# Patient Record
Sex: MALE | Race: WHITE | NOT HISPANIC OR LATINO | Employment: OTHER | URBAN - METROPOLITAN AREA
[De-identification: names, ages, dates, MRNs, and addresses within clinical notes are randomized per-mention and may not be internally consistent; named-entity substitution may affect disease eponyms.]

---

## 2023-05-22 PROBLEM — K27.9 PUD (PEPTIC ULCER DISEASE): Status: ACTIVE | Noted: 2023-05-22

## 2023-06-05 PROBLEM — I25.10 CORONARY ARTERY ARTERIOSCLEROSIS: Status: ACTIVE | Noted: 2023-06-05

## 2023-08-10 DIAGNOSIS — E11.9 TYPE 2 DIABETES MELLITUS WITHOUT COMPLICATION, UNSPECIFIED WHETHER LONG TERM INSULIN USE (MULTI): ICD-10-CM

## 2023-08-10 DIAGNOSIS — I25.10 CORONARY ARTERY ARTERIOSCLEROSIS: ICD-10-CM

## 2023-08-10 RX ORDER — LANCETS 33 GAUGE
EACH MISCELLANEOUS
Qty: 200 EACH | Refills: 3 | Status: SHIPPED | OUTPATIENT
Start: 2023-08-10 | End: 2023-08-11 | Stop reason: SDUPTHER

## 2023-08-11 RX ORDER — BLOOD-GLUCOSE CONTROL, NORMAL
EACH MISCELLANEOUS
Qty: 200 EACH | Refills: 3 | Status: SHIPPED | OUTPATIENT
Start: 2023-08-11

## 2023-08-21 DIAGNOSIS — K27.9 PUD (PEPTIC ULCER DISEASE): ICD-10-CM

## 2023-08-21 RX ORDER — PANTOPRAZOLE SODIUM 40 MG/1
40 TABLET, DELAYED RELEASE ORAL DAILY
Qty: 90 TABLET | Refills: 1 | Status: SHIPPED | OUTPATIENT
Start: 2023-08-21 | End: 2024-02-13

## 2023-08-25 ENCOUNTER — HOSPITAL ENCOUNTER (OUTPATIENT)
Dept: DATA CONVERSION | Facility: HOSPITAL | Age: 88
End: 2023-08-25
Attending: OPHTHALMOLOGY | Admitting: OPHTHALMOLOGY
Payer: MEDICARE

## 2023-08-25 DIAGNOSIS — H02.105 UNSPECIFIED ECTROPION OF LEFT LOWER EYELID: ICD-10-CM

## 2023-08-25 DIAGNOSIS — H02.102 UNSPECIFIED ECTROPION OF RIGHT LOWER EYELID: ICD-10-CM

## 2023-08-25 LAB — POCT GLUCOSE: 91 MG/DL (ref 74–99)

## 2023-09-05 PROBLEM — N40.0 BPH (BENIGN PROSTATIC HYPERPLASIA): Status: ACTIVE | Noted: 2023-09-05

## 2023-09-05 PROBLEM — E03.9 HYPOTHYROID: Status: ACTIVE | Noted: 2023-09-05

## 2023-09-05 PROBLEM — H52.4 BILATERAL PRESBYOPIA: Status: ACTIVE | Noted: 2023-09-05

## 2023-09-05 PROBLEM — I50.9 CONGESTIVE HEART FAILURE (MULTI): Status: ACTIVE | Noted: 2023-09-05

## 2023-09-05 PROBLEM — K92.2 GASTROINTESTINAL BLEED: Status: ACTIVE | Noted: 2023-09-05

## 2023-09-05 PROBLEM — E78.5 DYSLIPIDEMIA: Status: ACTIVE | Noted: 2023-09-05

## 2023-09-05 PROBLEM — E11.9 DIABETES MELLITUS (MULTI): Status: ACTIVE | Noted: 2023-09-05

## 2023-09-05 PROBLEM — H02.132 SENILE ECTROPION OF BOTH LOWER EYELIDS: Status: ACTIVE | Noted: 2023-09-05

## 2023-09-05 PROBLEM — I95.9 HYPOTENSION: Status: ACTIVE | Noted: 2023-09-05

## 2023-09-05 PROBLEM — I10 BENIGN ESSENTIAL HYPERTENSION: Status: ACTIVE | Noted: 2023-09-05

## 2023-09-05 PROBLEM — H02.135 SENILE ECTROPION OF BOTH LOWER EYELIDS: Status: ACTIVE | Noted: 2023-09-05

## 2023-09-05 PROBLEM — E11.40 DIABETIC NEUROPATHY (MULTI): Status: ACTIVE | Noted: 2023-09-05

## 2023-09-05 PROBLEM — M54.50 LOWER BACK PAIN: Status: ACTIVE | Noted: 2023-09-05

## 2023-09-05 PROBLEM — C83.50: Status: ACTIVE | Noted: 2023-09-05

## 2023-09-05 PROBLEM — E55.9 VITAMIN D DEFICIENCY: Status: ACTIVE | Noted: 2023-09-05

## 2023-09-05 PROBLEM — I10 HYPERTENSION: Status: ACTIVE | Noted: 2023-09-05

## 2023-09-05 PROBLEM — E11.9 DIABETES MELLITUS TYPE 2 WITHOUT RETINOPATHY (MULTI): Status: ACTIVE | Noted: 2023-09-05

## 2023-09-05 PROBLEM — N18.31 STAGE 3A CHRONIC KIDNEY DISEASE (MULTI): Status: ACTIVE | Noted: 2023-09-05

## 2023-09-05 PROBLEM — H04.203 BILATERAL EPIPHORA: Status: ACTIVE | Noted: 2023-09-05

## 2023-09-05 PROBLEM — H52.03 HYPEROPIA OF BOTH EYES: Status: ACTIVE | Noted: 2023-09-05

## 2023-09-05 PROBLEM — H02.109 PUNCTAL ECTROPION: Status: ACTIVE | Noted: 2023-09-05

## 2023-09-30 NOTE — H&P
History of Present Illness:   History Present Illness:  Reason for surgery: bilateral lower lid ectropion,  bilateral punctal ectropion   HPI:    Shady Becerril is an 88 y/o M with a history of bilateral lower lid ectropion, bilateral punctal ectropion, who is here for bilateral lower lid ectropion  repair and bilateral lower lid conjunctivoplasty.    Allergies:        Allergies:  ·  No Known Allergies :     Home Medication Review:   Home Medications Reviewed: yes     Impression/Procedure:   ·  Impression and Planned Procedure: bilateral lower lid ectropion, bilateral punctal ectropion; here for bilateral lower lid ectropion repair and bilateral lower lid conjunctivoplasty       ERAS (Enhanced Recovery After Surgery):  ·  ERAS Patient: no       Physical Exam by System:    Constitutional: Well developed, awake/alert/oriented  x3, no distress, alert and cooperative   Eyes: bilateral lower lid ectropion, bilateral punctal  ectropion   Respiratory/Thorax: Non labored respirations   Cardiovascular: Warm, well perfused   Psychological: Appropriate mood and behavior     Consent:   COVID-19 Consent:  ·  COVID-19 Risk Consent Surgeon has reviewed key risks related to the risk of marcin COVID-19 and if they contract COVID-19 what the risks are.     Attestation:   Note Completion:  I am a:  Resident/Fellow   Attending Attestation I saw and evaluated the patient.  I personally obtained the key and critical portions of the history and physical exam or was physically present for key and  critical portions performed by the resident/fellow. I reviewed the resident/fellow?s documentation and discussed the patient with the resident/fellow.  I agree with the resident/fellow?s medical decision making as documented in the note.     I personally evaluated the patient on 25-Aug-2023         Electronic Signatures:  Marcleo Cedillo (Resident))  (Signed 25-Aug-2023 09:40)   Authored: History of Present Illness,  Allergies, Home  Medication Review, Impression/Procedure, ERAS, Physical Exam, Consent, Note Completion  Isaiah Grayson)  (Signed 25-Aug-2023 15:40)   Authored: Note Completion   Co-Signer: History of Present Illness, Allergies, Home Medication Review, Impression/Procedure, ERAS, Physical Exam, Consent, Note Completion      Last Updated: 25-Aug-2023 15:40 by Isaiah Grayson)

## 2023-10-01 NOTE — OP NOTE
Post Operative Note:     PreOp Diagnosis: bilateral lower eyelid ectropion,  bilateral lower eyelid punctal ectropion   Post-Procedure Diagnosis: bilateral lower eyelid  ectropion, bilateral lower eyelid punctal ectropion   Procedure: 1. bilateral lower eyelid ectropion repair  with full thickness excision and repair  2. bilateral lower lid conjunctivoplasty   Surgeon: Isaiah Grayson   Resident/Fellow/Other Assistant: Marcelo Darden   Anesthesia: MAC   Estimated Blood Loss (mL): <2 cc   Specimen: no   Complications: None   Findings: bilateral lower lid ectropion, bilateral  lower lid punctal ectropion     Operative Report Dictated:  Dictation: not applicable - note contains Operative  Report   Operative Report:    The patient was taken to the operating room and placed on the table in the supine position, where anesthesia was induced. 2% lidocaine  with epinephrine and 0.5% marcaine in a 1:1 fashion was injected over the surgical site, and the patient was prepped and draped in the usual manner for orbitofacial surgery.     Corneal protectors were placed in both eyes.     A 15 Bard-Bro blade incision was made at the right  lateral canthus. Sharp dissection was carried down to the lateral orbital rim periosteum. The inferior ramus of the lateral canthal tendon was identified and severed with sharp dissection. A full-thickness en bloc excision of the lateral aspect of the  tarsal plate was carried out with sharp dissection, and bleeding was controlled with electrocauterization. The exact same procedure was performed on the contralateral left  lower lid.     Our attention was then drawn to the medial conjunctivoplasty procedures. A 10mm  long by 4mm ellipse was incised on the right palpebral conjunctiva medially, and Jay scissors were used to excise the conjunctiva  and retractor layer. The lower lid retractors and conjunctiva were then advanced onto the inferior border of tarsus with 5-0  chromic gut suture and the suture was then spiraled inferiorly through  the fornix and exited on the skin near the nasojugal fold. The exact same procedure was performed on the contralateral left  lower lid.     We then returned to the full thickness excision and repair. The cut edge of the right  tarsal plate was advanced to the lateral orbital rim periosteum, where it was sutured with 5-0 vicryl suture to the internal aspect of the lateral orbital rim periosteum. The skin   was closed with 5-0 fast absorbing suture. The exact same procedure was performed on the contralateral  left lower lid.     The corneal protectors were removed and antibiotic ophthalmic ointment was placed over the surgical site.      The patient was then awakened and taken from the operating room in good condition, having tolerated the procedure well. There were no complications,  and the estimated blood loss was less than 2 cc.      Attestation:   Note Completion:  I am a: Resident/Fellow   Attending Attestation I was present for the entire procedure          Electronic Signatures:  Marcelo Cedillo (Resident))  (Signed 25-Aug-2023 15:28)   Authored: Post Operative Note, Note Completion  Isaiah Grayson)  (Signed 25-Aug-2023 15:48)   Authored: Post Operative Note, Note Completion   Co-Signer: Post Operative Note, Note Completion      Last Updated: 25-Aug-2023 15:48 by Isaiah Grayson)

## 2023-10-31 ENCOUNTER — HOSPITAL ENCOUNTER (OUTPATIENT)
Dept: RADIOLOGY | Facility: HOSPITAL | Age: 88
Discharge: HOME | End: 2023-10-31
Payer: MEDICARE

## 2023-10-31 DIAGNOSIS — I27.29 OTHER SECONDARY PULMONARY HYPERTENSION (MULTI): ICD-10-CM

## 2023-10-31 PROCEDURE — 71046 X-RAY EXAM CHEST 2 VIEWS: CPT | Mod: FY

## 2023-10-31 PROCEDURE — 71046 X-RAY EXAM CHEST 2 VIEWS: CPT | Performed by: RADIOLOGY

## 2023-10-31 PROCEDURE — A9540 TC99M MAA: HCPCS

## 2023-10-31 PROCEDURE — 3430000001 HC RX 343 DIAGNOSTIC RADIOPHARMACEUTICALS

## 2023-10-31 PROCEDURE — 78803 RP LOCLZJ TUM SPECT 1 AREA: CPT | Performed by: STUDENT IN AN ORGANIZED HEALTH CARE EDUCATION/TRAINING PROGRAM

## 2023-10-31 PROCEDURE — 78580 LUNG PERFUSION IMAGING: CPT

## 2023-11-15 PROBLEM — Z95.828 PORT-A-CATH IN PLACE: Status: ACTIVE | Noted: 2021-10-27

## 2023-11-15 PROBLEM — C90.00 MULTIPLE MYELOMA (MULTI): Status: ACTIVE | Noted: 2020-04-24

## 2023-11-15 PROBLEM — I48.92 ATRIAL FLUTTER (MULTI): Status: ACTIVE | Noted: 2022-10-17

## 2023-11-15 PROBLEM — D47.2: Status: ACTIVE | Noted: 2020-03-03

## 2023-11-15 PROBLEM — D69.6 PLATELETS DECREASED (CMS-HCC): Status: ACTIVE | Noted: 2023-05-18

## 2023-11-15 PROBLEM — C83.00 MALIGNANT LYMPHOPLASMACYTIC LYMPHOMA (MULTI): Status: ACTIVE | Noted: 2023-11-15

## 2023-11-15 PROBLEM — R01.1 HEART MURMUR: Status: ACTIVE | Noted: 2020-03-23

## 2023-11-15 PROBLEM — E11.9 DIABETES MELLITUS TYPE 2, CONTROLLED, WITHOUT COMPLICATIONS (MULTI): Chronic | Status: ACTIVE | Noted: 2020-03-23

## 2023-11-15 PROBLEM — Z98.890 HISTORY OF CARDIAC CATH: Status: ACTIVE | Noted: 2020-03-23

## 2023-11-15 PROBLEM — C85.99: Status: ACTIVE | Noted: 2021-08-05

## 2023-11-15 PROBLEM — N18.30 CKD (CHRONIC KIDNEY DISEASE) STAGE 3, GFR 30-59 ML/MIN (MULTI): Status: ACTIVE | Noted: 2020-04-24

## 2023-11-15 PROBLEM — I50.33 ACUTE ON CHRONIC DIASTOLIC CONGESTIVE HEART FAILURE (MULTI): Status: ACTIVE | Noted: 2020-03-23

## 2023-11-15 PROBLEM — Z95.1 HX OF CABG: Status: ACTIVE | Noted: 2020-03-23

## 2023-11-15 RX ORDER — FAMOTIDINE 20 MG/1
1 TABLET, FILM COATED ORAL 2 TIMES DAILY
COMMUNITY
Start: 2020-06-22

## 2023-11-15 RX ORDER — LENALIDOMIDE 2.5 MG/1
CAPSULE ORAL
COMMUNITY
Start: 2021-02-03

## 2023-11-15 RX ORDER — ATORVASTATIN CALCIUM 20 MG/1
20 TABLET, FILM COATED ORAL NIGHTLY
COMMUNITY

## 2023-11-15 RX ORDER — ASPIRIN 81 MG/1
1 TABLET ORAL DAILY
COMMUNITY
Start: 2011-02-15

## 2023-11-15 RX ORDER — INSULIN ASPART 100 [IU]/ML
INJECTION, SUSPENSION SUBCUTANEOUS
Status: ON HOLD | COMMUNITY
Start: 2015-01-08 | End: 2024-05-07 | Stop reason: WASHOUT

## 2023-11-15 RX ORDER — FERROUS SULFATE 325(65) MG
325 TABLET ORAL
COMMUNITY
Start: 2020-06-22

## 2023-11-15 RX ORDER — POTASSIUM CHLORIDE 750 MG/1
1 TABLET, FILM COATED, EXTENDED RELEASE ORAL 2 TIMES DAILY
COMMUNITY
Start: 2020-09-18

## 2023-11-15 RX ORDER — NITROGLYCERIN 0.4 MG/1
0.4 TABLET SUBLINGUAL
COMMUNITY
Start: 2020-03-03

## 2023-11-15 RX ORDER — BLOOD-GLUCOSE METER
EACH MISCELLANEOUS
COMMUNITY
Start: 2020-08-31

## 2023-11-15 RX ORDER — ACYCLOVIR 400 MG/1
400 TABLET ORAL DAILY
COMMUNITY

## 2023-11-21 ENCOUNTER — LAB (OUTPATIENT)
Dept: LAB | Facility: LAB | Age: 88
End: 2023-11-21
Payer: MEDICARE

## 2023-11-21 ENCOUNTER — OFFICE VISIT (OUTPATIENT)
Dept: PRIMARY CARE | Facility: CLINIC | Age: 88
End: 2023-11-21
Payer: MEDICARE

## 2023-11-21 VITALS
TEMPERATURE: 97.2 F | HEART RATE: 55 BPM | WEIGHT: 150.69 LBS | DIASTOLIC BLOOD PRESSURE: 62 MMHG | OXYGEN SATURATION: 100 % | SYSTOLIC BLOOD PRESSURE: 112 MMHG | HEIGHT: 63 IN | BODY MASS INDEX: 26.7 KG/M2

## 2023-11-21 DIAGNOSIS — C83.00 LYMPHOPLASMACYTIC LYMPHOMA (MULTI): ICD-10-CM

## 2023-11-21 DIAGNOSIS — E78.00 HYPERCHOLESTEREMIA: ICD-10-CM

## 2023-11-21 DIAGNOSIS — I48.0 PAF (PAROXYSMAL ATRIAL FIBRILLATION) (MULTI): ICD-10-CM

## 2023-11-21 DIAGNOSIS — J90 PLEURAL EFFUSION: Primary | ICD-10-CM

## 2023-11-21 DIAGNOSIS — Z13.89 SCREENING FOR MULTIPLE CONDITIONS: ICD-10-CM

## 2023-11-21 DIAGNOSIS — J42 CHRONIC BRONCHITIS, UNSPECIFIED CHRONIC BRONCHITIS TYPE (MULTI): ICD-10-CM

## 2023-11-21 DIAGNOSIS — Z00.00 ROUTINE GENERAL MEDICAL EXAMINATION AT HEALTH CARE FACILITY: ICD-10-CM

## 2023-11-21 DIAGNOSIS — E11.69 TYPE 2 DIABETES MELLITUS WITH OTHER SPECIFIED COMPLICATION, WITHOUT LONG-TERM CURRENT USE OF INSULIN (MULTI): ICD-10-CM

## 2023-11-21 DIAGNOSIS — I50.9 CONGESTIVE HEART FAILURE, UNSPECIFIED HF CHRONICITY, UNSPECIFIED HEART FAILURE TYPE (MULTI): ICD-10-CM

## 2023-11-21 DIAGNOSIS — J90 PLEURAL EFFUSION: ICD-10-CM

## 2023-11-21 PROBLEM — C88.00 WALDENSTROM MACROGLOBULINEMIA: Status: RESOLVED | Noted: 2020-03-03 | Resolved: 2023-11-21

## 2023-11-21 PROBLEM — H52.4 BILATERAL PRESBYOPIA: Status: RESOLVED | Noted: 2023-09-05 | Resolved: 2023-11-21

## 2023-11-21 PROBLEM — C88.0 WALDENSTROM MACROGLOBULINEMIA (MULTI): Status: ACTIVE | Noted: 2020-03-03

## 2023-11-21 PROBLEM — C88.00 WALDENSTROM MACROGLOBULINEMIA: Status: ACTIVE | Noted: 2020-03-03

## 2023-11-21 PROBLEM — C88.0 WALDENSTROM MACROGLOBULINEMIA (MULTI): Status: RESOLVED | Noted: 2020-03-03 | Resolved: 2023-11-21

## 2023-11-21 LAB
BNP SERPL-MCNC: 416 PG/ML (ref 0–99)
CHOLEST SERPL-MCNC: 62 MG/DL (ref 0–199)
CHOLESTEROL/HDL RATIO: 2
HDLC SERPL-MCNC: 31.1 MG/DL
LDLC SERPL CALC-MCNC: 20 MG/DL
NON HDL CHOLESTEROL: 31 MG/DL (ref 0–149)
TRIGL SERPL-MCNC: 54 MG/DL (ref 0–149)
VLDL: 11 MG/DL (ref 0–40)

## 2023-11-21 PROCEDURE — 83036 HEMOGLOBIN GLYCOSYLATED A1C: CPT

## 2023-11-21 PROCEDURE — 1160F RVW MEDS BY RX/DR IN RCRD: CPT | Performed by: INTERNAL MEDICINE

## 2023-11-21 PROCEDURE — 36415 COLL VENOUS BLD VENIPUNCTURE: CPT

## 2023-11-21 PROCEDURE — G0439 PPPS, SUBSEQ VISIT: HCPCS | Performed by: INTERNAL MEDICINE

## 2023-11-21 PROCEDURE — 3078F DIAST BP <80 MM HG: CPT | Performed by: INTERNAL MEDICINE

## 2023-11-21 PROCEDURE — 1170F FXNL STATUS ASSESSED: CPT | Performed by: INTERNAL MEDICINE

## 2023-11-21 PROCEDURE — 3074F SYST BP LT 130 MM HG: CPT | Performed by: INTERNAL MEDICINE

## 2023-11-21 PROCEDURE — 1036F TOBACCO NON-USER: CPT | Performed by: INTERNAL MEDICINE

## 2023-11-21 PROCEDURE — 1159F MED LIST DOCD IN RCRD: CPT | Performed by: INTERNAL MEDICINE

## 2023-11-21 RX ORDER — AMLODIPINE BESYLATE 5 MG/1
5 TABLET ORAL DAILY
COMMUNITY

## 2023-11-21 RX ORDER — MUPIROCIN 20 MG/G
OINTMENT TOPICAL
COMMUNITY

## 2023-11-21 ASSESSMENT — ENCOUNTER SYMPTOMS
SORE THROAT: 0
PALPITATIONS: 0
HEADACHES: 0
BRUISES/BLEEDS EASILY: 0
UNEXPECTED WEIGHT CHANGE: 1
FATIGUE: 1
COUGH: 0
FEVER: 0
DIZZINESS: 0
DEPRESSION: 0
DIARRHEA: 0
WHEEZING: 0
SINUS PAIN: 0
OCCASIONAL FEELINGS OF UNSTEADINESS: 0
ABDOMINAL PAIN: 0
ARTHRALGIAS: 0
BLOOD IN STOOL: 0
LOSS OF SENSATION IN FEET: 0
DIFFICULTY URINATING: 0
SHORTNESS OF BREATH: 1

## 2023-11-21 ASSESSMENT — ACTIVITIES OF DAILY LIVING (ADL)
GROCERY_SHOPPING: INDEPENDENT
BATHING: INDEPENDENT
TAKING_MEDICATION: INDEPENDENT
DOING_HOUSEWORK: INDEPENDENT
MANAGING_FINANCES: INDEPENDENT
DRESSING: INDEPENDENT

## 2023-11-21 ASSESSMENT — PATIENT HEALTH QUESTIONNAIRE - PHQ9
1. LITTLE INTEREST OR PLEASURE IN DOING THINGS: NOT AT ALL
2. FEELING DOWN, DEPRESSED OR HOPELESS: NOT AT ALL
SUM OF ALL RESPONSES TO PHQ9 QUESTIONS 1 AND 2: 0

## 2023-11-21 NOTE — PROGRESS NOTES
Subjective   Reason for Visit: Shady Becerril is an 89 y.o. male here for a Medicare Wellness visit.     Past Medical, Surgical, and Family History reviewed and updated in chart.    Reviewed all medications by prescribing practitioner or clinical pharmacist (such as prescriptions, OTCs, herbal therapies and supplements) and documented in the medical record.    Annual preventive visit  - Vaccinations reviewed flu vaccine up-to-date patient declined RSV Tdap due to being immunocompromise we will follow-up closely as needed  -Screening for colon cancer obtained no need for further repeat  - Screening for depression negative  - Medical screening reviewed    Follow-up  -Coronary artery disease status post bypass surgery recently diagnosed with paroxysmal atrial fibrillation not candidate for anticoagulation due to previous GI bleed, not candidate for Watchman procedure patient opted for conservative measures we will continue monitoring closely  -Recent hematology oncology records reviewed cardiology records reviewed discussed with patient findings and plan of care  -Shortness of breath on exertion  Recent chest x-ray VQ scan reviewed left pleural effusion, stat BMP today 460 elevated patient counseled about resuming Demadex daily for 1 week then to take it at least twice a week follow-up results closely  -Asthenia weakness patient lipid profile controlled may take Crestor only twice a week at this point patient agreed  - Diabetes controlled follow-up hemoglobin A1c  -Lymphoplasmacytic lymphoma, 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  -Anemia:, Stable hematology may consider erythropoietin in the future if hemoglobin less than 10  -Renal sufficiency improved  - MGUS - follow SPEP and lambda/kappa free light chains over time  -  IV access:  Mediport draws.  Continue bimonthly mediport draws in [place] prior to each bimonthly visit with me.  - R ventricular HTN: Consider sleep apnea  "and arrange for sleep studies, if no improvement, need to continue  Demadex as recommended  Follow-up as needed follow-up in 6 months        Patient Care Team:  Juliette Zazueta MD as PCP - General     Review of Systems   Constitutional:  Positive for fatigue and unexpected weight change. Negative for fever.   HENT:  Negative for congestion, ear discharge, ear pain, mouth sores, sinus pain and sore throat.    Eyes:  Negative for visual disturbance.   Respiratory:  Positive for shortness of breath. Negative for cough and wheezing.    Cardiovascular:  Negative for chest pain, palpitations and leg swelling.   Gastrointestinal:  Negative for abdominal pain, blood in stool and diarrhea.   Genitourinary:  Negative for difficulty urinating.   Musculoskeletal:  Negative for arthralgias.   Skin:  Negative for rash.   Neurological:  Negative for dizziness and headaches.   Hematological:  Does not bruise/bleed easily.   Psychiatric/Behavioral:  Negative for behavioral problems.    All other systems reviewed and are negative.      Objective   Vitals:  /62   Pulse 55   Temp 36.2 °C (97.2 °F)   Ht 1.588 m (5' 2.5\")   Wt 68.4 kg (150 lb 11 oz)   SpO2 100%   BMI 27.12 kg/m²       Physical Exam  Vitals and nursing note reviewed.   Constitutional:       Appearance: Normal appearance.   HENT:      Head: Normocephalic.      Nose: Nose normal.   Eyes:      Conjunctiva/sclera: Conjunctivae normal.      Pupils: Pupils are equal, round, and reactive to light.   Cardiovascular:      Rate and Rhythm: Regular rhythm.   Pulmonary:      Effort: Pulmonary effort is normal.      Breath sounds: Normal breath sounds.   Abdominal:      General: Abdomen is flat.      Palpations: Abdomen is soft.   Musculoskeletal:      Cervical back: Neck supple.   Skin:     General: Skin is warm.   Neurological:      General: No focal deficit present.      Mental Status: He is oriented to person, place, and time.   Psychiatric:         Mood and Affect: " Mood normal.         Assessment/Plan   Problem List Items Addressed This Visit       Congestive heart failure (CMS/HCC)    Relevant Medications    nitroglycerin (Nitrostat) 0.4 mg SL tablet    amLODIPine (Norvasc) 5 mg tablet    Other Relevant Orders    B-type natriuretic peptide (Completed)    Diabetes mellitus (CMS/HCC)    Relevant Orders    Hemoglobin A1C    Lymphoplasmacytic lymphoma (CMS/HCC)    Chronic bronchitis, unspecified chronic bronchitis type (CMS/HCC)    Pleural effusion - Primary    Relevant Orders    B-type natriuretic peptide (Completed)    Hypercholesteremia    Relevant Orders    Lipid Panel (Completed)    PAF (paroxysmal atrial fibrillation) (CMS/HCC)    Relevant Medications    nitroglycerin (Nitrostat) 0.4 mg SL tablet    amLODIPine (Norvasc) 5 mg tablet    Other Relevant Orders    B-type natriuretic peptide (Completed)     Other Visit Diagnoses       Screening for multiple conditions        Routine general medical examination at health care facility              Annual preventive visit  - Vaccinations reviewed flu vaccine up-to-date patient declined RSV Tdap due to being immunocompromise we will follow-up closely as needed  -Screening for colon cancer obtained no need for further repeat  - Screening for depression negative  - Medical screening reviewed    Follow-up  -Coronary artery disease status post bypass surgery recently diagnosed with paroxysmal atrial fibrillation not candidate for anticoagulation due to previous GI bleed, not candidate for Watchman procedure patient opted for conservative measures we will continue monitoring closely  -Recent hematology oncology records reviewed cardiology records reviewed discussed with patient findings and plan of care  -Shortness of breath on exertion  Recent chest x-ray VQ scan reviewed left pleural effusion, stat BMP today 460 elevated patient counseled about resuming Demadex daily for 1 week then to take it at least twice a week follow-up results  closely  -Asthenia weakness patient lipid profile controlled may take Crestor only twice a week at this point patient agreed  - Diabetes controlled follow-up hemoglobin A1c  -Lymphoplasmacytic lymphoma, 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  -Anemia:, Stable hematology may consider erythropoietin in the future if hemoglobin less than 10  -Renal sufficiency improved  - MGUS - follow SPEP and lambda/kappa free light chains over time  -  IV access:  Mediport draws.  Continue bimonthly mediport draws in [place] prior to each bimonthly visit with me.  - R ventricular HTN: Consider sleep apnea and arrange for sleep studies, if no improvement, need to continue  Demadex as recommended  Follow-up as needed follow-up in 6 months

## 2023-11-21 NOTE — PROGRESS NOTES
"Subjective   Patient ID: Shady Becerril is a 89 y.o. male who presents for Medicare Annual Wellness Visit Subsequent.    HPI       Review of Systems    Objective   No results found for: \"HGBA1C\"   /62   Pulse 55   Temp 36.2 °C (97.2 °F)   Ht 1.588 m (5' 2.5\")   Wt 68.4 kg (150 lb 11 oz)   SpO2 100%   BMI 27.12 kg/m²   Lab Results   Component Value Date    WBC 4.1 (L) 09/13/2021    HGB 11.0 (L) 09/13/2021    HCT 33.6 (L) 09/13/2021     09/13/2021    CHOL 101 09/13/2021    TRIG 115 09/13/2021    HDL 35.0 (A) 09/13/2021    ALT 11 09/13/2021    AST 17 09/13/2021     09/13/2021    K 4.0 09/13/2021     09/13/2021    CREATININE 1.46 (H) 09/13/2021    BUN 16 09/13/2021    CO2 25 09/13/2021    TSH 3.15 09/13/2021    INR 1.2 (H) 09/07/2020     par   Physical Exam    Assessment/Plan   There are no diagnoses linked to this encounter.   "

## 2023-11-22 LAB
EST. AVERAGE GLUCOSE BLD GHB EST-MCNC: 126 MG/DL
HBA1C MFR BLD: 6 %

## 2024-02-08 DIAGNOSIS — E03.9 HYPOTHYROIDISM, UNSPECIFIED TYPE: ICD-10-CM

## 2024-02-08 RX ORDER — LEVOTHYROXINE SODIUM 25 UG/1
25 TABLET ORAL DAILY
Qty: 90 TABLET | Refills: 1 | Status: SHIPPED | OUTPATIENT
Start: 2024-02-08

## 2024-02-13 DIAGNOSIS — K27.9 PUD (PEPTIC ULCER DISEASE): ICD-10-CM

## 2024-02-13 RX ORDER — PANTOPRAZOLE SODIUM 40 MG/1
40 TABLET, DELAYED RELEASE ORAL DAILY
Qty: 90 TABLET | Refills: 1 | Status: SHIPPED | OUTPATIENT
Start: 2024-02-13

## 2024-02-27 DIAGNOSIS — I25.10 CORONARY ARTERY ARTERIOSCLEROSIS: ICD-10-CM

## 2024-02-27 RX ORDER — CARVEDILOL 25 MG/1
TABLET ORAL
Qty: 180 TABLET | Refills: 1 | Status: SHIPPED | OUTPATIENT
Start: 2024-02-27

## 2024-04-02 ENCOUNTER — APPOINTMENT (OUTPATIENT)
Dept: OPHTHALMOLOGY | Facility: CLINIC | Age: 89
End: 2024-04-02
Payer: MEDICARE

## 2024-04-02 NOTE — PROGRESS NOTES
Assessment/Plan   Diagnoses and all orders for this visit:  Epiphora, unspecified laterality      New patient presenting for excessive lacrimation. Hx of NLDO with right dacryocystorhinostomy (DCR) and left dacryocystorhinostomy (DCR) status post (s/p) removal (Dr. Power CCBASIA)

## 2024-05-07 ENCOUNTER — APPOINTMENT (OUTPATIENT)
Dept: CARDIOLOGY | Facility: HOSPITAL | Age: 89
DRG: 193 | End: 2024-05-07
Payer: MEDICARE

## 2024-05-07 ENCOUNTER — HOSPITAL ENCOUNTER (INPATIENT)
Facility: HOSPITAL | Age: 89
LOS: 2 days | Discharge: HOME | DRG: 193 | End: 2024-05-10
Attending: STUDENT IN AN ORGANIZED HEALTH CARE EDUCATION/TRAINING PROGRAM | Admitting: INTERNAL MEDICINE
Payer: MEDICARE

## 2024-05-07 ENCOUNTER — APPOINTMENT (OUTPATIENT)
Dept: RADIOLOGY | Facility: HOSPITAL | Age: 89
DRG: 193 | End: 2024-05-07
Payer: MEDICARE

## 2024-05-07 ENCOUNTER — HOSPITAL ENCOUNTER (INPATIENT)
Age: 89
End: 2024-05-07
Attending: INTERNAL MEDICINE | Admitting: INTERNAL MEDICINE
Payer: MEDICARE

## 2024-05-07 DIAGNOSIS — I50.33 ACUTE ON CHRONIC DIASTOLIC CONGESTIVE HEART FAILURE (MULTI): ICD-10-CM

## 2024-05-07 DIAGNOSIS — R06.02 SHORTNESS OF BREATH: ICD-10-CM

## 2024-05-07 DIAGNOSIS — J18.9 PNEUMONIA DUE TO INFECTIOUS ORGANISM, UNSPECIFIED LATERALITY, UNSPECIFIED PART OF LUNG: ICD-10-CM

## 2024-05-07 DIAGNOSIS — J18.9 PNEUMONIA, UNSPECIFIED ORGANISM: Primary | ICD-10-CM

## 2024-05-07 LAB
ALBUMIN SERPL BCP-MCNC: 3.3 G/DL (ref 3.4–5)
ALP SERPL-CCNC: 101 U/L (ref 33–136)
ALT SERPL W P-5'-P-CCNC: 11 U/L (ref 10–52)
ANION GAP SERPL CALC-SCNC: 13 MMOL/L (ref 10–20)
AORTIC VALVE MEAN GRADIENT: 18 MMHG
AORTIC VALVE PEAK VELOCITY: 2.79 M/S
AST SERPL W P-5'-P-CCNC: 23 U/L (ref 9–39)
AV PEAK GRADIENT: 31.1 MMHG
AVA (PEAK VEL): 1.27 CM2
AVA (VTI): 1.34 CM2
BASOPHILS # BLD AUTO: 0.03 X10*3/UL (ref 0–0.1)
BASOPHILS NFR BLD AUTO: 0.8 %
BILIRUB SERPL-MCNC: 1 MG/DL (ref 0–1.2)
BNP SERPL-MCNC: 491 PG/ML (ref 0–99)
BUN SERPL-MCNC: 30 MG/DL (ref 6–23)
CALCIUM SERPL-MCNC: 8.7 MG/DL (ref 8.6–10.3)
CARDIAC TROPONIN I PNL SERPL HS: 20 NG/L (ref 0–20)
CHLORIDE SERPL-SCNC: 105 MMOL/L (ref 98–107)
CO2 SERPL-SCNC: 24 MMOL/L (ref 21–32)
CREAT SERPL-MCNC: 1.64 MG/DL (ref 0.5–1.3)
EGFRCR SERPLBLD CKD-EPI 2021: 39 ML/MIN/1.73M*2
EJECTION FRACTION APICAL 4 CHAMBER: 65
EOSINOPHIL # BLD AUTO: 0.35 X10*3/UL (ref 0–0.4)
EOSINOPHIL NFR BLD AUTO: 9 %
ERYTHROCYTE [DISTWIDTH] IN BLOOD BY AUTOMATED COUNT: 14.3 % (ref 11.5–14.5)
FLUAV RNA RESP QL NAA+PROBE: NOT DETECTED
FLUBV RNA RESP QL NAA+PROBE: NOT DETECTED
GLUCOSE BLD MANUAL STRIP-MCNC: 187 MG/DL (ref 74–99)
GLUCOSE SERPL-MCNC: 69 MG/DL (ref 74–99)
HCT VFR BLD AUTO: 34.6 % (ref 41–52)
HGB BLD-MCNC: 11.4 G/DL (ref 13.5–17.5)
IMM GRANULOCYTES # BLD AUTO: 0.03 X10*3/UL (ref 0–0.5)
IMM GRANULOCYTES NFR BLD AUTO: 0.8 % (ref 0–0.9)
LEFT ATRIUM VOLUME AREA LENGTH INDEX BSA: 31.2 ML/M2
LEFT VENTRICLE INTERNAL DIMENSION DIASTOLE: 3.3 CM (ref 3.5–6)
LEFT VENTRICULAR OUTFLOW TRACT DIAMETER: 1.8 CM
LV EJECTION FRACTION BIPLANE: 66 %
LYMPHOCYTES # BLD AUTO: 0.99 X10*3/UL (ref 0.8–3)
LYMPHOCYTES NFR BLD AUTO: 25.3 %
MCH RBC QN AUTO: 32.2 PG (ref 26–34)
MCHC RBC AUTO-ENTMCNC: 32.9 G/DL (ref 32–36)
MCV RBC AUTO: 98 FL (ref 80–100)
MITRAL VALVE E/A RATIO: 2.69
MITRAL VALVE E/E' RATIO: 24.96
MONOCYTES # BLD AUTO: 0.27 X10*3/UL (ref 0.05–0.8)
MONOCYTES NFR BLD AUTO: 6.9 %
NEUTROPHILS # BLD AUTO: 2.24 X10*3/UL (ref 1.6–5.5)
NEUTROPHILS NFR BLD AUTO: 57.2 %
NRBC BLD-RTO: 0 /100 WBCS (ref 0–0)
PLATELET # BLD AUTO: 128 X10*3/UL (ref 150–450)
POTASSIUM SERPL-SCNC: 5.3 MMOL/L (ref 3.5–5.3)
PROT SERPL-MCNC: 7.8 G/DL (ref 6.4–8.2)
RBC # BLD AUTO: 3.54 X10*6/UL (ref 4.5–5.9)
RBC MORPH BLD: NORMAL
RIGHT VENTRICLE FREE WALL PEAK S': 8.27 CM/S
RIGHT VENTRICLE PEAK SYSTOLIC PRESSURE: 71.6 MMHG
SARS-COV-2 RNA RESP QL NAA+PROBE: NOT DETECTED
SODIUM SERPL-SCNC: 137 MMOL/L (ref 136–145)
TRICUSPID ANNULAR PLANE SYSTOLIC EXCURSION: 1.2 CM
WBC # BLD AUTO: 3.9 X10*3/UL (ref 4.4–11.3)

## 2024-05-07 PROCEDURE — 96375 TX/PRO/DX INJ NEW DRUG ADDON: CPT

## 2024-05-07 PROCEDURE — 71250 CT THORAX DX C-: CPT

## 2024-05-07 PROCEDURE — G0378 HOSPITAL OBSERVATION PER HR: HCPCS

## 2024-05-07 PROCEDURE — 2500000004 HC RX 250 GENERAL PHARMACY W/ HCPCS (ALT 636 FOR OP/ED): Performed by: STUDENT IN AN ORGANIZED HEALTH CARE EDUCATION/TRAINING PROGRAM

## 2024-05-07 PROCEDURE — 2500000001 HC RX 250 WO HCPCS SELF ADMINISTERED DRUGS (ALT 637 FOR MEDICARE OP)

## 2024-05-07 PROCEDURE — 99285 EMERGENCY DEPT VISIT HI MDM: CPT | Mod: 25

## 2024-05-07 PROCEDURE — 71045 X-RAY EXAM CHEST 1 VIEW: CPT

## 2024-05-07 PROCEDURE — 94664 DEMO&/EVAL PT USE INHALER: CPT

## 2024-05-07 PROCEDURE — 94760 N-INVAS EAR/PLS OXIMETRY 1: CPT

## 2024-05-07 PROCEDURE — 2500000001 HC RX 250 WO HCPCS SELF ADMINISTERED DRUGS (ALT 637 FOR MEDICARE OP): Performed by: STUDENT IN AN ORGANIZED HEALTH CARE EDUCATION/TRAINING PROGRAM

## 2024-05-07 PROCEDURE — 71045 X-RAY EXAM CHEST 1 VIEW: CPT | Performed by: RADIOLOGY

## 2024-05-07 PROCEDURE — 37799 UNLISTED PX VASCULAR SURGERY: CPT

## 2024-05-07 PROCEDURE — 2500000004 HC RX 250 GENERAL PHARMACY W/ HCPCS (ALT 636 FOR OP/ED)

## 2024-05-07 PROCEDURE — 2500000002 HC RX 250 W HCPCS SELF ADMINISTERED DRUGS (ALT 637 FOR MEDICARE OP, ALT 636 FOR OP/ED)

## 2024-05-07 PROCEDURE — 84145 PROCALCITONIN (PCT): CPT | Mod: GENLAB

## 2024-05-07 PROCEDURE — 94640 AIRWAY INHALATION TREATMENT: CPT

## 2024-05-07 PROCEDURE — 2500000002 HC RX 250 W HCPCS SELF ADMINISTERED DRUGS (ALT 637 FOR MEDICARE OP, ALT 636 FOR OP/ED): Mod: MUE

## 2024-05-07 PROCEDURE — 2500000005 HC RX 250 GENERAL PHARMACY W/O HCPCS: Performed by: STUDENT IN AN ORGANIZED HEALTH CARE EDUCATION/TRAINING PROGRAM

## 2024-05-07 PROCEDURE — 83880 ASSAY OF NATRIURETIC PEPTIDE: CPT | Performed by: STUDENT IN AN ORGANIZED HEALTH CARE EDUCATION/TRAINING PROGRAM

## 2024-05-07 PROCEDURE — 96365 THER/PROPH/DIAG IV INF INIT: CPT | Mod: 59

## 2024-05-07 PROCEDURE — 94667 MNPJ CHEST WALL 1ST: CPT

## 2024-05-07 PROCEDURE — 94668 MNPJ CHEST WALL SBSQ: CPT

## 2024-05-07 PROCEDURE — 93306 TTE W/DOPPLER COMPLETE: CPT

## 2024-05-07 PROCEDURE — 96376 TX/PRO/DX INJ SAME DRUG ADON: CPT

## 2024-05-07 PROCEDURE — 82947 ASSAY GLUCOSE BLOOD QUANT: CPT

## 2024-05-07 PROCEDURE — 80053 COMPREHEN METABOLIC PANEL: CPT | Performed by: STUDENT IN AN ORGANIZED HEALTH CARE EDUCATION/TRAINING PROGRAM

## 2024-05-07 PROCEDURE — 71250 CT THORAX DX C-: CPT | Performed by: RADIOLOGY

## 2024-05-07 PROCEDURE — 93005 ELECTROCARDIOGRAM TRACING: CPT

## 2024-05-07 PROCEDURE — 2500000002 HC RX 250 W HCPCS SELF ADMINISTERED DRUGS (ALT 637 FOR MEDICARE OP, ALT 636 FOR OP/ED): Performed by: STUDENT IN AN ORGANIZED HEALTH CARE EDUCATION/TRAINING PROGRAM

## 2024-05-07 PROCEDURE — 84484 ASSAY OF TROPONIN QUANT: CPT | Performed by: STUDENT IN AN ORGANIZED HEALTH CARE EDUCATION/TRAINING PROGRAM

## 2024-05-07 PROCEDURE — 9420000001 HC RT PATIENT EDUCATION 5 MIN

## 2024-05-07 PROCEDURE — 87636 SARSCOV2 & INF A&B AMP PRB: CPT | Performed by: STUDENT IN AN ORGANIZED HEALTH CARE EDUCATION/TRAINING PROGRAM

## 2024-05-07 PROCEDURE — 96367 TX/PROPH/DG ADDL SEQ IV INF: CPT

## 2024-05-07 PROCEDURE — 36415 COLL VENOUS BLD VENIPUNCTURE: CPT | Performed by: STUDENT IN AN ORGANIZED HEALTH CARE EDUCATION/TRAINING PROGRAM

## 2024-05-07 PROCEDURE — 85025 COMPLETE CBC W/AUTO DIFF WBC: CPT | Performed by: STUDENT IN AN ORGANIZED HEALTH CARE EDUCATION/TRAINING PROGRAM

## 2024-05-07 RX ORDER — DEXTROSE 50 % IN WATER (D50W) INTRAVENOUS SYRINGE
25
Status: DISCONTINUED | OUTPATIENT
Start: 2024-05-07 | End: 2024-05-10 | Stop reason: HOSPADM

## 2024-05-07 RX ORDER — ACETAMINOPHEN 325 MG/1
650 TABLET ORAL EVERY 4 HOURS PRN
Status: DISCONTINUED | OUTPATIENT
Start: 2024-05-07 | End: 2024-05-10 | Stop reason: HOSPADM

## 2024-05-07 RX ORDER — LEVOTHYROXINE SODIUM 25 UG/1
25 TABLET ORAL
Status: DISCONTINUED | OUTPATIENT
Start: 2024-05-07 | End: 2024-05-08

## 2024-05-07 RX ORDER — AMLODIPINE BESYLATE 5 MG/1
5 TABLET ORAL DAILY
Status: DISCONTINUED | OUTPATIENT
Start: 2024-05-07 | End: 2024-05-07

## 2024-05-07 RX ORDER — PANTOPRAZOLE SODIUM 40 MG/10ML
40 INJECTION, POWDER, LYOPHILIZED, FOR SOLUTION INTRAVENOUS
Status: DISCONTINUED | OUTPATIENT
Start: 2024-05-08 | End: 2024-05-08

## 2024-05-07 RX ORDER — ALBUTEROL SULFATE 0.83 MG/ML
2.5 SOLUTION RESPIRATORY (INHALATION) EVERY 2 HOUR PRN
Status: DISCONTINUED | OUTPATIENT
Start: 2024-05-07 | End: 2024-05-10 | Stop reason: HOSPADM

## 2024-05-07 RX ORDER — DEXTROSE 50 % IN WATER (D50W) INTRAVENOUS SYRINGE
12.5
Status: DISCONTINUED | OUTPATIENT
Start: 2024-05-07 | End: 2024-05-10 | Stop reason: HOSPADM

## 2024-05-07 RX ORDER — ACETAMINOPHEN 650 MG/1
650 SUPPOSITORY RECTAL EVERY 4 HOURS PRN
Status: DISCONTINUED | OUTPATIENT
Start: 2024-05-07 | End: 2024-05-08

## 2024-05-07 RX ORDER — PANTOPRAZOLE SODIUM 40 MG/1
40 TABLET, DELAYED RELEASE ORAL
Status: DISCONTINUED | OUTPATIENT
Start: 2024-05-08 | End: 2024-05-08

## 2024-05-07 RX ORDER — ENOXAPARIN SODIUM 100 MG/ML
30 INJECTION SUBCUTANEOUS EVERY 24 HOURS
Status: DISCONTINUED | OUTPATIENT
Start: 2024-05-07 | End: 2024-05-10 | Stop reason: HOSPADM

## 2024-05-07 RX ORDER — NITROGLYCERIN 0.4 MG/1
0.4 TABLET SUBLINGUAL ONCE
Status: DISCONTINUED | OUTPATIENT
Start: 2024-05-07 | End: 2024-05-07 | Stop reason: SDUPTHER

## 2024-05-07 RX ORDER — CEFTRIAXONE 1 G/50ML
1 INJECTION, SOLUTION INTRAVENOUS EVERY 24 HOURS
Status: DISCONTINUED | OUTPATIENT
Start: 2024-05-08 | End: 2024-05-10 | Stop reason: HOSPADM

## 2024-05-07 RX ORDER — AMLODIPINE BESYLATE 5 MG/1
5 TABLET ORAL DAILY
Status: DISCONTINUED | OUTPATIENT
Start: 2024-05-07 | End: 2024-05-10 | Stop reason: HOSPADM

## 2024-05-07 RX ORDER — GUAIFENESIN 600 MG/1
600 TABLET, EXTENDED RELEASE ORAL EVERY 12 HOURS PRN
Status: DISCONTINUED | OUTPATIENT
Start: 2024-05-07 | End: 2024-05-10 | Stop reason: HOSPADM

## 2024-05-07 RX ORDER — FERROUS SULFATE 325(65) MG
1 TABLET ORAL
Status: DISCONTINUED | OUTPATIENT
Start: 2024-05-08 | End: 2024-05-10 | Stop reason: HOSPADM

## 2024-05-07 RX ORDER — ONDANSETRON HYDROCHLORIDE 2 MG/ML
4 INJECTION, SOLUTION INTRAVENOUS EVERY 8 HOURS PRN
Status: DISCONTINUED | OUTPATIENT
Start: 2024-05-07 | End: 2024-05-10 | Stop reason: HOSPADM

## 2024-05-07 RX ORDER — BUMETANIDE 0.25 MG/ML
INJECTION INTRAMUSCULAR; INTRAVENOUS
Status: COMPLETED
Start: 2024-05-07 | End: 2024-05-07

## 2024-05-07 RX ORDER — POTASSIUM CHLORIDE 750 MG/1
10 TABLET, FILM COATED, EXTENDED RELEASE ORAL 2 TIMES DAILY
Status: DISCONTINUED | OUTPATIENT
Start: 2024-05-07 | End: 2024-05-10 | Stop reason: HOSPADM

## 2024-05-07 RX ORDER — BUMETANIDE 0.25 MG/ML
1 INJECTION INTRAMUSCULAR; INTRAVENOUS ONCE
Status: COMPLETED | OUTPATIENT
Start: 2024-05-07 | End: 2024-05-07

## 2024-05-07 RX ORDER — CEFTRIAXONE 1 G/50ML
1 INJECTION, SOLUTION INTRAVENOUS ONCE
Status: COMPLETED | OUTPATIENT
Start: 2024-05-07 | End: 2024-05-07

## 2024-05-07 RX ORDER — IPRATROPIUM BROMIDE AND ALBUTEROL SULFATE 2.5; .5 MG/3ML; MG/3ML
3 SOLUTION RESPIRATORY (INHALATION)
Status: DISCONTINUED | OUTPATIENT
Start: 2024-05-07 | End: 2024-05-08

## 2024-05-07 RX ORDER — ASPIRIN 81 MG/1
81 TABLET ORAL DAILY
Status: DISCONTINUED | OUTPATIENT
Start: 2024-05-07 | End: 2024-05-10 | Stop reason: HOSPADM

## 2024-05-07 RX ORDER — FUROSEMIDE 10 MG/ML
20 INJECTION INTRAMUSCULAR; INTRAVENOUS 2 TIMES DAILY
Status: DISCONTINUED | OUTPATIENT
Start: 2024-05-07 | End: 2024-05-10 | Stop reason: HOSPADM

## 2024-05-07 RX ORDER — FUROSEMIDE 10 MG/ML
INJECTION INTRAMUSCULAR; INTRAVENOUS
Status: DISCONTINUED
Start: 2024-05-07 | End: 2024-05-07 | Stop reason: WASHOUT

## 2024-05-07 RX ORDER — CARVEDILOL 25 MG/1
25 TABLET ORAL
Status: DISCONTINUED | OUTPATIENT
Start: 2024-05-07 | End: 2024-05-10 | Stop reason: HOSPADM

## 2024-05-07 RX ORDER — ACETAMINOPHEN 160 MG/5ML
650 SOLUTION ORAL EVERY 4 HOURS PRN
Status: DISCONTINUED | OUTPATIENT
Start: 2024-05-07 | End: 2024-05-08

## 2024-05-07 RX ORDER — TALC
6 POWDER (GRAM) TOPICAL NIGHTLY PRN
Status: DISCONTINUED | OUTPATIENT
Start: 2024-05-07 | End: 2024-05-10 | Stop reason: HOSPADM

## 2024-05-07 RX ORDER — NITROGLYCERIN 0.4 MG/1
0.4 TABLET SUBLINGUAL ONCE
Status: COMPLETED | OUTPATIENT
Start: 2024-05-07 | End: 2024-05-07

## 2024-05-07 RX ORDER — IPRATROPIUM BROMIDE AND ALBUTEROL SULFATE 2.5; .5 MG/3ML; MG/3ML
3 SOLUTION RESPIRATORY (INHALATION) ONCE
Status: COMPLETED | OUTPATIENT
Start: 2024-05-07 | End: 2024-05-07

## 2024-05-07 RX ORDER — ATORVASTATIN CALCIUM 10 MG/1
20 TABLET, FILM COATED ORAL NIGHTLY
Status: DISCONTINUED | OUTPATIENT
Start: 2024-05-07 | End: 2024-05-10 | Stop reason: HOSPADM

## 2024-05-07 RX ORDER — AZITHROMYCIN 250 MG/1
500 TABLET, FILM COATED ORAL DAILY
Status: DISCONTINUED | OUTPATIENT
Start: 2024-05-08 | End: 2024-05-10 | Stop reason: HOSPADM

## 2024-05-07 RX ORDER — NITROGLYCERIN 0.4 MG/1
TABLET SUBLINGUAL
Status: DISPENSED
Start: 2024-05-07 | End: 2024-05-07

## 2024-05-07 RX ORDER — GUAIFENESIN/DEXTROMETHORPHAN 100-10MG/5
5 SYRUP ORAL EVERY 4 HOURS PRN
Status: DISCONTINUED | OUTPATIENT
Start: 2024-05-07 | End: 2024-05-10 | Stop reason: HOSPADM

## 2024-05-07 RX ORDER — POLYETHYLENE GLYCOL 3350 17 G/17G
17 POWDER, FOR SOLUTION ORAL DAILY
Status: DISCONTINUED | OUTPATIENT
Start: 2024-05-07 | End: 2024-05-10 | Stop reason: HOSPADM

## 2024-05-07 RX ORDER — FAMOTIDINE 20 MG/1
20 TABLET, FILM COATED ORAL 2 TIMES DAILY
Status: DISCONTINUED | OUTPATIENT
Start: 2024-05-07 | End: 2024-05-10 | Stop reason: HOSPADM

## 2024-05-07 RX ORDER — ONDANSETRON 4 MG/1
4 TABLET, ORALLY DISINTEGRATING ORAL EVERY 8 HOURS PRN
Status: DISCONTINUED | OUTPATIENT
Start: 2024-05-07 | End: 2024-05-08

## 2024-05-07 RX ADMIN — IPRATROPIUM BROMIDE AND ALBUTEROL SULFATE 3 ML: .5; 3 SOLUTION RESPIRATORY (INHALATION) at 05:32

## 2024-05-07 RX ADMIN — GUAIFENESIN AND DEXTROMETHORPHAN 5 ML: 100; 10 SYRUP ORAL at 17:55

## 2024-05-07 RX ADMIN — PERFLUTREN 1.5 ML OF DILUTION: 6.52 INJECTION, SUSPENSION INTRAVENOUS at 16:03

## 2024-05-07 RX ADMIN — Medication 2 L/MIN: at 17:10

## 2024-05-07 RX ADMIN — FUROSEMIDE 20 MG: 10 INJECTION, SOLUTION INTRAVENOUS at 16:30

## 2024-05-07 RX ADMIN — CARVEDILOL 25 MG: 25 TABLET, FILM COATED ORAL at 21:02

## 2024-05-07 RX ADMIN — Medication 2 L/MIN: at 05:36

## 2024-05-07 RX ADMIN — ATORVASTATIN CALCIUM 20 MG: 10 TABLET, FILM COATED ORAL at 21:02

## 2024-05-07 RX ADMIN — BUMETANIDE 1 MG: 0.25 INJECTION INTRAMUSCULAR; INTRAVENOUS at 08:31

## 2024-05-07 RX ADMIN — IPRATROPIUM BROMIDE AND ALBUTEROL SULFATE 3 ML: .5; 3 SOLUTION RESPIRATORY (INHALATION) at 17:10

## 2024-05-07 RX ADMIN — FAMOTIDINE 20 MG: 20 TABLET, FILM COATED ORAL at 21:02

## 2024-05-07 RX ADMIN — SODIUM CHLORIDE, POTASSIUM CHLORIDE, SODIUM LACTATE AND CALCIUM CHLORIDE 500 ML: 600; 310; 30; 20 INJECTION, SOLUTION INTRAVENOUS at 06:41

## 2024-05-07 RX ADMIN — NITROGLYCERIN 0.4 MG: 0.4 TABLET SUBLINGUAL at 08:31

## 2024-05-07 RX ADMIN — IPRATROPIUM BROMIDE AND ALBUTEROL SULFATE 3 ML: .5; 3 SOLUTION RESPIRATORY (INHALATION) at 20:13

## 2024-05-07 RX ADMIN — AMLODIPINE BESYLATE 5 MG: 5 TABLET ORAL at 21:32

## 2024-05-07 RX ADMIN — AZITHROMYCIN 500 MG: 500 INJECTION, POWDER, LYOPHILIZED, FOR SOLUTION INTRAVENOUS at 07:33

## 2024-05-07 RX ADMIN — CEFTRIAXONE 1 G: 1 INJECTION, SOLUTION INTRAVENOUS at 06:43

## 2024-05-07 RX ADMIN — INSULIN HUMAN 10 UNITS: 100 INJECTION, SUSPENSION SUBCUTANEOUS at 20:06

## 2024-05-07 RX ADMIN — Medication 2 L/MIN: at 20:13

## 2024-05-07 SDOH — SOCIAL STABILITY: SOCIAL INSECURITY: ARE THERE ANY APPARENT SIGNS OF INJURIES/BEHAVIORS THAT COULD BE RELATED TO ABUSE/NEGLECT?: YES

## 2024-05-07 SDOH — SOCIAL STABILITY: SOCIAL INSECURITY: HAVE YOU HAD ANY THOUGHTS OF HARMING ANYONE ELSE?: YES

## 2024-05-07 SDOH — SOCIAL STABILITY: SOCIAL INSECURITY: HAS ANYONE EVER THREATENED TO HURT YOUR FAMILY OR YOUR PETS?: YES

## 2024-05-07 SDOH — SOCIAL STABILITY: SOCIAL INSECURITY: WERE YOU ABLE TO COMPLETE ALL THE BEHAVIORAL HEALTH SCREENINGS?: YES

## 2024-05-07 SDOH — SOCIAL STABILITY: SOCIAL INSECURITY: ABUSE: ADULT

## 2024-05-07 SDOH — SOCIAL STABILITY: SOCIAL INSECURITY: HAVE YOU HAD THOUGHTS OF HARMING ANYONE ELSE?: NO

## 2024-05-07 SDOH — SOCIAL STABILITY: SOCIAL INSECURITY: DOES ANYONE TRY TO KEEP YOU FROM HAVING/CONTACTING OTHER FRIENDS OR DOING THINGS OUTSIDE YOUR HOME?: YES

## 2024-05-07 SDOH — SOCIAL STABILITY: SOCIAL INSECURITY: DO YOU FEEL UNSAFE GOING BACK TO THE PLACE WHERE YOU ARE LIVING?: YES

## 2024-05-07 SDOH — SOCIAL STABILITY: SOCIAL INSECURITY: DO YOU FEEL ANYONE HAS EXPLOITED OR TAKEN ADVANTAGE OF YOU FINANCIALLY OR OF YOUR PERSONAL PROPERTY?: YES

## 2024-05-07 SDOH — SOCIAL STABILITY: SOCIAL INSECURITY: ARE YOU OR HAVE YOU BEEN THREATENED OR ABUSED PHYSICALLY, EMOTIONALLY, OR SEXUALLY BY ANYONE?: YES

## 2024-05-07 ASSESSMENT — ENCOUNTER SYMPTOMS
ALLERGIC/IMMUNOLOGIC NEGATIVE: 1
SHORTNESS OF BREATH: 1
PSYCHIATRIC NEGATIVE: 1
GASTROINTESTINAL NEGATIVE: 1
HEMATOLOGIC/LYMPHATIC NEGATIVE: 1
CARDIOVASCULAR NEGATIVE: 1
CONSTITUTIONAL NEGATIVE: 1
ENDOCRINE NEGATIVE: 1
NEUROLOGICAL NEGATIVE: 1
EYES NEGATIVE: 1
MUSCULOSKELETAL NEGATIVE: 1
COUGH: 1

## 2024-05-07 ASSESSMENT — PAIN - FUNCTIONAL ASSESSMENT
PAIN_FUNCTIONAL_ASSESSMENT: 0-10

## 2024-05-07 ASSESSMENT — ACTIVITIES OF DAILY LIVING (ADL)
JUDGMENT_ADEQUATE_SAFELY_COMPLETE_DAILY_ACTIVITIES: YES
LACK_OF_TRANSPORTATION: NO
FEEDING YOURSELF: INDEPENDENT
DRESSING YOURSELF: NEEDS ASSISTANCE
BATHING: NEEDS ASSISTANCE
HEARING - LEFT EAR: FUNCTIONAL
GROOMING: INDEPENDENT
HEARING - RIGHT EAR: FUNCTIONAL
PATIENT'S MEMORY ADEQUATE TO SAFELY COMPLETE DAILY ACTIVITIES?: YES
TOILETING: NEEDS ASSISTANCE
WALKS IN HOME: INDEPENDENT
ADEQUATE_TO_COMPLETE_ADL: YES

## 2024-05-07 ASSESSMENT — COGNITIVE AND FUNCTIONAL STATUS - GENERAL
DRESSING REGULAR UPPER BODY CLOTHING: A LITTLE
TOILETING: A LITTLE
DAILY ACTIVITIY SCORE: 19
PERSONAL GROOMING: A LITTLE
CLIMB 3 TO 5 STEPS WITH RAILING: A LITTLE
DRESSING REGULAR LOWER BODY CLOTHING: A LITTLE
PATIENT BASELINE BEDBOUND: NO
HELP NEEDED FOR BATHING: A LITTLE
MOBILITY SCORE: 23

## 2024-05-07 ASSESSMENT — COLUMBIA-SUICIDE SEVERITY RATING SCALE - C-SSRS
2. HAVE YOU ACTUALLY HAD ANY THOUGHTS OF KILLING YOURSELF?: NO
6. HAVE YOU EVER DONE ANYTHING, STARTED TO DO ANYTHING, OR PREPARED TO DO ANYTHING TO END YOUR LIFE?: NO
1. IN THE PAST MONTH, HAVE YOU WISHED YOU WERE DEAD OR WISHED YOU COULD GO TO SLEEP AND NOT WAKE UP?: NO

## 2024-05-07 ASSESSMENT — PAIN SCALES - GENERAL
PAINLEVEL_OUTOF10: 0 - NO PAIN

## 2024-05-07 ASSESSMENT — LIFESTYLE VARIABLES
HOW OFTEN DO YOU HAVE 6 OR MORE DRINKS ON ONE OCCASION: NEVER
AUDIT-C TOTAL SCORE: 0
HOW OFTEN DO YOU HAVE A DRINK CONTAINING ALCOHOL: NEVER
HOW MANY STANDARD DRINKS CONTAINING ALCOHOL DO YOU HAVE ON A TYPICAL DAY: PATIENT DOES NOT DRINK
SKIP TO QUESTIONS 9-10: 1
AUDIT-C TOTAL SCORE: 0

## 2024-05-07 ASSESSMENT — PATIENT HEALTH QUESTIONNAIRE - PHQ9
1. LITTLE INTEREST OR PLEASURE IN DOING THINGS: NOT AT ALL
2. FEELING DOWN, DEPRESSED OR HOPELESS: NOT AT ALL
SUM OF ALL RESPONSES TO PHQ9 QUESTIONS 1 & 2: 0

## 2024-05-07 NOTE — ED PROVIDER NOTES
Chief Complaint: Cough, shortness of breath  HPI: This is a 89-year-old male, past medical history significant for B-cell lymphoma, multiple myeloma, atrial fibrillation, CHF, presenting to the emergency department for evaluation of worsening cough and shortness of breath for the last week.  Patient denies any chest pain, fevers, chills.  He states that the cough is keeping him up at night and so he presents to the emergency department.    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    Stenosis of both lacrimal ducts    Acquired stenosis of left nasolacrimal duct 06/01/2021    Stenosis of left lacrimal duct    Body mass index (BMI) 31.0-31.9, adult 03/02/2020    Body mass index (BMI) of 31.0 to 31.9 in adult    Disorder of the skin and subcutaneous tissue, unspecified     Skin lesion of face    Hepatomegaly, not elsewhere classified 04/01/2020    Liver mass    Low grade B-cell lymphoma (Multi)     Multiple myeloma (Multi)     Noninfective gastroenteritis and colitis, unspecified 08/31/2020    Enteritis    Ocular laceration without prolapse or loss of intraocular tissue, unspecified eye, initial encounter 02/15/2021    Eye laceration    Other abnormal findings in specimens from other organs, systems and tissues 05/18/2020    Abnormal bone marrow examination    Other conditions influencing health status 05/18/2020    Transition of care    Personal history of other diseases of the circulatory system     History of hypotension    Personal history of other diseases of the circulatory system 05/05/2020    History of orthostatic hypotension    Personal history of other diseases of the musculoskeletal system and connective tissue 03/10/2020    History of back pain    Personal history of other infectious  and parasitic diseases 10/19/2020    History of candidiasis of mouth    Personal history of other specified conditions 10/20/2020    History of weight loss    Shortness of breath 03/19/2020    SOB (shortness of breath) on exertion    Unsteadiness on feet 05/06/2020    General unsteadiness on examination    Unsteadiness on feet 05/06/2020    Unsteadiness      History reviewed. No pertinent surgical history.    Physical Exam  Constitutional:       Appearance: Normal appearance.   HENT:      Head: Normocephalic and atraumatic.      Mouth/Throat:      Mouth: Mucous membranes are moist.   Eyes:      Extraocular Movements: Extraocular movements intact.   Pulmonary:      Effort: Pulmonary effort is normal.      Breath sounds: Examination of the left-middle field reveals decreased breath sounds and rhonchi. Examination of the left-lower field reveals decreased breath sounds and rhonchi. Decreased breath sounds and rhonchi present. No wheezing.   Abdominal:      General: Abdomen is flat.      Palpations: Abdomen is soft.   Skin:     General: Skin is warm.   Neurological:      General: No focal deficit present.      Mental Status: He is alert.   Psychiatric:         Mood and Affect: Mood normal.          ED Course/[facility]  ED Course as of 05/07/24 0702   Tue May 07, 2024   0659 Dr. Ruby, Hospitalist, they accepted the patient in transfer   [JH]      ED Course User Index  [JH] Tootie Armas DO     EKG interpreted by myself (ED attending physician): Atrial fibrillation, rate of 80, normal axis, normal QRS and QT intervals, nonspecific ST segment changes, nonischemic EKG    This is a 89 y.o. male presenting to the ED for evaluation of shortness of breath and cough for the last week.  Patient states symptoms are increasing in severity, and so he presents to the emergency department.  On physical exam, the patient does have some increased work of breathing, and is requiring oxygen to stay above 90%.  He has some diminished  breath sounds on the left as well as slight rhonchi.  Lab work did show elevated BNP, however was otherwise grossly unremarkable and at the patient's baseline.  Chest CT without IV contrast does show evidence of pneumonia, which is likely the cause of the patient's symptoms.  As the patient is requiring oxygen, I do feel that he will require admission for further treatment.  He was given ceftriaxone and azithromycin as well as started on some fluids.  Pt was accepted in transfer to [facility] for further care as this facility does not have beds.     Final Impression  1. pneumonia  Disposition/Plan:  Signout  Condition at disposition: Stable.     Tootie Armas DO  Emergency Medicine Physician     Tootie Armas,   05/07/24 0648       Tootie Armas,   05/07/24 0702

## 2024-05-07 NOTE — NURSING NOTE
Admitted with Pneumonia. On oxygen 2 liters . Coughing at present. Seen by NP Terrell. Patient would like to be DNR. Has not brought the papers for Power of . Family will be bringing in papers.

## 2024-05-07 NOTE — H&P
History Of Present Illness  Shady Becerril is a 89 y.o. male presenting with shortness of breath. Pt states that Monday afternoon he developed a dry cough. As the night progressed, the cough began to get more frequent and bothersome. The patient said he went to bed Monday night and woke up in the middle of the night very short of breath and coughing. Imaging in the ED revealed some LLL PNA as well as some fluid overload/chf and an CHARLENE. Pt admitted to med/surg.    ED VS: HR 78, RR 22, /67, Sp02 88%RA    Imaging: CT chest- 1. Moderate size left pleural effusion with left lower lobe and  lingular consolidation/atelectasis. Consider pneumonia.  2. Stable findings of prior granulomatous disease. \ small right  pleural effusion.  3. Left atrial enlargement.  4. Indeterminate focal region of fat stranding along the left lateral  aspect of the pectoralis major muscle measuring 2.3 x 1.5 cm on axial  image 125/314.   this could reflect focal infectious/inflammatory  process, atypical region of gynecomastia, or other soft tissue mass.  Please correlate with physical exam.  CXR- Findings of mild ongoing CHF with tiny right pleural effusion, and  small to moderate-sized left pleural effusion with associated left  basilar atelectasis.  Previous CABG.  Stable right-sided central venous MediPort.    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    Stenosis of both lacrimal ducts    Acquired stenosis of left nasolacrimal duct 06/01/2021    Stenosis of left lacrimal duct    Body mass index (BMI) 31.0-31.9, adult 03/02/2020    Body mass index (BMI) of 31.0 to 31.9 in adult     Disorder of the skin and subcutaneous tissue, unspecified     Skin lesion of face    Hepatomegaly, not elsewhere classified 04/01/2020    Liver mass    Low grade B-cell lymphoma (Multi)     Multiple myeloma (Multi)     Noninfective gastroenteritis and colitis, unspecified 08/31/2020    Enteritis    Ocular laceration without prolapse or loss of intraocular tissue, unspecified eye, initial encounter 02/15/2021    Eye laceration    Other abnormal findings in specimens from other organs, systems and tissues 05/18/2020    Abnormal bone marrow examination    Other conditions influencing health status 05/18/2020    Transition of care    Personal history of other diseases of the circulatory system     History of hypotension    Personal history of other diseases of the circulatory system 05/05/2020    History of orthostatic hypotension    Personal history of other diseases of the musculoskeletal system and connective tissue 03/10/2020    History of back pain    Personal history of other infectious and parasitic diseases 10/19/2020    History of candidiasis of mouth    Personal history of other specified conditions 10/20/2020    History of weight loss    Shortness of breath 03/19/2020    SOB (shortness of breath) on exertion    Unsteadiness on feet 05/06/2020    General unsteadiness on examination    Unsteadiness on feet 05/06/2020    Unsteadiness       Surgical History  History reviewed. No pertinent surgical history.     Social History  He reports that he quit smoking about 44 years ago. His smoking use included pipe. He has never used smokeless tobacco. He reports that he does not currently use alcohol. He reports that he does not use drugs.    Family History  No family history on file.     Allergies  Patient has no known allergies.    Review of Systems   Constitutional: Negative.    HENT: Negative.     Eyes: Negative.    Respiratory:  Positive for cough and shortness of breath.    Cardiovascular: Negative.   "  Gastrointestinal: Negative.    Endocrine: Negative.    Genitourinary: Negative.    Musculoskeletal: Negative.    Skin: Negative.    Allergic/Immunologic: Negative.    Neurological: Negative.    Hematological: Negative.    Psychiatric/Behavioral: Negative.          Physical Exam  Vitals reviewed.   HENT:      Head: Normocephalic and atraumatic.      Right Ear: External ear normal.      Left Ear: External ear normal.      Nose: Nose normal.      Mouth/Throat:      Pharynx: Oropharynx is clear.   Eyes:      Conjunctiva/sclera: Conjunctivae normal.   Cardiovascular:      Rate and Rhythm: Normal rate. Rhythm irregular.      Pulses: Normal pulses.      Heart sounds: Normal heart sounds.   Pulmonary:      Comments: Faint crackles   Abdominal:      General: Bowel sounds are normal.      Palpations: Abdomen is soft.   Musculoskeletal:         General: Normal range of motion.      Cervical back: Normal range of motion and neck supple.   Skin:     General: Skin is dry.   Neurological:      General: No focal deficit present.      Mental Status: He is alert and oriented to person, place, and time.   Psychiatric:         Mood and Affect: Mood normal.         Behavior: Behavior normal.          Last Recorded Vitals  Blood pressure (!) 133/45, pulse 97, resp. rate (!) 28, height 1.676 m (5' 6\"), weight 69.9 kg (154 lb), SpO2 99%.    Relevant Results  Scheduled medications     Continuous medications     PRN medications  PRN medications: oxygen    Results for orders placed or performed during the hospital encounter of 05/07/24 (from the past 24 hour(s))   Sars-CoV-2 PCR   Result Value Ref Range    Coronavirus 2019, PCR Not Detected Not Detected   Influenza A, and B PCR   Result Value Ref Range    Flu A Result Not Detected Not Detected    Flu B Result Not Detected Not Detected   CBC and Auto Differential   Result Value Ref Range    WBC 3.9 (L) 4.4 - 11.3 x10*3/uL    nRBC 0.0 0.0 - 0.0 /100 WBCs    RBC 3.54 (L) 4.50 - 5.90 x10*6/uL "    Hemoglobin 11.4 (L) 13.5 - 17.5 g/dL    Hematocrit 34.6 (L) 41.0 - 52.0 %    MCV 98 80 - 100 fL    MCH 32.2 26.0 - 34.0 pg    MCHC 32.9 32.0 - 36.0 g/dL    RDW 14.3 11.5 - 14.5 %    Platelets 128 (L) 150 - 450 x10*3/uL    Neutrophils % 57.2 40.0 - 80.0 %    Immature Granulocytes %, Automated 0.8 0.0 - 0.9 %    Lymphocytes % 25.3 13.0 - 44.0 %    Monocytes % 6.9 2.0 - 10.0 %    Eosinophils % 9.0 0.0 - 6.0 %    Basophils % 0.8 0.0 - 2.0 %    Neutrophils Absolute 2.24 1.60 - 5.50 x10*3/uL    Immature Granulocytes Absolute, Automated 0.03 0.00 - 0.50 x10*3/uL    Lymphocytes Absolute 0.99 0.80 - 3.00 x10*3/uL    Monocytes Absolute 0.27 0.05 - 0.80 x10*3/uL    Eosinophils Absolute 0.35 0.00 - 0.40 x10*3/uL    Basophils Absolute 0.03 0.00 - 0.10 x10*3/uL   Comprehensive metabolic panel   Result Value Ref Range    Glucose 69 (L) 74 - 99 mg/dL    Sodium 137 136 - 145 mmol/L    Potassium 5.3 3.5 - 5.3 mmol/L    Chloride 105 98 - 107 mmol/L    Bicarbonate 24 21 - 32 mmol/L    Anion Gap 13 10 - 20 mmol/L    Urea Nitrogen 30 (H) 6 - 23 mg/dL    Creatinine 1.64 (H) 0.50 - 1.30 mg/dL    eGFR 39 (L) >60 mL/min/1.73m*2    Calcium 8.7 8.6 - 10.3 mg/dL    Albumin 3.3 (L) 3.4 - 5.0 g/dL    Alkaline Phosphatase 101 33 - 136 U/L    Total Protein 7.8 6.4 - 8.2 g/dL    AST 23 9 - 39 U/L    Bilirubin, Total 1.0 0.0 - 1.2 mg/dL    ALT 11 10 - 52 U/L   Troponin I, High Sensitivity   Result Value Ref Range    Troponin I, High Sensitivity 20 0 - 20 ng/L   B-Type Natriuretic Peptide   Result Value Ref Range     (H) 0 - 99 pg/mL   Morphology   Result Value Ref Range    RBC Morphology No significant RBC morphology present         Assessment/Plan   Principal Problem:    Pneumonia, unspecified organism      #Acute hypoxic respiratory failure  #Pneumonia  -CXR: Findings of mild ongoing CHF with tiny right pleural effusion, and  small to moderate-sized left pleural effusion with associated left  basilar atelectasis. Previous CABG.  Stable  right-sided central venous MediPort.  -CT chest: 1. Moderate size left pleural effusion with left lower lobe and  lingular consolidation/atelectasis. Consider pneumonia.  2. Stable findings of prior granulomatous disease. \ small right  pleural effusion.  3. Left atrial enlargement.  4. Indeterminate focal region of fat stranding along the left lateral  aspect of the pectoralis major muscle measuring 2.3 x 1.5 cm on axial  image 125/314.   this could reflect focal infectious/inflammatory  process, atypical region of gynecomastia, or other soft tissue mass.  Please correlate with physical exam.  -WBC 3.9  -Covid/flu negative  -Procal pending  -Sputum cx pending   -Supplemental oxygen to maintain an sp02 greater than 92%  -Currently on 1L  -Baseline RA  -RT to eval/treat  -Bronchodilators  -IV ceftriaxone, azithro (Day 1)    #Concern for acute congestive heart failure  #Pleural effusions   #CAD s/p CABG  #Paroxysmal atrial fibrillation  #HLD  #Essential HTN  -CT chest: 1. Moderate size left pleural effusion with left lower lobe and  lingular consolidation/atelectasis. Consider pneumonia.  2. Stable findings of prior granulomatous disease. \ small right  pleural effusion.  3. Left atrial enlargement.  4. Indeterminate focal region of fat stranding along the left lateral  aspect of the pectoralis major muscle measuring 2.3 x 1.5 cm on axial  image 125/314.   this could reflect focal infectious/inflammatory  process, atypical region of gynecomastia, or other soft tissue mass.  Please correlate with physical exam.  -Echo pending  -  -Trop 20   -Daily wt  -Strict I/O  -Cardiac monitoring  -Cardiology consult, appreciate recs  -IVP furosemide 20mg BID  -Not a candidate for blood thinners due to GI bleeding  -Not a candidate for watchman per EMR chart review   -Continue amlodipine, ASA, atorvastatin, carvedilol     #CHARLENE  -Baseline creat ~ 1.2  -Likely caused from fluid overload, will diurese  -Daily BMP  -Renally dose  medications as able     #Iron deficiency anemia  -Hgb 11.4  -Continue ferrous sulfate  -Daily CBC    #DM Type II  -SSI with hypoglycemia protocol  -Monitor BG     #Hx of lymphoplasma lymphoma   -Continue lenalidomide     #Hypothyroidisim  -Continue levothyroxine    DVT ppx  -lovenox    PUD ppx  -pantoprazole    F: PRN  E: Replete per protocol  N: Cardiac  A: Mediport    Disposition: Pt requires more than 2 inpatient days at this time   Code Status:     Total accumulated time spent face to face and not face to face preparing to see the patient, obtaining and reviewing separately obtained history; performing a medically appropriate examination and/or evaluation; counseling and educating the patient, family; ordering medications, tests, or procedures; referring and communicating with other health care professionals; documenting clinical information in the patient's medical record; independently interpreting results and communicating the results to the patient, family; and care coordination was 45 minutes.          Stephenie Tejada, APRN-CNP

## 2024-05-07 NOTE — PROGRESS NOTES
Emergency Medicine Transition of Care Note.    I received Shady Becerril in signout from Dr. JOHN.  Please see the previous ED provider note for all HPI, PE and MDM up to the time of signout at 0700. This is in addition to the primary record.    In brief Shady Becerril is an 89 y.o. male presenting for   Chief Complaint   Patient presents with    Shortness of Breath     Started yesterday morning     At the time of signout we were awaiting: bed placement    ED Course as of 05/07/24 1156   Tue May 07, 2024   0659 Dr. Ruby, Hospitalist, they accepted the patient in transfer   [JH]   0830 EKG was performed at 823 showing atrial fibrillation and a ventricular rate of 76 poor study no indication of STEMI interpreted by me. [KA]      ED Course User Index  [JH] Tootie Armas DO  [KA] Blake Mendosa DO         Diagnoses as of 05/07/24 1156   Shortness of breath   Pneumonia due to infectious organism, unspecified laterality, unspecified part of lung       Medical Decision Making      Final diagnoses:   [R06.02] Shortness of breath   [J18.9] Pneumonia due to infectious organism, unspecified laterality, unspecified part of lung     Very pleasant 89-year-old gentleman presents to the ER with chief complaint of shortness of breath patient is found to have pneumonia potentially component of CHF exacerbation also.  I was called to the bedside due to him becoming more short of breath.  On exam patient appears to have some fluid overload for this reason gave him some nitro and some Bumex.  Patient markedly improved clinically.  He is resting comfortable alert oriented well-appearing patient be admitted to [facility] for further treatment.      Procedure  Procedures    Blake Mendosa DO

## 2024-05-08 ENCOUNTER — PREP FOR PROCEDURE (OUTPATIENT)
Dept: SURGERY | Facility: HOSPITAL | Age: 89
End: 2024-05-08
Payer: MEDICARE

## 2024-05-08 ENCOUNTER — APPOINTMENT (OUTPATIENT)
Dept: RADIOLOGY | Facility: HOSPITAL | Age: 89
DRG: 193 | End: 2024-05-08
Payer: MEDICARE

## 2024-05-08 ENCOUNTER — APPOINTMENT (OUTPATIENT)
Dept: CARDIOLOGY | Facility: HOSPITAL | Age: 89
DRG: 193 | End: 2024-05-08
Payer: MEDICARE

## 2024-05-08 LAB
ANION GAP SERPL CALC-SCNC: 12 MMOL/L (ref 10–20)
BUN SERPL-MCNC: 29 MG/DL (ref 6–23)
CALCIUM SERPL-MCNC: 8.3 MG/DL (ref 8.6–10.3)
CHLORIDE SERPL-SCNC: 100 MMOL/L (ref 98–107)
CO2 SERPL-SCNC: 29 MMOL/L (ref 21–32)
CREAT SERPL-MCNC: 1.75 MG/DL (ref 0.5–1.3)
EGFRCR SERPLBLD CKD-EPI 2021: 37 ML/MIN/1.73M*2
ERYTHROCYTE [DISTWIDTH] IN BLOOD BY AUTOMATED COUNT: 14.5 % (ref 11.5–14.5)
EST. AVERAGE GLUCOSE BLD GHB EST-MCNC: 108 MG/DL
GLUCOSE BLD MANUAL STRIP-MCNC: 132 MG/DL (ref 74–99)
GLUCOSE BLD MANUAL STRIP-MCNC: 84 MG/DL (ref 74–99)
GLUCOSE SERPL-MCNC: 88 MG/DL (ref 74–99)
HBA1C MFR BLD: 5.4 %
HCT VFR BLD AUTO: 28.7 % (ref 41–52)
HGB BLD-MCNC: 9.8 G/DL (ref 13.5–17.5)
MCH RBC QN AUTO: 32.6 PG (ref 26–34)
MCHC RBC AUTO-ENTMCNC: 34.1 G/DL (ref 32–36)
MCV RBC AUTO: 95 FL (ref 80–100)
NRBC BLD-RTO: 0 /100 WBCS (ref 0–0)
PLATELET # BLD AUTO: 138 X10*3/UL (ref 150–450)
POTASSIUM SERPL-SCNC: 3.6 MMOL/L (ref 3.5–5.3)
PROCALCITONIN SERPL-MCNC: 2.63 NG/ML
RBC # BLD AUTO: 3.01 X10*6/UL (ref 4.5–5.9)
SODIUM SERPL-SCNC: 137 MMOL/L (ref 136–145)
WBC # BLD AUTO: 3.2 X10*3/UL (ref 4.4–11.3)

## 2024-05-08 PROCEDURE — 2500000001 HC RX 250 WO HCPCS SELF ADMINISTERED DRUGS (ALT 637 FOR MEDICARE OP): Mod: IPSPLIT

## 2024-05-08 PROCEDURE — 94760 N-INVAS EAR/PLS OXIMETRY 1: CPT | Mod: IPSPLIT

## 2024-05-08 PROCEDURE — 85027 COMPLETE CBC AUTOMATED: CPT

## 2024-05-08 PROCEDURE — 2500000005 HC RX 250 GENERAL PHARMACY W/O HCPCS: Mod: IPSPLIT

## 2024-05-08 PROCEDURE — 71045 X-RAY EXAM CHEST 1 VIEW: CPT | Performed by: RADIOLOGY

## 2024-05-08 PROCEDURE — 94668 MNPJ CHEST WALL SBSQ: CPT | Mod: IPSPLIT

## 2024-05-08 PROCEDURE — 93005 ELECTROCARDIOGRAM TRACING: CPT | Mod: IPSPLIT

## 2024-05-08 PROCEDURE — 94640 AIRWAY INHALATION TREATMENT: CPT

## 2024-05-08 PROCEDURE — 71045 X-RAY EXAM CHEST 1 VIEW: CPT | Mod: IPSPLIT

## 2024-05-08 PROCEDURE — 2500000006 HC RX 250 W HCPCS SELF ADMINISTERED DRUGS (ALT 637 FOR ALL PAYERS)

## 2024-05-08 PROCEDURE — 2500000004 HC RX 250 GENERAL PHARMACY W/ HCPCS (ALT 636 FOR OP/ED)

## 2024-05-08 PROCEDURE — 1100000001 HC PRIVATE ROOM DAILY: Mod: IPSPLIT

## 2024-05-08 PROCEDURE — 2500000005 HC RX 250 GENERAL PHARMACY W/O HCPCS: Performed by: STUDENT IN AN ORGANIZED HEALTH CARE EDUCATION/TRAINING PROGRAM

## 2024-05-08 PROCEDURE — 82947 ASSAY GLUCOSE BLOOD QUANT: CPT

## 2024-05-08 PROCEDURE — 2500000004 HC RX 250 GENERAL PHARMACY W/ HCPCS (ALT 636 FOR OP/ED): Mod: IPSPLIT | Performed by: INTERNAL MEDICINE

## 2024-05-08 PROCEDURE — 2500000005 HC RX 250 GENERAL PHARMACY W/O HCPCS: Mod: IPSPLIT | Performed by: INTERNAL MEDICINE

## 2024-05-08 PROCEDURE — 96376 TX/PRO/DX INJ SAME DRUG ADON: CPT | Mod: IPSPLIT

## 2024-05-08 PROCEDURE — 83036 HEMOGLOBIN GLYCOSYLATED A1C: CPT | Mod: GENLAB

## 2024-05-08 PROCEDURE — 2500000001 HC RX 250 WO HCPCS SELF ADMINISTERED DRUGS (ALT 637 FOR MEDICARE OP): Mod: IPSPLIT | Performed by: NURSE PRACTITIONER

## 2024-05-08 PROCEDURE — 80048 BASIC METABOLIC PNL TOTAL CA: CPT

## 2024-05-08 PROCEDURE — 2500000002 HC RX 250 W HCPCS SELF ADMINISTERED DRUGS (ALT 637 FOR MEDICARE OP, ALT 636 FOR OP/ED): Mod: IPSPLIT | Performed by: NURSE PRACTITIONER

## 2024-05-08 PROCEDURE — 99222 1ST HOSP IP/OBS MODERATE 55: CPT | Performed by: SURGERY

## 2024-05-08 PROCEDURE — 2500000002 HC RX 250 W HCPCS SELF ADMINISTERED DRUGS (ALT 637 FOR MEDICARE OP, ALT 636 FOR OP/ED)

## 2024-05-08 RX ORDER — SODIUM CHLORIDE 9 MG/ML
10 INJECTION, SOLUTION INTRAVENOUS CONTINUOUS PRN
Status: DISCONTINUED | OUTPATIENT
Start: 2024-05-08 | End: 2024-05-10 | Stop reason: HOSPADM

## 2024-05-08 RX ORDER — ACYCLOVIR 200 MG/1
400 CAPSULE ORAL DAILY
Status: DISCONTINUED | OUTPATIENT
Start: 2024-05-08 | End: 2024-05-10 | Stop reason: HOSPADM

## 2024-05-08 RX ORDER — HEPARIN SODIUM,PORCINE/PF 10 UNIT/ML
50 SYRINGE (ML) INTRAVENOUS EVERY 12 HOURS
Status: DISCONTINUED | OUTPATIENT
Start: 2024-05-08 | End: 2024-05-10 | Stop reason: HOSPADM

## 2024-05-08 RX ORDER — HEPARIN 100 UNIT/ML
500 SYRINGE INTRAVENOUS ONCE AS NEEDED
Status: DISCONTINUED | OUTPATIENT
Start: 2024-05-08 | End: 2024-05-10 | Stop reason: HOSPADM

## 2024-05-08 RX ORDER — LEVOTHYROXINE SODIUM 25 UG/1
25 TABLET ORAL NIGHTLY
Status: DISCONTINUED | OUTPATIENT
Start: 2024-05-08 | End: 2024-05-10 | Stop reason: HOSPADM

## 2024-05-08 RX ORDER — IPRATROPIUM BROMIDE AND ALBUTEROL SULFATE 2.5; .5 MG/3ML; MG/3ML
3 SOLUTION RESPIRATORY (INHALATION)
Status: DISCONTINUED | OUTPATIENT
Start: 2024-05-08 | End: 2024-05-10 | Stop reason: HOSPADM

## 2024-05-08 RX ORDER — PANTOPRAZOLE SODIUM 40 MG/1
40 TABLET, DELAYED RELEASE ORAL NIGHTLY
Status: DISCONTINUED | OUTPATIENT
Start: 2024-05-08 | End: 2024-05-10 | Stop reason: HOSPADM

## 2024-05-08 RX ORDER — LIDOCAINE HYDROCHLORIDE 10 MG/ML
20 INJECTION INFILTRATION; PERINEURAL ONCE
Status: DISCONTINUED | OUTPATIENT
Start: 2024-05-08 | End: 2024-05-10 | Stop reason: HOSPADM

## 2024-05-08 RX ORDER — PANTOPRAZOLE SODIUM 40 MG/10ML
40 INJECTION, POWDER, LYOPHILIZED, FOR SOLUTION INTRAVENOUS NIGHTLY
Status: DISCONTINUED | OUTPATIENT
Start: 2024-05-08 | End: 2024-05-08

## 2024-05-08 RX ORDER — ACYCLOVIR 400 MG/1
400 TABLET ORAL DAILY
Status: DISCONTINUED | OUTPATIENT
Start: 2024-05-08 | End: 2024-05-08

## 2024-05-08 RX ORDER — SODIUM CHLORIDE 9 MG/ML
INJECTION, SOLUTION INTRAVENOUS
Status: COMPLETED
Start: 2024-05-08 | End: 2024-05-08

## 2024-05-08 RX ORDER — ACETAMINOPHEN 500 MG
5000 TABLET ORAL DAILY
Status: DISCONTINUED | OUTPATIENT
Start: 2024-05-08 | End: 2024-05-10 | Stop reason: HOSPADM

## 2024-05-08 RX ADMIN — Medication 1 L/MIN: at 21:31

## 2024-05-08 RX ADMIN — Medication 2 L/MIN: at 00:00

## 2024-05-08 RX ADMIN — CHOLECALCIFEROL TAB 125 MCG (5000 UNIT) 5000 UNITS: 125 TAB at 10:45

## 2024-05-08 RX ADMIN — LEVOTHYROXINE SODIUM 25 MCG: 25 TABLET ORAL at 20:58

## 2024-05-08 RX ADMIN — CARVEDILOL 25 MG: 25 TABLET, FILM COATED ORAL at 20:58

## 2024-05-08 RX ADMIN — Medication 50 UNITS: at 21:16

## 2024-05-08 RX ADMIN — Medication 2 L/MIN: at 09:16

## 2024-05-08 RX ADMIN — ACYCLOVIR 400 MG: 200 CAPSULE ORAL at 10:45

## 2024-05-08 RX ADMIN — FAMOTIDINE 20 MG: 20 TABLET, FILM COATED ORAL at 20:58

## 2024-05-08 RX ADMIN — AMLODIPINE BESYLATE 5 MG: 5 TABLET ORAL at 20:58

## 2024-05-08 RX ADMIN — ASPIRIN 81 MG: 81 TABLET, COATED ORAL at 09:24

## 2024-05-08 RX ADMIN — CEFTRIAXONE 1 G: 1 INJECTION, SOLUTION INTRAVENOUS at 09:19

## 2024-05-08 RX ADMIN — PANTOPRAZOLE SODIUM 40 MG: 40 TABLET, DELAYED RELEASE ORAL at 20:58

## 2024-05-08 RX ADMIN — CARVEDILOL 25 MG: 25 TABLET, FILM COATED ORAL at 09:18

## 2024-05-08 RX ADMIN — ATORVASTATIN CALCIUM 20 MG: 10 TABLET, FILM COATED ORAL at 20:58

## 2024-05-08 RX ADMIN — INSULIN HUMAN 10 UNITS: 100 INJECTION, SUSPENSION SUBCUTANEOUS at 09:05

## 2024-05-08 RX ADMIN — IPRATROPIUM BROMIDE AND ALBUTEROL SULFATE 3 ML: .5; 3 SOLUTION RESPIRATORY (INHALATION) at 09:16

## 2024-05-08 RX ADMIN — FERROUS SULFATE TAB 325 MG (65 MG ELEMENTAL FE) 1 TABLET: 325 (65 FE) TAB at 09:24

## 2024-05-08 RX ADMIN — IPRATROPIUM BROMIDE AND ALBUTEROL SULFATE 3 ML: .5; 3 SOLUTION RESPIRATORY (INHALATION) at 21:29

## 2024-05-08 RX ADMIN — FAMOTIDINE 20 MG: 20 TABLET, FILM COATED ORAL at 09:19

## 2024-05-08 RX ADMIN — INSULIN HUMAN 10 UNITS: 100 INJECTION, SUSPENSION SUBCUTANEOUS at 17:24

## 2024-05-08 RX ADMIN — SODIUM CHLORIDE 250 ML: 9 INJECTION, SOLUTION INTRAVENOUS at 00:39

## 2024-05-08 RX ADMIN — AZITHROMYCIN DIHYDRATE 500 MG: 250 TABLET, FILM COATED ORAL at 09:18

## 2024-05-08 RX ADMIN — GUAIFENESIN AND DEXTROMETHORPHAN 5 ML: 100; 10 SYRUP ORAL at 22:54

## 2024-05-08 RX ADMIN — Medication 50 UNITS: at 10:45

## 2024-05-08 RX ADMIN — FUROSEMIDE 20 MG: 10 INJECTION, SOLUTION INTRAVENOUS at 09:18

## 2024-05-08 RX ADMIN — IPRATROPIUM BROMIDE AND ALBUTEROL SULFATE 3 ML: .5; 3 SOLUTION RESPIRATORY (INHALATION) at 15:54

## 2024-05-08 ASSESSMENT — PAIN SCALES - GENERAL
PAINLEVEL_OUTOF10: 0 - NO PAIN
PAINLEVEL_OUTOF10: 0 - NO PAIN

## 2024-05-08 ASSESSMENT — ENCOUNTER SYMPTOMS: SHORTNESS OF BREATH: 1

## 2024-05-08 NOTE — CONSULTS
Shortness of Breath      This is a consult for patient with a left pleural effusion.  This a patient who had noticed recently having a persisting cough and some shortness of breath.  He eventually came to the emergency room and was noted to have a moderate left pleural effusion.  By his account he had a pleural effusion for a while.  He was noted to have compression of the lingula of the lung with consolidation.  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    Stenosis of both lacrimal ducts    Acquired stenosis of left nasolacrimal duct 06/01/2021    Stenosis of left lacrimal duct    Body mass index (BMI) 31.0-31.9, adult 03/02/2020    Body mass index (BMI) of 31.0 to 31.9 in adult    Disorder of the skin and subcutaneous tissue, unspecified     Skin lesion of face    Hepatomegaly, not elsewhere classified 04/01/2020    Liver mass    Low grade B-cell lymphoma (Multi)     Multiple myeloma (Multi)     Noninfective gastroenteritis and colitis, unspecified 08/31/2020    Enteritis    Ocular laceration without prolapse or loss of intraocular tissue, unspecified eye, initial encounter 02/15/2021    Eye laceration    Other abnormal findings in specimens from other organs, systems and tissues 05/18/2020    Abnormal bone marrow examination    Other conditions influencing health status 05/18/2020    Transition of care    Personal history of other diseases of the circulatory system     History of hypotension    Personal history of other diseases of the circulatory system 05/05/2020    History of orthostatic hypotension    Personal history of other diseases of the musculoskeletal system and connective tissue 03/10/2020    History of back pain    Personal history of other infectious and parasitic diseases  10/19/2020    History of candidiasis of mouth    Personal history of other specified conditions 10/20/2020    History of weight loss    Shortness of breath 03/19/2020    SOB (shortness of breath) on exertion    Unsteadiness on feet 05/06/2020    General unsteadiness on examination    Unsteadiness on feet 05/06/2020    Unsteadiness          Current Facility-Administered Medications:     acetaminophen (Tylenol) tablet 650 mg, 650 mg, oral, q4h PRN **OR** [DISCONTINUED] acetaminophen (Tylenol) oral liquid 650 mg, 650 mg, nasogastric tube, q4h PRN **OR** [DISCONTINUED] acetaminophen (Tylenol) suppository 650 mg, 650 mg, rectal, q4h PRN, Bambi Rosales PA-C    acetaminophen (Tylenol) tablet 650 mg, 650 mg, oral, q4h PRN **OR** [DISCONTINUED] acetaminophen (Tylenol) oral liquid 650 mg, 650 mg, oral, q4h PRN **OR** [DISCONTINUED] acetaminophen (Tylenol) suppository 650 mg, 650 mg, rectal, q4h PRN, Bambi Rosales PA-C    acyclovir (Zovirax) capsule 400 mg, 400 mg, oral, Daily, CORTEZ Snider, 400 mg at 05/08/24 1045    albuterol 2.5 mg /3 mL (0.083 %) nebulizer solution 2.5 mg, 2.5 mg, nebulization, q2h PRN, CORTEZ Ramirez    alteplase (Cathflo Activase) injection 2 mg, 2 mg, intra-catheter, PRN, Priti Lawson MD    amLODIPine (Norvasc) tablet 5 mg, 5 mg, oral, Daily, CORTEZ Cannon, 5 mg at 05/07/24 2132    aspirin EC tablet 81 mg, 81 mg, oral, Daily, CORTEZ Ramirez, 81 mg at 05/08/24 0924    atorvastatin (Lipitor) tablet 20 mg, 20 mg, oral, Nightly, CORTEZ Ramirez, 20 mg at 05/07/24 2102    azithromycin (Zithromax) tablet 500 mg, 500 mg, oral, Daily, CORTEZ Snider, 500 mg at 05/08/24 0918    benzocaine-menthol (Cepastat Sore Throat) lozenge 1 lozenge, 1 lozenge, Mouth/Throat, q2h PRN, Bambi Rosales PA-C    carvedilol (Coreg) tablet 25 mg, 25 mg, oral, BID with meals, CORTEZ Ramirez, 25 mg at 05/08/24 0918    cefTRIAXone (Rocephin) IVPB 1 g,  1 g, intravenous, q24h, Bambi Rosales PA-C, Stopped at 05/08/24 0949    cholecalciferol (Vitamin D-3) tablet 5,000 Units, 5,000 Units, oral, Daily, CORTEZ Snider, 5,000 Units at 05/08/24 1045    dextromethorphan-guaifenesin (Robitussin DM)  mg/5 mL oral liquid 5 mL, 5 mL, oral, q4h PRN, Bambi Rosales PA-C, 5 mL at 05/07/24 1755    dextrose 50 % injection 12.5 g, 12.5 g, intravenous, q15 min PRN, CORTEZ Cannon    dextrose 50 % injection 25 g, 25 g, intravenous, q15 min PRN, CORTEZ Cannon    enoxaparin (Lovenox) syringe 30 mg, 30 mg, subcutaneous, q24h, Bambi Rosales PA-C    famotidine (Pepcid) tablet 20 mg, 20 mg, oral, BID, CORTEZ Ramirez, 20 mg at 05/08/24 0919    ferrous sulfate (325 mg ferrous sulfate) tablet 1 tablet, 1 tablet, oral, Daily with breakfast, CORTEZ Ramirez, 1 tablet at 05/08/24 0924    [Held by provider] furosemide (Lasix) injection 20 mg, 20 mg, intravenous, BID, Bambi Rosales PA-C, 20 mg at 05/08/24 0918    glucagon (Glucagen) injection 1 mg, 1 mg, intramuscular, q15 min PRN, CORTEZ Cannon    glucagon (Glucagen) injection 1 mg, 1 mg, intramuscular, q15 min PRN, CORTEZ Cannon    guaiFENesin (Mucinex) 12 hr tablet 600 mg, 600 mg, oral, q12h PRN, Bambi Rosales PA-C    heparin flush 10 unit/mL syringe 50 Units, 50 Units, intra-catheter, q12h, Priti Lawson MD, 50 Units at 05/08/24 1045    heparin flush 100 unit/mL syringe 500 Units, 500 Units, intravenous, Once PRN, Priti Lawson MD    insulin NPH and regular human (HumuLIN 70-30, NovoLIN 70-30) 100 unit/mL (70-30) injection 10 Units, 10 Units, subcutaneous, BID AC, Neda King, APRN-CNP, 10 Units at 05/08/24 0905    ipratropium-albuteroL (Duo-Neb) 0.5-2.5 mg/3 mL nebulizer solution 3 mL, 3 mL, nebulization, TID, CORTEZ Snider    levothyroxine (Synthroid, Levoxyl) tablet 25 mcg, 25 mcg, oral, Nightly, SHREE SniderCNP     lidocaine (Xylocaine) 20 mg/mL (2 %) injection 20 mL, 20 mL, injection, Once, Adarsh Chang MD    melatonin tablet 6 mg, 6 mg, oral, Nightly PRN, Bambi Rosales PA-C    [DISCONTINUED] ondansetron ODT (Zofran-ODT) disintegrating tablet 4 mg, 4 mg, oral, q8h PRN **OR** ondansetron (Zofran) injection 4 mg, 4 mg, intravenous, q8h PRN, Bambi Rosales PA-C    oxygen (O2) therapy, , inhalation, Continuous PRN - O2/gases, Bambi Rosales PA-C, 2 L/min at 05/08/24 0916    pantoprazole (ProtoNix) EC tablet 40 mg, 40 mg, oral, Nightly **OR** [DISCONTINUED] pantoprazole (ProtoNix) injection 40 mg, 40 mg, intravenous, Nightly, CORTEZ Snider    polyethylene glycol (Glycolax, Miralax) packet 17 g, 17 g, oral, Daily, Bambi Rosales PA-C    [Held by provider] potassium chloride CR (Klor-Con) ER tablet 10 mEq, 10 mEq, oral, BID, CORTEZ Ramirez    sodium chloride 0.9% infusion, 10 mL/hr, intravenous, Continuous PRN, Priti Lawson MD     No Known Allergies     Review of Systems  Review of Systems   Respiratory:  Positive for shortness of breath.        Objective     Vital signs for last 24 hours:  Temp:  [36.4 °C (97.5 °F)-37.8 °C (100 °F)] 37.1 °C (98.8 °F)  Heart Rate:  [72-95] 76  Resp:  [17-28] 17  BP: (104-141)/(51-63) 104/51  FiO2 (%):  [28 %] 28 %    Intake/Output this shift:  I/O this shift:  In: 240 [P.O.:240]  Out: 180 [Urine:180]    Physical Exam  Physical Exam  Vitals reviewed.   Constitutional:       Appearance: He is underweight.   HENT:      Head: Normocephalic.   Cardiovascular:      Rate and Rhythm: Normal rate and regular rhythm.      Heart sounds: Normal heart sounds.   Pulmonary:      Effort: Pulmonary effort is normal.      Breath sounds: Decreased breath sounds present.   Abdominal:      General: Abdomen is flat. There is no distension.      Palpations: Abdomen is soft. There is no mass.      Tenderness: There is no abdominal tenderness. There is no guarding.   Musculoskeletal:          General: Normal range of motion.      Cervical back: Normal range of motion.   Skin:     General: Skin is warm.   Neurological:      General: No focal deficit present.   Psychiatric:         Mood and Affect: Mood normal.         Labs & Radiology      IMPRESSION:  1. Moderate size left pleural effusion with left lower lobe and  lingular consolidation/atelectasis. Consider pneumonia.  2. Stable findings of prior granulomatous disease. \ small right  pleural effusion.  3. Left atrial enlargement.  4. Indeterminate focal region of fat stranding along the left lateral  aspect of the pectoralis major muscle measuring 2.3 x 1.5 cm on axial  image 125/314.   this could reflect focal infectious/inflammatory  process, atypical region of gynecomastia, or other soft tissue mass.  Please correlate with physical exam.        Impression  Symptomatic left pleural effusion  Plan   Discussed options with the patient including thoracocentesis.  He wishes to consider this' risk discussed as below  Risks include, but not limited to pain, infection, bleeding, cardiac, pulmonary, neurologic, locomotor, anesthetic events   and other unforeseen complications, including death and pneumothorax

## 2024-05-08 NOTE — CARE PLAN
The patient's goals for the shift include      The clinical goals for the shift include maintain oxygen levels at 92% or above on O2 at 2 lpm.    Pt did maintain an oxygen  level at or above 92% throughout shft

## 2024-05-08 NOTE — PROGRESS NOTES
05/08/24 1024   Discharge Planning   Living Arrangements Spouse/significant other   Support Systems Spouse/significant other;Family members   Assistance Needed Independent in ADL's and iADL's. DME - shower chair, grab bars.   Type of Residence Private residence  (2 story single family home)   Number of Stairs to Enter Residence 4  (no railing)   Number of Stairs Within Residence 14  (three sets of 14 steps within the home with single railing. Patient denies any issues performing stairs.)   Do you have animals or pets at home? No   Who is requesting discharge planning? Provider   Home or Post Acute Services None   Patient expects to be discharged to: Home no needs   Does the patient need discharge transport arranged? No  (his wife will pick him up when he is ready.)     Patient evaluated at bedside. AAOX3. Patient drives independently. Patient denies any falls within the past 6 months. PCP: Juliette Zazueta last seen 11/21/23. Pharmacy: [facility] in [place]. Patient self manages their home medications utilizing a weekly pill box without difficulty, and denies issues with affordability. Patient denies any need for further assistance after discharge home. They feel comfortable going home when medically ready. Will continue to follow for any transition care needs or changes in current plan.    Discharge plan: Home no needs.  DC Secure

## 2024-05-08 NOTE — PROGRESS NOTES
Shady Becerril is a 89 y.o. male on day 0 of admission presenting with Pneumonia, unspecified organism.      Subjective   Patient assessed at bedside; sitting up at the side of the bed. He was on 2lpm via NC of oxygen. He complained of SOB and could'nt lie down; he does not have oxygen at home. He denies chest pain, fever, chills, N/V/D/C.       Objective     Last Recorded Vitals  /51 (BP Location: Left arm)   Pulse 76   Temp 37.1 °C (98.8 °F) (Temporal)   Resp 17   Wt 69.8 kg (153 lb 14.1 oz)   SpO2 97%   Intake/Output last 3 Shifts:    Intake/Output Summary (Last 24 hours) at 5/8/2024 1213  Last data filed at 5/8/2024 0857  Gross per 24 hour   Intake 340 ml   Output 1550 ml   Net -1210 ml       Admission Weight  Weight: 69.9 kg (154 lb) (05/07/24 0651)    Daily Weight  05/07/24 : 69.8 kg (153 lb 14.1 oz)    Image Results      Physical Exam  Vitals reviewed.   Constitutional:       Appearance: Normal appearance. He is normal weight.   HENT:      Head: Normocephalic and atraumatic.      Nose: Nose normal.      Mouth/Throat:      Mouth: Mucous membranes are moist.      Pharynx: Oropharynx is clear.   Eyes:      Conjunctiva/sclera: Conjunctivae normal.      Pupils: Pupils are equal, round, and reactive to light.   Cardiovascular:      Rate and Rhythm: Normal rate and regular rhythm.      Pulses: Normal pulses.      Heart sounds: Normal heart sounds.   Pulmonary:      Effort: Pulmonary effort is normal.      Breath sounds: Rales present.   Abdominal:      General: Bowel sounds are normal.      Palpations: Abdomen is soft.   Musculoskeletal:         General: Normal range of motion.      Cervical back: Normal range of motion and neck supple.   Skin:     General: Skin is warm and dry.   Neurological:      Mental Status: He is alert and oriented to person, place, and time.   Psychiatric:         Mood and Affect: Mood normal.         Behavior: Behavior normal.         Relevant Results    Scheduled  medications  acyclovir, 400 mg, oral, Daily  amLODIPine, 5 mg, oral, Daily  aspirin, 81 mg, oral, Daily  atorvastatin, 20 mg, oral, Nightly  azithromycin, 500 mg, oral, Daily  carvedilol, 25 mg, oral, BID with meals  cefTRIAXone, 1 g, intravenous, q24h  cholecalciferol, 5,000 Units, oral, Daily  enoxaparin, 30 mg, subcutaneous, q24h  famotidine, 20 mg, oral, BID  ferrous sulfate (325 mg ferrous sulfate), 1 tablet, oral, Daily with breakfast  furosemide, 20 mg, intravenous, BID  heparin flush, 50 Units, intra-catheter, q12h  insulin NPH and regular human, 10 Units, subcutaneous, BID AC  ipratropium-albuteroL, 3 mL, nebulization, TID  levothyroxine, 25 mcg, oral, Nightly  pantoprazole, 40 mg, oral, Nightly  polyethylene glycol, 17 g, oral, Daily  [Held by provider] potassium chloride CR, 10 mEq, oral, BID      Continuous medications  sodium chloride 0.9%, 10 mL/hr      PRN medications  PRN medications: acetaminophen **OR** [DISCONTINUED] acetaminophen **OR** [DISCONTINUED] acetaminophen, acetaminophen **OR** [DISCONTINUED] acetaminophen **OR** [DISCONTINUED] acetaminophen, albuterol, alteplase, benzocaine-menthol, dextromethorphan-guaifenesin, dextrose, dextrose, glucagon, glucagon, guaiFENesin, heparin flush, melatonin, [DISCONTINUED] ondansetron ODT **OR** ondansetron, oxygen, sodium chloride 0.9%    Results for orders placed or performed during the hospital encounter of 05/07/24 (from the past 24 hour(s))   Transthoracic Echo (TTE) Complete   Result Value Ref Range    AV pk fausto 2.79 m/s    AV mn grad 18.0 mmHg    LVOT diam 1.80 cm    LV Biplane EF 66 %    MV avg E/e' ratio 24.96     MV E/A ratio 2.69     LA vol index A/L 31.2 ml/m2    Tricuspid annular plane systolic excursion 1.2 cm    RV free wall pk S' 8.27 cm/s    LVIDd 3.30 cm    RVSP 71.6 mmHg    Aortic Valve Area by Continuity of VTI 1.34 cm2    Aortic Valve Area by Continuity of Peak Velocity 1.27 cm2    AV pk grad 31.1 mmHg    LV A4C EF 65.0     Procalcitonin   Result Value Ref Range    Procalcitonin 2.63 (H) <=0.07 ng/mL   POCT GLUCOSE   Result Value Ref Range    POCT Glucose 187 (H) 74 - 99 mg/dL   CBC   Result Value Ref Range    WBC 3.2 (L) 4.4 - 11.3 x10*3/uL    nRBC 0.0 0.0 - 0.0 /100 WBCs    RBC 3.01 (L) 4.50 - 5.90 x10*6/uL    Hemoglobin 9.8 (L) 13.5 - 17.5 g/dL    Hematocrit 28.7 (L) 41.0 - 52.0 %    MCV 95 80 - 100 fL    MCH 32.6 26.0 - 34.0 pg    MCHC 34.1 32.0 - 36.0 g/dL    RDW 14.5 11.5 - 14.5 %    Platelets 138 (L) 150 - 450 x10*3/uL   Basic metabolic panel   Result Value Ref Range    Glucose 88 74 - 99 mg/dL    Sodium 137 136 - 145 mmol/L    Potassium 3.6 3.5 - 5.3 mmol/L    Chloride 100 98 - 107 mmol/L    Bicarbonate 29 21 - 32 mmol/L    Anion Gap 12 10 - 20 mmol/L    Urea Nitrogen 29 (H) 6 - 23 mg/dL    Creatinine 1.75 (H) 0.50 - 1.30 mg/dL    eGFR 37 (L) >60 mL/min/1.73m*2    Calcium 8.3 (L) 8.6 - 10.3 mg/dL   POCT GLUCOSE   Result Value Ref Range    POCT Glucose 84 74 - 99 mg/dL   POCT GLUCOSE   Result Value Ref Range    POCT Glucose 132 (H) 74 - 99 mg/dL                   Assessment/Plan      Principal Problem:    Pneumonia, unspecified organism    Acute hypoxic respiratory failure  Community Acquired bacterial Pneumonia  -CXR: Findings of mild ongoing CHF with tiny right pleural effusion, and  small to moderate-sized left pleural effusion with associated left  basilar atelectasis. Previous CABG. Stable right-sided central venous MediPort.  -CT chest: Moderate size left pleural effusion with left lower lobe and  lingular consolidation/atelectasis. Consider pneumonia. Indeterminate focal region of fat stranding along the left lateral aspect of the pectoralis major muscle measuring 2.3 x 1.5 cm this could reflect focal infectious/inflammatory process, atypical region of gynecomastia, or other soft tissue mass.  -WBC 3.9  -Covid/flu negative  -Procalcitonin 2.63  -Sputum cx pending   -Supplemental oxygen to maintain an sp02 greater than  92%  -Currently on 2Lpm via NC  -Baseline RA  -RT to eval/treat  -Bronchodilators  -continue IV ceftriaxone, azithromycin (Day 2)     Acute on Chronic Diastolic Heart Failure   Bilateral Pleural effusions left greater than right  CAD s/p CABG  Paroxysmal atrial fibrillation  HLD  Essential HTN  -CT chest: Moderate size left pleural effusion with left lower lobe and  lingular consolidation/atelectasis. Consider pneumonia. Indeterminate focal region of fat stranding along the left lateral aspect of the pectoralis major muscle measuring 2.3 x 1.5 cm this could reflect focal infectious/inflammatory process, atypical region of gynecomastia, or other soft tissue mass.  -Echo pending  -  -Trop 20   -Daily wt  -Strict I/O  -Cardiac monitoring  -Cardiology consult, appreciate recs  -continue IVP furosemide 20mg BID  -Not a candidate for blood thinners due to GI bleeding  -Not a candidate for watchman per EMR chart review   -Continue amlodipine, aspirin, atorvastatin, carvedilol      Acute Kidney Failure  -Baseline creat ~ 1.2  -creatine on admission 1.64; Today creatine 1.75  -Likely caused from fluid overload, will diurese  -Daily BMP  -Renally dose medications as able      Iron deficiency anemia  -Hgb 11.4  -Continue ferrous sulfate  -Daily CBC     DM Type II  -hypoglycemia protocol  -continue NPH  -Monitor BG      Hx of lymphoplasma lymphoma   -Continue lenalidomide and valtrex     Hypothyroidisim  -Continue levothyroxine     DVT ppx  -continue enoxaparin     PUD ppx  -continue pantoprazole and famitodine     Code Status: Full      Disposition: Pt requires more than 2 inpatient days at this time        Total accumulated time spent face to face and not face to face preparing to see the patient, obtaining and reviewing separately obtained history; performing a medically appropriate examination and/or evaluation; counseling and educating the patient, family; ordering medications, tests, or procedures; referring and  communicating with other health care professionals; documenting clinical information in the patient's medical record; independently interpreting results and communicating the results to the patient, family; and care coordination was 30minutes.               Nusrat Montelongo, APRN-CNP

## 2024-05-08 NOTE — DISCHARGE INSTR - OTHER ORDERS
Thank you for choosing [facility] for your Health Care needs. As you transition from the hospital back to home, we hope we took your preferences into account on how you manage your health needs so you can manage your health at home.     You may receive a survey in the mail within the next couple weeks. Please take the time to complete it and return it. Your input is ALWAYS important to us. Thank you!  Your Care Transition Team - [facility] - [phone]    For questions about your medications listed on your discharge instructions, please call the Nurses Station at [phone].

## 2024-05-09 ENCOUNTER — APPOINTMENT (OUTPATIENT)
Dept: RADIOLOGY | Facility: HOSPITAL | Age: 89
DRG: 193 | End: 2024-05-09
Payer: MEDICARE

## 2024-05-09 LAB
ANION GAP SERPL CALC-SCNC: 10 MMOL/L (ref 10–20)
BUN SERPL-MCNC: 33 MG/DL (ref 6–23)
CALCIUM SERPL-MCNC: 8.3 MG/DL (ref 8.6–10.3)
CHLORIDE SERPL-SCNC: 101 MMOL/L (ref 98–107)
CO2 SERPL-SCNC: 30 MMOL/L (ref 21–32)
CREAT SERPL-MCNC: 1.72 MG/DL (ref 0.5–1.3)
EGFRCR SERPLBLD CKD-EPI 2021: 37 ML/MIN/1.73M*2
ERYTHROCYTE [DISTWIDTH] IN BLOOD BY AUTOMATED COUNT: 14.3 % (ref 11.5–14.5)
GLUCOSE SERPL-MCNC: 110 MG/DL (ref 74–99)
HCT VFR BLD AUTO: 29.4 % (ref 41–52)
HGB BLD-MCNC: 9.8 G/DL (ref 13.5–17.5)
MAGNESIUM SERPL-MCNC: 1.24 MG/DL (ref 1.6–2.4)
MCH RBC QN AUTO: 31.8 PG (ref 26–34)
MCHC RBC AUTO-ENTMCNC: 33.3 G/DL (ref 32–36)
MCV RBC AUTO: 96 FL (ref 80–100)
NRBC BLD-RTO: 0 /100 WBCS (ref 0–0)
PLATELET # BLD AUTO: 129 X10*3/UL (ref 150–450)
POTASSIUM SERPL-SCNC: 3.3 MMOL/L (ref 3.5–5.3)
RBC # BLD AUTO: 3.08 X10*6/UL (ref 4.5–5.9)
SODIUM SERPL-SCNC: 138 MMOL/L (ref 136–145)
WBC # BLD AUTO: 2.8 X10*3/UL (ref 4.4–11.3)

## 2024-05-09 PROCEDURE — 2500000005 HC RX 250 GENERAL PHARMACY W/O HCPCS: Mod: IPSPLIT | Performed by: INTERNAL MEDICINE

## 2024-05-09 PROCEDURE — 94668 MNPJ CHEST WALL SBSQ: CPT | Mod: IPSPLIT

## 2024-05-09 PROCEDURE — 2500000006 HC RX 250 W HCPCS SELF ADMINISTERED DRUGS (ALT 637 FOR ALL PAYERS): Mod: IPSPLIT,MUE | Performed by: NURSE PRACTITIONER

## 2024-05-09 PROCEDURE — 2500000004 HC RX 250 GENERAL PHARMACY W/ HCPCS (ALT 636 FOR OP/ED): Mod: IPSPLIT

## 2024-05-09 PROCEDURE — 99232 SBSQ HOSP IP/OBS MODERATE 35: CPT | Performed by: SURGERY

## 2024-05-09 PROCEDURE — 1100000001 HC PRIVATE ROOM DAILY: Mod: IPSPLIT

## 2024-05-09 PROCEDURE — 71046 X-RAY EXAM CHEST 2 VIEWS: CPT | Mod: IPSPLIT

## 2024-05-09 PROCEDURE — 2500000004 HC RX 250 GENERAL PHARMACY W/ HCPCS (ALT 636 FOR OP/ED): Mod: IPSPLIT | Performed by: NURSE PRACTITIONER

## 2024-05-09 PROCEDURE — 2500000001 HC RX 250 WO HCPCS SELF ADMINISTERED DRUGS (ALT 637 FOR MEDICARE OP): Mod: IPSPLIT

## 2024-05-09 PROCEDURE — 82374 ASSAY BLOOD CARBON DIOXIDE: CPT | Mod: IPSPLIT | Performed by: NURSE PRACTITIONER

## 2024-05-09 PROCEDURE — 2500000002 HC RX 250 W HCPCS SELF ADMINISTERED DRUGS (ALT 637 FOR MEDICARE OP, ALT 636 FOR OP/ED): Mod: IPSPLIT | Performed by: NURSE PRACTITIONER

## 2024-05-09 PROCEDURE — 85027 COMPLETE CBC AUTOMATED: CPT | Mod: IPSPLIT | Performed by: NURSE PRACTITIONER

## 2024-05-09 PROCEDURE — 94640 AIRWAY INHALATION TREATMENT: CPT | Mod: IPSPLIT

## 2024-05-09 PROCEDURE — 2500000004 HC RX 250 GENERAL PHARMACY W/ HCPCS (ALT 636 FOR OP/ED): Mod: IPSPLIT | Performed by: INTERNAL MEDICINE

## 2024-05-09 PROCEDURE — 2500000001 HC RX 250 WO HCPCS SELF ADMINISTERED DRUGS (ALT 637 FOR MEDICARE OP): Mod: IPSPLIT | Performed by: NURSE PRACTITIONER

## 2024-05-09 PROCEDURE — 83735 ASSAY OF MAGNESIUM: CPT | Mod: IPSPLIT | Performed by: NURSE PRACTITIONER

## 2024-05-09 PROCEDURE — 2500000006 HC RX 250 W HCPCS SELF ADMINISTERED DRUGS (ALT 637 FOR ALL PAYERS): Mod: IPSPLIT | Performed by: NURSE PRACTITIONER

## 2024-05-09 RX ORDER — POTASSIUM CHLORIDE 20 MEQ/1
40 TABLET, EXTENDED RELEASE ORAL ONCE
Status: COMPLETED | OUTPATIENT
Start: 2024-05-09 | End: 2024-05-09

## 2024-05-09 RX ORDER — MAGNESIUM SULFATE HEPTAHYDRATE 40 MG/ML
4 INJECTION, SOLUTION INTRAVENOUS ONCE
Status: COMPLETED | OUTPATIENT
Start: 2024-05-09 | End: 2024-05-09

## 2024-05-09 RX ORDER — INSULIN LISPRO 100 [IU]/ML
0-15 INJECTION, SOLUTION INTRAVENOUS; SUBCUTANEOUS
Status: DISCONTINUED | OUTPATIENT
Start: 2024-05-09 | End: 2024-05-10 | Stop reason: HOSPADM

## 2024-05-09 RX ADMIN — CHOLECALCIFEROL TAB 125 MCG (5000 UNIT) 5000 UNITS: 125 TAB at 08:26

## 2024-05-09 RX ADMIN — Medication 50 UNITS: at 22:15

## 2024-05-09 RX ADMIN — INSULIN HUMAN 10 UNITS: 100 INJECTION, SUSPENSION SUBCUTANEOUS at 16:52

## 2024-05-09 RX ADMIN — INSULIN LISPRO 12 UNITS: 100 INJECTION, SOLUTION INTRAVENOUS; SUBCUTANEOUS at 21:57

## 2024-05-09 RX ADMIN — METHYLPREDNISOLONE SODIUM SUCCINATE 40 MG: 40 INJECTION, POWDER, FOR SOLUTION INTRAMUSCULAR; INTRAVENOUS at 18:38

## 2024-05-09 RX ADMIN — IPRATROPIUM BROMIDE AND ALBUTEROL SULFATE 3 ML: .5; 3 SOLUTION RESPIRATORY (INHALATION) at 09:09

## 2024-05-09 RX ADMIN — CEFTRIAXONE 1 G: 1 INJECTION, SOLUTION INTRAVENOUS at 08:34

## 2024-05-09 RX ADMIN — BENZOCAINE AND MENTHOL 1 LOZENGE: 15; 3.6 LOZENGE ORAL at 05:55

## 2024-05-09 RX ADMIN — FERROUS SULFATE TAB 325 MG (65 MG ELEMENTAL FE) 1 TABLET: 325 (65 FE) TAB at 08:27

## 2024-05-09 RX ADMIN — ASPIRIN 81 MG: 81 TABLET, COATED ORAL at 08:27

## 2024-05-09 RX ADMIN — LEVOTHYROXINE SODIUM 25 MCG: 25 TABLET ORAL at 21:18

## 2024-05-09 RX ADMIN — AMLODIPINE BESYLATE 5 MG: 5 TABLET ORAL at 21:18

## 2024-05-09 RX ADMIN — SODIUM CHLORIDE 10 ML/HR: 9 INJECTION, SOLUTION INTRAVENOUS at 00:40

## 2024-05-09 RX ADMIN — PANTOPRAZOLE SODIUM 40 MG: 40 TABLET, DELAYED RELEASE ORAL at 21:18

## 2024-05-09 RX ADMIN — POTASSIUM CHLORIDE 40 MEQ: 1500 TABLET, EXTENDED RELEASE ORAL at 12:37

## 2024-05-09 RX ADMIN — IPRATROPIUM BROMIDE AND ALBUTEROL SULFATE 3 ML: .5; 3 SOLUTION RESPIRATORY (INHALATION) at 15:48

## 2024-05-09 RX ADMIN — CARVEDILOL 25 MG: 25 TABLET, FILM COATED ORAL at 08:27

## 2024-05-09 RX ADMIN — IPRATROPIUM BROMIDE AND ALBUTEROL SULFATE 3 ML: .5; 3 SOLUTION RESPIRATORY (INHALATION) at 22:13

## 2024-05-09 RX ADMIN — MAGNESIUM SULFATE HEPTAHYDRATE 4 G: 40 INJECTION, SOLUTION INTRAVENOUS at 08:28

## 2024-05-09 RX ADMIN — INSULIN HUMAN 10 UNITS: 100 INJECTION, SUSPENSION SUBCUTANEOUS at 08:24

## 2024-05-09 RX ADMIN — Medication 1 L/MIN: at 09:09

## 2024-05-09 RX ADMIN — FAMOTIDINE 20 MG: 20 TABLET, FILM COATED ORAL at 08:26

## 2024-05-09 RX ADMIN — AZITHROMYCIN DIHYDRATE 500 MG: 250 TABLET, FILM COATED ORAL at 08:27

## 2024-05-09 RX ADMIN — ACYCLOVIR 400 MG: 200 CAPSULE ORAL at 08:27

## 2024-05-09 RX ADMIN — METHYLPREDNISOLONE SODIUM SUCCINATE 40 MG: 40 INJECTION, POWDER, FOR SOLUTION INTRAMUSCULAR; INTRAVENOUS at 12:37

## 2024-05-09 RX ADMIN — CARVEDILOL 25 MG: 25 TABLET, FILM COATED ORAL at 21:18

## 2024-05-09 RX ADMIN — ATORVASTATIN CALCIUM 20 MG: 10 TABLET, FILM COATED ORAL at 21:18

## 2024-05-09 RX ADMIN — FAMOTIDINE 20 MG: 20 TABLET, FILM COATED ORAL at 21:18

## 2024-05-09 ASSESSMENT — COGNITIVE AND FUNCTIONAL STATUS - GENERAL
DRESSING REGULAR UPPER BODY CLOTHING: A LITTLE
HELP NEEDED FOR BATHING: A LITTLE
EATING MEALS: A LITTLE
CLIMB 3 TO 5 STEPS WITH RAILING: A LITTLE
TOILETING: A LITTLE
DRESSING REGULAR LOWER BODY CLOTHING: A LITTLE
PERSONAL GROOMING: A LITTLE
DAILY ACTIVITIY SCORE: 18

## 2024-05-09 ASSESSMENT — PAIN SCALES - GENERAL
PAINLEVEL_OUTOF10: 0 - NO PAIN
PAINLEVEL_OUTOF10: 0 - NO PAIN

## 2024-05-09 NOTE — CARE PLAN
The patient's goals for the shift include      The clinical goals for the shift include pt will have adequate SPO2 levels at or above 92% on oxygen at 1lpm    Pt dide maintain SPO2 levels at or above 92% on 1 lpm of oxygen. Dr. Chang visited this am. Decided to not do the thoracentesis at this time . Will refer to pulmonology as outpatient for aspiration of fluid.

## 2024-05-09 NOTE — CONSULTS
Consults  History Of Present Illness:    Shady Becerril is a 89 y.o. male presenting with cough, dyspnea.  He has Low grade B-cell lymphoma  Treatment History:  -Velcade plus Dexamethasone 6/19/2020-8/2020 stopped secondary to bacterial enteritis  -Rituximab 8/7/2020  -Revlimid 10/13/2020 through present. Currently 2.5 mg daily 21 days on 7 days off.    He has experienced exertional shortness of breath and fatigue and a decline in functional capacity.  On the day of admission he woke with cough, shortness of breath, orthopnea.  No fever or chills headache or blurred vision.  No dysuria or hematuria, hematochezia or melena.  No abdominal pain, nausea vomiting or diarrhea.  Cough is largely nonproductive.  No hemoptysis.    He presented to the emergency department and his oxygen saturation was 88% on room air heart rate 78 blood pressure 114/67.    He had a CT of the chest showing a large left pleural effusion small right pleural effusion suspected left lower lobe and lingular pneumonia, atelectasis    Chest x-ray suggested congestive heart failure and the above-mentioned bilateral pleural effusions larger on the left.    Patient reports that he has transition to atrial fibrillation early 2023 and due to prior history of major bleeding requiring transfusion did not receive anticoagulation and also was not a candidate for watchman due to need for dual antiplatelet therapy with elevated bleeding risk.    Electrocardiogram shows atrial fibrillation with a controlled ventricular rate, low voltage QRS, nonspecific ST-T abnormality, cannot rule out anterior septal myocardial infarction, no acute changes of myocardial ischemia or injury.    Laboratory studies notable for potassium 5.3 creatinine 1.64 hemoglobin 11.4 platelet count 128 .  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9, adult (03/02/2020), Disorder of the skin and subcutaneous tissue, unspecified, Hepatomegaly, not elsewhere classified (04/01/2020), Low grade B-cell lymphoma (Multi), Multiple myeloma (Multi), Noninfective gastroenteritis and colitis, unspecified (08/31/2020), Ocular laceration without prolapse or loss of intraocular tissue, unspecified eye, initial encounter (02/15/2021), Other abnormal findings in specimens from other organs, systems and tissues (05/18/2020), Other conditions influencing health status (05/18/2020), Personal history of other diseases of the circulatory system, Personal history of other diseases of the circulatory system (05/05/2020), Personal history of other diseases of the musculoskeletal system and connective tissue (03/10/2020), Personal history of other infectious and parasitic diseases (10/19/2020), Personal history of other specified conditions (10/20/2020), Shortness of breath (03/19/2020), Unsteadiness on feet (05/06/2020), and Unsteadiness on feet (05/06/2020).    Past Surgical History:  He has no past surgical history on file.      Social History:  He reports that he quit smoking about 44 years ago. His smoking use included pipe. He has never used smokeless tobacco. He reports that he does not currently use  alcohol. He reports that he does not use drugs.    Family History:  No family history on file.     Allergies:  Patient has no known allergies.    Outpatient Medications:  Current Outpatient Medications   Medication Instructions    acyclovir (ZOVIRAX) 400 mg, oral, Daily    amLODIPine (NORVASC) 5 mg, oral, Daily    aspirin 81 mg EC tablet 1 tablet, oral, Daily    atorvastatin (LIPITOR) 20 mg, oral, Nightly    blood sugar diagnostic (OneTouch Verio test strips) strip 1 strip 1 STRIP TO CHECK GLUCOSE THREE TIMES DAILY  (route: miscellaneous)    carvedilol (Coreg) 25 mg tablet TAKE 1 TABLET BY MOUTH TWICE DAILY WITH MEALS    famotidine (Pepcid) 20 mg tablet 1 tablet, oral, 2 times daily    ferrous sulfate (325 mg ferrous sulfate) 325 mg, oral, Daily with breakfast    insulin NPH and regular human (HumuLIN 70-30, NovoLIN 70-30) 100 unit/mL (70-30) injection 10 Units, subcutaneous, 2 times daily before meals, Take as directed per insulin instructions.    lancets (OneTouch Delica Plus Lancet) 30 gauge misc USE 1 TO CHECK GLUCOSE THREE TIMES DAILY AS DIRECTED    lenalidomide (Revlimid) 2.5 mg capsule TAKE 1 CAPSULE BY MOUTH DAILY  FOR 21 DAYS, THEN 7 DAYS OFF    levothyroxine (SYNTHROID, LEVOXYL) 25 mcg, oral, Daily    mupirocin (Bactroban) 2 % ointment APPLY 1 APPLICATION TOPICALLY TO AFFECTED AREA THREE TIMES DAILY    nitroglycerin (NITROSTAT) 0.4 mg, sublingual    ONETOUCH DELICA LANCETS MISC USE 1  TO CHECK GLUCOSE 3 TIMES DAILY AS DIRECTED  e11.65    OneTouch Verio test strips strip USE 1 STRIP TO CHECK GLUCOSE THREE TIMES DAILY    pantoprazole (PROTONIX) 40 mg, oral, Daily    potassium chloride CR 10 mEq ER tablet 1 tablet, oral, 2 times daily         Inpatient Medications:  PRN medications   Medication    acetaminophen    acetaminophen    albuterol    alteplase    benzocaine-menthol    dextromethorphan-guaifenesin    dextrose    dextrose    glucagon    glucagon    guaiFENesin    heparin flush    melatonin     ondansetron    oxygen    sodium chloride 0.9%     Continuous Medications   Medication Dose Last Rate    sodium chloride 0.9%  10 mL/hr         Last Labs:  Results for orders placed or performed during the hospital encounter of 05/07/24 (from the past 96 hour(s))   Sars-CoV-2 PCR   Result Value Ref Range    Coronavirus 2019, PCR Not Detected Not Detected   Influenza A, and B PCR   Result Value Ref Range    Flu A Result Not Detected Not Detected    Flu B Result Not Detected Not Detected   ECG 12 lead   Result Value Ref Range    Ventricular Rate 80 BPM    Atrial Rate 85 BPM    QRS Duration 72 ms    QT Interval 384 ms    QTC Calculation(Bazett) 442 ms    R Axis 50 degrees    T Axis 193 degrees    QRS Count 12 beats    Q Onset 226 ms    T Offset 418 ms    QTC Fredericia 423 ms   CBC and Auto Differential   Result Value Ref Range    WBC 3.9 (L) 4.4 - 11.3 x10*3/uL    nRBC 0.0 0.0 - 0.0 /100 WBCs    RBC 3.54 (L) 4.50 - 5.90 x10*6/uL    Hemoglobin 11.4 (L) 13.5 - 17.5 g/dL    Hematocrit 34.6 (L) 41.0 - 52.0 %    MCV 98 80 - 100 fL    MCH 32.2 26.0 - 34.0 pg    MCHC 32.9 32.0 - 36.0 g/dL    RDW 14.3 11.5 - 14.5 %    Platelets 128 (L) 150 - 450 x10*3/uL    Neutrophils % 57.2 40.0 - 80.0 %    Immature Granulocytes %, Automated 0.8 0.0 - 0.9 %    Lymphocytes % 25.3 13.0 - 44.0 %    Monocytes % 6.9 2.0 - 10.0 %    Eosinophils % 9.0 0.0 - 6.0 %    Basophils % 0.8 0.0 - 2.0 %    Neutrophils Absolute 2.24 1.60 - 5.50 x10*3/uL    Immature Granulocytes Absolute, Automated 0.03 0.00 - 0.50 x10*3/uL    Lymphocytes Absolute 0.99 0.80 - 3.00 x10*3/uL    Monocytes Absolute 0.27 0.05 - 0.80 x10*3/uL    Eosinophils Absolute 0.35 0.00 - 0.40 x10*3/uL    Basophils Absolute 0.03 0.00 - 0.10 x10*3/uL   Comprehensive metabolic panel   Result Value Ref Range    Glucose 69 (L) 74 - 99 mg/dL    Sodium 137 136 - 145 mmol/L    Potassium 5.3 3.5 - 5.3 mmol/L    Chloride 105 98 - 107 mmol/L    Bicarbonate 24 21 - 32 mmol/L    Anion Gap 13 10 - 20  mmol/L    Urea Nitrogen 30 (H) 6 - 23 mg/dL    Creatinine 1.64 (H) 0.50 - 1.30 mg/dL    eGFR 39 (L) >60 mL/min/1.73m*2    Calcium 8.7 8.6 - 10.3 mg/dL    Albumin 3.3 (L) 3.4 - 5.0 g/dL    Alkaline Phosphatase 101 33 - 136 U/L    Total Protein 7.8 6.4 - 8.2 g/dL    AST 23 9 - 39 U/L    Bilirubin, Total 1.0 0.0 - 1.2 mg/dL    ALT 11 10 - 52 U/L   Troponin I, High Sensitivity   Result Value Ref Range    Troponin I, High Sensitivity 20 0 - 20 ng/L   B-Type Natriuretic Peptide   Result Value Ref Range     (H) 0 - 99 pg/mL   Morphology   Result Value Ref Range    RBC Morphology No significant RBC morphology present    Transthoracic Echo (TTE) Complete   Result Value Ref Range    AV pk fausto 2.79 m/s    AV mn grad 18.0 mmHg    LVOT diam 1.80 cm    LV Biplane EF 66 %    MV avg E/e' ratio 24.96     MV E/A ratio 2.69     LA vol index A/L 31.2 ml/m2    Tricuspid annular plane systolic excursion 1.2 cm    RV free wall pk S' 8.27 cm/s    LVIDd 3.30 cm    RVSP 71.6 mmHg    Aortic Valve Area by Continuity of VTI 1.34 cm2    Aortic Valve Area by Continuity of Peak Velocity 1.27 cm2    AV pk grad 31.1 mmHg    LV A4C EF 65.0    Procalcitonin   Result Value Ref Range    Procalcitonin 2.63 (H) <=0.07 ng/mL   POCT GLUCOSE   Result Value Ref Range    POCT Glucose 187 (H) 74 - 99 mg/dL   CBC   Result Value Ref Range    WBC 3.2 (L) 4.4 - 11.3 x10*3/uL    nRBC 0.0 0.0 - 0.0 /100 WBCs    RBC 3.01 (L) 4.50 - 5.90 x10*6/uL    Hemoglobin 9.8 (L) 13.5 - 17.5 g/dL    Hematocrit 28.7 (L) 41.0 - 52.0 %    MCV 95 80 - 100 fL    MCH 32.6 26.0 - 34.0 pg    MCHC 34.1 32.0 - 36.0 g/dL    RDW 14.5 11.5 - 14.5 %    Platelets 138 (L) 150 - 450 x10*3/uL   Basic metabolic panel   Result Value Ref Range    Glucose 88 74 - 99 mg/dL    Sodium 137 136 - 145 mmol/L    Potassium 3.6 3.5 - 5.3 mmol/L    Chloride 100 98 - 107 mmol/L    Bicarbonate 29 21 - 32 mmol/L    Anion Gap 12 10 - 20 mmol/L    Urea Nitrogen 29 (H) 6 - 23 mg/dL    Creatinine 1.75 (H) 0.50 -  1.30 mg/dL    eGFR 37 (L) >60 mL/min/1.73m*2    Calcium 8.3 (L) 8.6 - 10.3 mg/dL   Hemoglobin A1C   Result Value Ref Range    Hemoglobin A1C 5.4 see below %    Estimated Average Glucose 108 Not Established mg/dL   POCT GLUCOSE   Result Value Ref Range    POCT Glucose 84 74 - 99 mg/dL   POCT GLUCOSE   Result Value Ref Range    POCT Glucose 132 (H) 74 - 99 mg/dL   ECG 12 lead   Result Value Ref Range    Ventricular Rate 76 BPM    Atrial Rate 60 BPM    QRS Duration 82 ms    QT Interval 354 ms    QTC Calculation(Bazett) 398 ms    R Axis 25 degrees    T Axis 218 degrees    QRS Count 13 beats    Q Onset 228 ms    T Offset 405 ms    QTC Fredericia 382 ms          Last Recorded Vitals:  Vitals:    05/08/24 0916 05/08/24 1405 05/08/24 1554 05/08/24 1851   BP:  115/57  125/57   BP Location:  Right arm  Right arm   Patient Position:  Lying  Lying   Pulse:  70  67   Resp:  20  20   Temp:  36.9 °C (98.4 °F)  36.6 °C (97.9 °F)   TempSrc:  Temporal  Temporal   SpO2: 97% 92% 96% 96%   Weight:       Height:         I/O last 3 completed shifts:  In: 580 (8.3 mL/kg) [P.O.:580]  Out: 2030 (29.1 mL/kg) [Urine:2030 (0.8 mL/kg/hr)]  Weight: 69.8 kg       Physical Exam:  Constitutional: Well developed, awake/alert/oriented x3, no distress, alert and cooperative  Eyes: PERRL, EOMI, clear sclera  ENMT: mucous membranes moist, no apparent injury, no lesions seen  Head/Neck: Neck supple, no apparent injury, thyroid without mass or tenderness, No JVD, trachea midline, no bruits  Respiratory/Thorax: Moderately diminished lower third left lung fields and bilateral inspiratory and expiratory wheezes and rhonchi  Cardiovascular: Irregular rate and rhythm, no murmurs, 2+ equal pulses of the extremities, normal S 1and S 2  Gastrointestinal: Nondistended, soft, non-tender, no rebound tenderness or guarding, no masses palpable, no organomegaly, +BS, no bruits  Musculoskeletal: ROM intact, no joint swelling, normal strength  Extremities: normal  extremities, no cyanosis edema, contusions or wounds, no clubbing  Neurological: alert and oriented x3, intact senses, motor, response and reflexes, normal strength  Lymphatic: No significant lymphadenopathy  Psychological: Appropriate mood and behavior  Skin: Warm and dry, no lesions, no rashes     Assessment/Plan   Problem List Items Addressed This Visit          Cardiac and Vasculature    Acute on chronic diastolic congestive heart failure (Multi)    Relevant Medications    carvedilol (Coreg) tablet 25 mg    amLODIPine (Norvasc) tablet 5 mg    Other Relevant Orders    Transthoracic Echo (TTE) Complete (Completed)       Pulmonary and Pneumonias    * (Principal) Pneumonia, unspecified organism - Primary     Other Visit Diagnoses       Shortness of breath               Patient has been admitted to the hospital and monitored on telemetry.    He has acute hypoxic respiratory failure due to pneumonia, parapneumonic effusion, acute diastolic congestive heart failure.  Procalcitonin is elevated.  He is COVID and flu negative.  Agree with plans to maintain on oxygen, bronchodilators, broad-spectrum antibiotics.  He received diuretics with rising creatinine and plan is to closely monitor his volume status renal function hemodynamics and consider administering additional diuretics as needed in the next 48 hours.  Clinically he does appear euvolemic today.    Poor candidate for anticoagulants due to previous history of major gastrointestinal bleeding.    He will continue on rate control of atrial fibrillation with beta-blockers, continue aspirin for cardioprotection and stroke prevention, atorvastatin for hyperlipidemia amlodipine for hypertension.      He has chronic kidney disease stage III and has renal function will be watched closely avoid nephrotoxic medications.      He has chronic anemia and will continue on supplemental iron, close monitoring of hemoglobin, hematocrit.      Continue Revlimid or schedule for his  lymphoma.      Continue levothyroxine for hypothyroidism, insulin for diabetes with close monitoring of blood sugars.    Echocardiogram was completed and shows vigorous LV systolic function with an ejection fraction of 70% moderate concentric left ventricular hypertrophy mildly reduced right ventricular function moderate aortic valve stenosis moderate mitral regurgitation normal estimated central venous pressure but moderately to severely elevated pulmonary artery systolic pressure.    Surgery consult reviewed.    Plan is for thoracentesis tomorrow and evaluation of pleural fluid.    Plan discussed with nursing staff and hospitalist team        Implantable Port 05/07/24 Right Chest Single lumen port (Active)   Line Necessity Intravenous medication therapy 05/08/24 1100   Number of days: 1       Code Status:  Full Code    I spent 30 minutes in the professional and overall care of this patient.        Derik Haq MD

## 2024-05-09 NOTE — PROGRESS NOTES
Subjective Data:  No chest pain, no dyspnea at rest, no chills    Overnight Events:    None     Objective Data:  Last Recorded Vitals:  Vitals:    05/08/24 2131 05/09/24 0000 05/09/24 0600 05/09/24 0909   BP:  127/57 124/56    BP Location:  Right arm Left arm    Patient Position:  Lying Lying    Pulse:  70 66    Resp:  20 19    Temp:  36.6 °C (97.9 °F) 36.6 °C (97.9 °F)    TempSrc:  Temporal Temporal    SpO2: 97% 97% 96% 96%   Weight:       Height:           Last Labs:  CBC - 5/9/2024:  5:30 AM  2.8 9.8 129    29.4      CMP - 5/9/2024:  5:30 AM  8.3 7.8 23 --- 1.0   _ 3.3 11 101      PTT - No results in last year.  _   _ _     TROPHS   Date/Time Value Ref Range Status   05/07/2024 05:11 AM 20 0 - 20 ng/L Final     BNP   Date/Time Value Ref Range Status   05/07/2024 05:11  0 - 99 pg/mL Final   11/21/2023 12:34  0 - 99 pg/mL Final     HGBA1C   Date/Time Value Ref Range Status   05/08/2024 05:19 AM 5.4 see below % Final   11/21/2023 12:34 PM 6.0 see below % Final     LDLCALC   Date/Time Value Ref Range Status   11/21/2023 12:34 PM 20 <=99 mg/dL Final     Comment:                                 Near   Borderline      AGE      Desirable  Optimal    High     High     Very High     0-19 Y     0 - 109     ---    110-129   >/= 130     ----    20-24 Y     0 - 119     ---    120-159   >/= 160     ----      >24 Y     0 -  99   100-129  130-159   160-189     >/=190       VLDL   Date/Time Value Ref Range Status   11/21/2023 12:34 PM 11 0 - 40 mg/dL Final   09/13/2021 07:56 AM 23 0 - 40 mg/dL Final   03/18/2020 08:50 AM 14 0 - 40 mg/dL Final   02/20/2020 08:06 AM 9 0 - 40 mg/dL Final      Last I/O:  I/O last 3 completed shifts:  In: 892.2 (12.8 mL/kg) [P.O.:780; I.V.:62.2 (0.9 mL/kg); IV Piggyback:50]  Out: 1530 (21.9 mL/kg) [Urine:1530 (0.6 mL/kg/hr)]  Weight: 69.8 kg     Past Cardiology Tests (Last 3 Years):  EKG:  ECG 12 lead 05/08/2024 (Preliminary)      ECG 12 lead 05/07/2024 (Preliminary)    Echo:  Transthoracic  "Echo (TTE) Complete 05/07/2024    Ejection Fractions:  No results found for: \"EF\"  Cath:  No results found for this or any previous visit from the past 1095 days.    Stress Test:  No results found for this or any previous visit from the past 1095 days.    Cardiac Imaging:  No results found for this or any previous visit from the past 1095 days.      Inpatient Medications:  Scheduled medications   Medication Dose Route Frequency    acyclovir  400 mg oral Daily    amLODIPine  5 mg oral Daily    aspirin  81 mg oral Daily    atorvastatin  20 mg oral Nightly    azithromycin  500 mg oral Daily    carvedilol  25 mg oral BID with meals    cefTRIAXone  1 g intravenous q24h    cholecalciferol  5,000 Units oral Daily    enoxaparin  30 mg subcutaneous q24h    famotidine  20 mg oral BID    ferrous sulfate (325 mg ferrous sulfate)  1 tablet oral Daily with breakfast    [Held by provider] furosemide  20 mg intravenous BID    heparin flush  50 Units intra-catheter q12h    insulin NPH and regular human  10 Units subcutaneous BID AC    ipratropium-albuteroL  3 mL nebulization TID    levothyroxine  25 mcg oral Nightly    lidocaine  20 mL injection Once    methylPREDNISolone sodium succinate (PF)  40 mg intravenous q8h    pantoprazole  40 mg oral Nightly    polyethylene glycol  17 g oral Daily    [Held by provider] potassium chloride CR  10 mEq oral BID     PRN medications   Medication    acetaminophen    acetaminophen    albuterol    alteplase    benzocaine-menthol    dextromethorphan-guaifenesin    dextrose    dextrose    glucagon    glucagon    guaiFENesin    heparin flush    melatonin    ondansetron    oxygen    sodium chloride 0.9%     Continuous Medications   Medication Dose Last Rate    sodium chloride 0.9%  10 mL/hr 10 mL/hr (05/09/24 0653)       Physical Exam:  Constitutional: Well developed, awake/alert/oriented x3, no distress, alert and cooperative  Eyes: PERRL, EOMI, clear sclera  ENMT: mucous membranes moist, no apparent " injury, no lesions seen  Head/Neck: Neck supple, no apparent injury, thyroid without mass or tenderness, No JVD, trachea midline, no bruits  Respiratory/Thorax: Diffuse crackles and wheezes in left lung fields with moderately diminished air entry, inspiratory and expiratory wheezes right lungs  Cardiovascular: Regular, rate and rhythm, no murmurs, 2+ equal pulses of the extremities, normal S 1and S 2  Gastrointestinal: Nondistended, soft, non-tender, no rebound tenderness or guarding, no masses palpable, no organomegaly, +BS, no bruits  Musculoskeletal: ROM intact, no joint swelling, normal strength  Extremities: normal extremities, no cyanosis edema, contusions or wounds, no clubbing  Neurological: alert and oriented x3, intact senses, motor, response and reflexes, normal strength  Lymphatic: No significant lymphadenopathy  Psychological: Appropriate mood and behavior  Skin: Warm and dry, no lesions, no rashes      Assessment/Plan   Cardiac and Vasculature      Acute on chronic diastolic congestive heart failure (Multi)     Relevant Medications     carvedilol (Coreg) tablet 25 mg     amLODIPine (Norvasc) tablet 5 mg     Other Relevant Orders     Transthoracic Echo (TTE) Complete (Completed)          Pulmonary and Pneumonias     * (Principal) Pneumonia, unspecified organism - Primary      Other Visit Diagnoses         Shortness of breath                 Patient has been admitted to the hospital and monitored on telemetry.     He has acute hypoxic respiratory failure due to pneumonia, parapneumonic effusion, acute diastolic congestive heart failure.  Procalcitonin is elevated.      He is COVID and flu negative.      Agree with plans to maintain on oxygen, bronchodilators, broad-spectrum antibiotics.      He received diuretics with rising creatinine and plan is to closely monitor his volume status renal function hemodynamics and consider administering additional diuretics as needed in the next 48 hours.       Clinically he does appear euvolemic today.     Poor candidate for anticoagulants due to previous history of major gastrointestinal bleeding.     He will continue on rate control of atrial fibrillation with beta-blockers, continue aspirin for cardioprotection and stroke prevention, atorvastatin for hyperlipidemia amlodipine for hypertension.       He has chronic kidney disease stage III and has renal function will be watched closely avoid nephrotoxic medications.       He has chronic anemia and will continue on supplemental iron, close monitoring of hemoglobin, hematocrit.       Continue Revlimid or schedule for his lymphoma.       Continue levothyroxine for hypothyroidism, insulin for diabetes with close monitoring of blood sugars.     Echocardiogram was completed and shows vigorous LV systolic function with an ejection fraction of 70% moderate concentric left ventricular hypertrophy mildly reduced right ventricular function moderate aortic valve stenosis moderate mitral regurgitation normal estimated central venous pressure but moderately to severely elevated pulmonary artery systolic pressure.     Surgery consult reviewed.    CXR with decubitus films obtained and reviewed.      Plan discussed with nursing staff and hospitalist team  Implantable Port 05/07/24 Right Chest Single lumen port (Active)   Line Necessity Intravenous medication therapy 05/09/24 0700   Site Assessment Clean;Dry;Intact 05/09/24 0700   Dressing Status Clean;Dry;Occlusive 05/09/24 0700   Number of days: 2       Code Status:  Full Code    I spent 10 minutes in the professional and overall care of this patient.        Derik Haq MD

## 2024-05-09 NOTE — PROGRESS NOTES
"Shady Becerril is a 89 y.o. male on day 1 of admission presenting with Pneumonia, unspecified organism.    Subjective   Has a cough.  Saturating well on 1 L at 96%.  Left pleural effusion does not appear to layer.       Objective     Physical Exam  Constitutional:       Appearance: Normal appearance.   Pulmonary:      Breath sounds: Decreased air movement present. Wheezing present.         Last Recorded Vitals  Blood pressure 127/57, pulse 70, temperature 36.6 °C (97.9 °F), temperature source Temporal, resp. rate 20, height 1.676 m (5' 5.98\"), weight 69.8 kg (153 lb 14.1 oz), SpO2 97%.  Intake/Output last 3 Shifts:  I/O last 3 completed shifts:  In: 580 (8.3 mL/kg) [P.O.:580]  Out: 2030 (29.1 mL/kg) [Urine:2030 (0.8 mL/kg/hr)]  Weight: 69.8 kg     Relevant Results  Chest x-ray reviewed 5/8/2024      IMPRESSION:  1.  Moderate left pleural effusion with only minimal layering.            Assessment/Plan   Principal Problem:    Pneumonia, unspecified organism  Active Problems:    Pleural effusion on left    Plan-discussed the findings with the patient I also contacted the patient's primary care physician.  Effusion appears to been present last year.  This may not be a free-flowing effusion and may in fact be loculated.  At this time risk of placement of thoracostomy tube outweigh the benefits.  It would be preferable to obtain IR guided drainage of this area under ultrasound guidance.  This can be performed as an outpatient once the patient stabilized.  I discussed this with the patient agrees to the plan as outlined.  I also contacted the patient's wife.      Adarsh Chang MD      "

## 2024-05-09 NOTE — CARE PLAN
The patient's goals for the shift include      The clinical goals for the shift include Pt will not require supplemental o2    Pt was able to wean off supplemental oxygen with sats above 95% CXR is unchanged from yesterday. Pt tolerated all IV steroids, atb, and lasix with no adverse effects.  Problem: Pain - Adult  Goal: Verbalizes/displays adequate comfort level or baseline comfort level  Outcome: Progressing     Problem: Safety - Adult  Goal: Free from fall injury  Outcome: Progressing     Problem: Discharge Planning  Goal: Discharge to home or other facility with appropriate resources  Outcome: Progressing     Problem: Chronic Conditions and Co-morbidities  Goal: Patient's chronic conditions and co-morbidity symptoms are monitored and maintained or improved  Outcome: Progressing     Problem: Fall/Injury  Goal: Not fall by end of shift  Outcome: Progressing  Goal: Be free from injury by end of the shift  Outcome: Progressing  Goal: Verbalize understanding of personal risk factors for fall in the hospital  Outcome: Progressing  Goal: Verbalize understanding of risk factor reduction measures to prevent injury from fall in the home  Outcome: Progressing  Goal: Use assistive devices by end of the shift  Outcome: Progressing  Goal: Pace activities to prevent fatigue by end of the shift  Outcome: Progressing     Problem: Diabetes  Goal: Achieve decreasing blood glucose levels by end of shift  Outcome: Progressing  Goal: Increase stability of blood glucose readings by end of shift  Outcome: Progressing  Goal: Decrease in ketones present in urine by end of shift  Outcome: Progressing  Goal: Maintain electrolyte levels within acceptable range throughout shift  Outcome: Progressing  Goal: Maintain glucose levels >70mg/dl to <250mg/dl throughout shift  Outcome: Progressing  Goal: No changes in neurological exam by end of shift  Outcome: Progressing  Goal: Learn about and adhere to nutrition recommendations by end of  shift  Outcome: Progressing  Goal: Vital signs within normal range for age by end of shift  Outcome: Progressing  Goal: Increase self care and/or family involovement by end of shift  Outcome: Progressing  Goal: Receive DSME education by end of shift  Outcome: Progressing     Problem: Respiratory  Goal: Clear secretions with interventions this shift  Outcome: Progressing  Goal: Minimize anxiety/maximize coping throughout shift  Outcome: Progressing  Goal: Minimal/no exertional discomfort or dyspnea this shift  Outcome: Progressing  Goal: No signs of respiratory distress (eg. Use of accessory muscles. Peds grunting)  Outcome: Progressing  Goal: Patent airway maintained this shift  Outcome: Progressing  Goal: Tolerate pulmonary toileting this shift  Outcome: Progressing  Goal: Verbalize decreased shortness of breath this shift  Outcome: Progressing  Goal: Wean oxygen to maintain O2 saturation per order/standard this shift  Outcome: Progressing  Goal: Increase self care and/or family involvement in next 24 hours  Outcome: Progressing

## 2024-05-10 ENCOUNTER — PHARMACY VISIT (OUTPATIENT)
Dept: PHARMACY | Facility: CLINIC | Age: 89
End: 2024-05-10
Payer: COMMERCIAL

## 2024-05-10 VITALS
WEIGHT: 153.88 LBS | OXYGEN SATURATION: 96 % | HEIGHT: 66 IN | RESPIRATION RATE: 19 BRPM | HEART RATE: 69 BPM | BODY MASS INDEX: 24.73 KG/M2 | SYSTOLIC BLOOD PRESSURE: 135 MMHG | DIASTOLIC BLOOD PRESSURE: 65 MMHG | TEMPERATURE: 98.1 F

## 2024-05-10 DIAGNOSIS — J90 PLEURAL EFFUSION ON LEFT: ICD-10-CM

## 2024-05-10 LAB
ANION GAP SERPL CALC-SCNC: 10 MMOL/L (ref 10–20)
ATRIAL RATE: 60 BPM
ATRIAL RATE: 85 BPM
BUN SERPL-MCNC: 38 MG/DL (ref 6–23)
CALCIUM SERPL-MCNC: 9.2 MG/DL (ref 8.6–10.3)
CHLORIDE SERPL-SCNC: 99 MMOL/L (ref 98–107)
CO2 SERPL-SCNC: 28 MMOL/L (ref 21–32)
CREAT SERPL-MCNC: 1.64 MG/DL (ref 0.5–1.3)
EGFRCR SERPLBLD CKD-EPI 2021: 39 ML/MIN/1.73M*2
ERYTHROCYTE [DISTWIDTH] IN BLOOD BY AUTOMATED COUNT: 14 % (ref 11.5–14.5)
GLUCOSE SERPL-MCNC: 175 MG/DL (ref 74–99)
HCT VFR BLD AUTO: 31.8 % (ref 41–52)
HGB BLD-MCNC: 10.9 G/DL (ref 13.5–17.5)
MAGNESIUM SERPL-MCNC: 2.08 MG/DL (ref 1.6–2.4)
MCH RBC QN AUTO: 32.2 PG (ref 26–34)
MCHC RBC AUTO-ENTMCNC: 34.3 G/DL (ref 32–36)
MCV RBC AUTO: 94 FL (ref 80–100)
NRBC BLD-RTO: 0 /100 WBCS (ref 0–0)
PLATELET # BLD AUTO: 167 X10*3/UL (ref 150–450)
POTASSIUM SERPL-SCNC: 4.1 MMOL/L (ref 3.5–5.3)
Q ONSET: 226 MS
Q ONSET: 228 MS
QRS COUNT: 12 BEATS
QRS COUNT: 13 BEATS
QRS DURATION: 72 MS
QRS DURATION: 82 MS
QT INTERVAL: 354 MS
QT INTERVAL: 384 MS
QTC CALCULATION(BAZETT): 398 MS
QTC CALCULATION(BAZETT): 442 MS
QTC FREDERICIA: 382 MS
QTC FREDERICIA: 423 MS
R AXIS: 25 DEGREES
R AXIS: 50 DEGREES
RBC # BLD AUTO: 3.39 X10*6/UL (ref 4.5–5.9)
SODIUM SERPL-SCNC: 133 MMOL/L (ref 136–145)
T AXIS: 193 DEGREES
T AXIS: 218 DEGREES
T OFFSET: 405 MS
T OFFSET: 418 MS
VENTRICULAR RATE: 76 BPM
VENTRICULAR RATE: 80 BPM
WBC # BLD AUTO: 4.1 X10*3/UL (ref 4.4–11.3)

## 2024-05-10 PROCEDURE — 2500000002 HC RX 250 W HCPCS SELF ADMINISTERED DRUGS (ALT 637 FOR MEDICARE OP, ALT 636 FOR OP/ED): Mod: IPSPLIT | Performed by: NURSE PRACTITIONER

## 2024-05-10 PROCEDURE — 2500000004 HC RX 250 GENERAL PHARMACY W/ HCPCS (ALT 636 FOR OP/ED): Mod: IPSPLIT | Performed by: NURSE PRACTITIONER

## 2024-05-10 PROCEDURE — RXMED WILLOW AMBULATORY MEDICATION CHARGE

## 2024-05-10 PROCEDURE — 94668 MNPJ CHEST WALL SBSQ: CPT | Mod: IPSPLIT

## 2024-05-10 PROCEDURE — 94640 AIRWAY INHALATION TREATMENT: CPT | Mod: IPSPLIT

## 2024-05-10 PROCEDURE — 2500000001 HC RX 250 WO HCPCS SELF ADMINISTERED DRUGS (ALT 637 FOR MEDICARE OP): Mod: IPSPLIT

## 2024-05-10 PROCEDURE — 2500000006 HC RX 250 W HCPCS SELF ADMINISTERED DRUGS (ALT 637 FOR ALL PAYERS): Mod: IPSPLIT,MUE | Performed by: NURSE PRACTITIONER

## 2024-05-10 PROCEDURE — 80048 BASIC METABOLIC PNL TOTAL CA: CPT | Mod: IPSPLIT | Performed by: NURSE PRACTITIONER

## 2024-05-10 PROCEDURE — 2500000004 HC RX 250 GENERAL PHARMACY W/ HCPCS (ALT 636 FOR OP/ED): Mod: IPSPLIT

## 2024-05-10 PROCEDURE — 2500000004 HC RX 250 GENERAL PHARMACY W/ HCPCS (ALT 636 FOR OP/ED): Mod: IPSPLIT | Performed by: INTERNAL MEDICINE

## 2024-05-10 PROCEDURE — 9420000001 HC RT PATIENT EDUCATION 5 MIN: Mod: IPSPLIT

## 2024-05-10 PROCEDURE — 83735 ASSAY OF MAGNESIUM: CPT | Mod: IPSPLIT | Performed by: NURSE PRACTITIONER

## 2024-05-10 PROCEDURE — 2500000001 HC RX 250 WO HCPCS SELF ADMINISTERED DRUGS (ALT 637 FOR MEDICARE OP): Mod: IPSPLIT | Performed by: NURSE PRACTITIONER

## 2024-05-10 PROCEDURE — 85027 COMPLETE CBC AUTOMATED: CPT | Mod: IPSPLIT | Performed by: NURSE PRACTITIONER

## 2024-05-10 RX ORDER — CEFUROXIME AXETIL 500 MG/1
500 TABLET ORAL 2 TIMES DAILY
Qty: 6 TABLET | Refills: 0 | Status: SHIPPED | OUTPATIENT
Start: 2024-05-10 | End: 2024-05-16 | Stop reason: HOSPADM

## 2024-05-10 RX ADMIN — Medication 50 UNITS: at 09:58

## 2024-05-10 RX ADMIN — INSULIN HUMAN 10 UNITS: 100 INJECTION, SUSPENSION SUBCUTANEOUS at 08:50

## 2024-05-10 RX ADMIN — CEFTRIAXONE 1 G: 1 INJECTION, SOLUTION INTRAVENOUS at 08:54

## 2024-05-10 RX ADMIN — INSULIN LISPRO 6 UNITS: 100 INJECTION, SOLUTION INTRAVENOUS; SUBCUTANEOUS at 09:02

## 2024-05-10 RX ADMIN — ASPIRIN 81 MG: 81 TABLET, COATED ORAL at 08:45

## 2024-05-10 RX ADMIN — AZITHROMYCIN DIHYDRATE 500 MG: 250 TABLET, FILM COATED ORAL at 08:44

## 2024-05-10 RX ADMIN — FERROUS SULFATE TAB 325 MG (65 MG ELEMENTAL FE) 1 TABLET: 325 (65 FE) TAB at 08:45

## 2024-05-10 RX ADMIN — ACYCLOVIR 400 MG: 200 CAPSULE ORAL at 08:45

## 2024-05-10 RX ADMIN — METHYLPREDNISOLONE SODIUM SUCCINATE 40 MG: 40 INJECTION, POWDER, FOR SOLUTION INTRAMUSCULAR; INTRAVENOUS at 03:30

## 2024-05-10 RX ADMIN — IPRATROPIUM BROMIDE AND ALBUTEROL SULFATE 3 ML: .5; 3 SOLUTION RESPIRATORY (INHALATION) at 10:19

## 2024-05-10 RX ADMIN — CHOLECALCIFEROL TAB 125 MCG (5000 UNIT) 5000 UNITS: 125 TAB at 08:44

## 2024-05-10 RX ADMIN — FAMOTIDINE 20 MG: 20 TABLET, FILM COATED ORAL at 08:45

## 2024-05-10 RX ADMIN — CARVEDILOL 25 MG: 25 TABLET, FILM COATED ORAL at 08:44

## 2024-05-10 ASSESSMENT — COGNITIVE AND FUNCTIONAL STATUS - GENERAL
MOBILITY SCORE: 24
DAILY ACTIVITIY SCORE: 24

## 2024-05-10 ASSESSMENT — PAIN SCALES - GENERAL: PAINLEVEL_OUTOF10: 0 - NO PAIN

## 2024-05-10 NOTE — PROGRESS NOTES
Patient is medically ready for discharge. Patient follow up information is on discharge instructions. Patient will be returning home. Patient is in agreement with discharge plan.    [facility]

## 2024-05-10 NOTE — DISCHARGE SUMMARY
Discharge Diagnosis  Acute Respiratory Failure with hypoxia  Community Acquired bacterial pneumonia  Acute on Chronic Diastolic Heart Failure  Bilateral pleural effusions; left greater than right  Hypomagnesia  Hypokalemia  Acute Kidney failure    Issues Requiring Follow-Up      Discharge Meds     Your medication list        START taking these medications        Instructions Last Dose Given Next Dose Due   cefuroxime 500 mg tablet  Commonly known as: Ceftin      Take 1 tablet (500 mg) by mouth 2 times a day for 3 days. Take with food              CONTINUE taking these medications        Instructions Last Dose Given Next Dose Due   acyclovir 400 mg tablet  Commonly known as: Zovirax           amLODIPine 5 mg tablet  Commonly known as: Norvasc           aspirin 81 mg EC tablet           atorvastatin 20 mg tablet  Commonly known as: Lipitor           carvedilol 25 mg tablet  Commonly known as: Coreg      TAKE 1 TABLET BY MOUTH TWICE DAILY WITH MEALS       ferrous sulfate (325 mg ferrous sulfate) tablet           insulin NPH and regular human 100 unit/mL (70-30) injection  Commonly known as: HumuLIN 70-30, NovoLIN 70-30           ONETOUCH DELICA LANCETS MISC           lancets 30 gauge misc  Commonly known as: OneTouch Delica Plus Lancet      USE 1 TO CHECK GLUCOSE THREE TIMES DAILY AS DIRECTED       lenalidomide 2.5 mg capsule  Commonly known as: Revlimid           levothyroxine 25 mcg tablet  Commonly known as: Synthroid, Levoxyl      Take 1 tablet (25 mcg) by mouth once daily.       mupirocin 2 % ointment  Commonly known as: Bactroban           nitroglycerin 0.4 mg SL tablet  Commonly known as: Nitrostat           OneTouch Verio test strips strip  Generic drug: blood sugar diagnostic           OneTouch Verio test strips strip  Generic drug: blood sugar diagnostic      USE 1 STRIP TO CHECK GLUCOSE THREE TIMES DAILY       pantoprazole 40 mg EC tablet  Commonly known as: ProtoNix      Take 1 tablet by mouth once daily        Pepcid 20 mg tablet  Generic drug: famotidine           potassium chloride CR 10 mEq ER tablet  Commonly known as: Klor-Con                     Where to Get Your Medications        These medications were sent to [facility]  [address] [address]      Hours: 9 AM to 5:30 PM Mon-Fri Phone: [phone]   cefuroxime 500 mg tablet         Test Results Pending At Discharge  Pending Labs       No current pending labs.        89 y.o. male presenting with shortness of breath. Pt states that Monday afternoon he developed a dry cough. As the night progressed, the cough began to get more frequent and bothersome. The patient said he went to bed Monday night and woke up in the middle of the night very short of breath and coughing. Imaging in the ED revealed some LLL PNA as well as some fluid overload/chf and an CHARLENE. Pt admitted to med/surg.     Hospital Course    Acute hypoxic respiratory failure  Community Acquired bacterial Pneumonia  -CXR: Findings of mild ongoing CHF with tiny right pleural effusion, and  small to moderate-sized left pleural effusion with associated left  basilar atelectasis. Previous CABG. Stable right-sided central venous MediPort.  -CT chest: Moderate size left pleural effusion with left lower lobe and  lingular consolidation/atelectasis. Consider pneumonia. Indeterminate focal region of fat stranding along the left lateral aspect of the pectoralis major muscle measuring 2.3 x 1.5 cm this could reflect focal infectious/inflammatory process, atypical region of gynecomastia, or other soft tissue mass.  -WBC 3.9  -Covid/flu negative  -Procalcitonin 2.63  -Sputum cx pending   -Supplemental oxygen to maintain an sp02 greater than 92%  -Currently on 2Lpm via NC  -Baseline RA  -RT to eval/treat  -Bronchodilators  -started IV solumedrol  -continue IV ceftriaxone, azithromycin (Day 4)     Acute on Chronic Diastolic Heart Failure   Bilateral Pleural effusions left greater than  right  CAD s/p CABG  Paroxysmal atrial fibrillation  HLD  Essential HTN  -CT chest: Moderate size left pleural effusion with left lower lobe and  lingular consolidation/atelectasis. Consider pneumonia. Indeterminate focal region of fat stranding along the left lateral aspect of the pectoralis major muscle measuring 2.3 x 1.5 cm this could reflect focal infectious/inflammatory process, atypical region of gynecomastia, or other soft tissue mass.  -Echo pending  -  -Trop 20   -Daily wt  -Strict I/O  -Cardiac monitoring  -Cardiology consult, appreciate recs  -continue IVP furosemide 20mg BID  -Not a candidate for blood thinners due to GI bleeding  -Not a candidate for watchman per EMR chart review   -Continue amlodipine, aspirin, atorvastatin, carvedilol   -Decubitus CXR: moderate left pleural effusion  -Called IR  [facility] to schedule for US guided thoracentesis; waiting for a call back     Hypomagnesia, resolved  - Mg 1.24 > today 2.08  - replaced with IV magnesium sulfate  - monitor Mg     Acute Kidney Failure  -Baseline creat ~ 1.2  -creatine on admission 1.64; Today creatine 1.64  -Likely caused from fluid overload, will diurese  -Daily BMP  -Renally dose medications as able      Iron deficiency anemia  -Hgb 11.4  -Continue ferrous sulfate  -Daily CBC     DM Type II  -hypoglycemia protocol  -continue NPH  -Monitor BG      Hx of lymphoplasma lymphoma   -Continue lenalidomide and valtrex     Hypothyroidisim  -Continue levothyroxine     DVT ppx  -continue enoxaparin     PUD ppx  -continue pantoprazole and famitodine     Code Status: Full      Disposition: Patient was stable to be discharged to home. He was discharged on cefuroxime. He will follow up with his PCP and with surgery. He will need an outpatient IR thoracentesis.    Total cumulative time spent in preparation of this discharge including documentation review, coordination of care with the medical team including PT/SW/care coordinators and treating  consultants, discussion with patient and pertinent family members and finalization of prescriptions, follow-up appointments, and this discharge summary was approximately 45 minutes.     Pertinent Physical Exam At Time of Discharge  Physical Exam  Vitals reviewed.   Constitutional:       Appearance: Normal appearance. He is normal weight.   HENT:      Head: Normocephalic and atraumatic.      Nose: Nose normal.      Mouth/Throat:      Mouth: Mucous membranes are moist.      Pharynx: Oropharynx is clear.   Eyes:      Conjunctiva/sclera: Conjunctivae normal.      Pupils: Pupils are equal, round, and reactive to light.   Cardiovascular:      Rate and Rhythm: Normal rate and regular rhythm.      Pulses: Normal pulses.      Heart sounds: Normal heart sounds.   Pulmonary:      Effort: Pulmonary effort is normal.      Breath sounds: Normal breath sounds.   Abdominal:      Palpations: Abdomen is soft.   Musculoskeletal:         General: Normal range of motion.      Cervical back: Normal range of motion and neck supple.   Skin:     General: Skin is warm and dry.   Neurological:      General: No focal deficit present.      Mental Status: He is alert and oriented to person, place, and time.   Psychiatric:         Mood and Affect: Mood normal.         Behavior: Behavior normal.         Outpatient Follow-Up  No future appointments.      Nusrat Montelongo, APRN-CNP

## 2024-05-10 NOTE — CARE PLAN
Problem: Pain - Adult  Goal: Verbalizes/displays adequate comfort level or baseline comfort level  Outcome: Progressing     Problem: Safety - Adult  Goal: Free from fall injury  Outcome: Progressing     Problem: Discharge Planning  Goal: Discharge to home or other facility with appropriate resources  Outcome: Progressing     Problem: Chronic Conditions and Co-morbidities  Goal: Patient's chronic conditions and co-morbidity symptoms are monitored and maintained or improved  Outcome: Progressing     Problem: Fall/Injury  Goal: Not fall by end of shift  Outcome: Progressing  Goal: Be free from injury by end of the shift  Outcome: Progressing  Goal: Verbalize understanding of personal risk factors for fall in the hospital  Outcome: Progressing  Goal: Verbalize understanding of risk factor reduction measures to prevent injury from fall in the home  Outcome: Progressing  Goal: Use assistive devices by end of the shift  Outcome: Progressing  Goal: Pace activities to prevent fatigue by end of the shift  Outcome: Progressing     Problem: Diabetes  Goal: Achieve decreasing blood glucose levels by end of shift  Outcome: Progressing  Goal: Increase stability of blood glucose readings by end of shift  Outcome: Progressing  Goal: Decrease in ketones present in urine by end of shift  Outcome: Progressing  Goal: Maintain electrolyte levels within acceptable range throughout shift  Outcome: Progressing  Goal: Maintain glucose levels >70mg/dl to <250mg/dl throughout shift  Outcome: Progressing  Goal: No changes in neurological exam by end of shift  Outcome: Progressing  Goal: Learn about and adhere to nutrition recommendations by end of shift  Outcome: Progressing  Goal: Vital signs within normal range for age by end of shift  Outcome: Progressing  Goal: Increase self care and/or family involovement by end of shift  Outcome: Progressing  Goal: Receive DSME education by end of shift  Outcome: Progressing     Problem:  Respiratory  Goal: Clear secretions with interventions this shift  Outcome: Progressing  Goal: Minimize anxiety/maximize coping throughout shift  Outcome: Progressing  Goal: Minimal/no exertional discomfort or dyspnea this shift  Outcome: Progressing  Goal: No signs of respiratory distress (eg. Use of accessory muscles. Peds grunting)  Outcome: Progressing  Goal: Patent airway maintained this shift  Outcome: Progressing  Goal: Tolerate pulmonary toileting this shift  Outcome: Progressing  Goal: Verbalize decreased shortness of breath this shift  Outcome: Progressing  Goal: Wean oxygen to maintain O2 saturation per order/standard this shift  Outcome: Progressing  Goal: Increase self care and/or family involvement in next 24 hours  Outcome: Progressing   The patient's goals for the shift include      The clinical goals for the shift include Pt will not require supplemental o2    Over the shift, the patient did not make progress toward the following goals. Barriers to progression include . Recommendations to address these barriers include .

## 2024-05-12 ENCOUNTER — APPOINTMENT (OUTPATIENT)
Dept: CARDIOLOGY | Facility: HOSPITAL | Age: 89
End: 2024-05-12
Payer: MEDICARE

## 2024-05-12 ENCOUNTER — APPOINTMENT (OUTPATIENT)
Dept: RADIOLOGY | Facility: HOSPITAL | Age: 89
End: 2024-05-12
Payer: MEDICARE

## 2024-05-12 ENCOUNTER — HOSPITAL ENCOUNTER (EMERGENCY)
Facility: HOSPITAL | Age: 89
Discharge: HOME | End: 2024-05-12
Attending: EMERGENCY MEDICINE
Payer: MEDICARE

## 2024-05-12 VITALS
RESPIRATION RATE: 22 BRPM | DIASTOLIC BLOOD PRESSURE: 55 MMHG | BODY MASS INDEX: 25.66 KG/M2 | OXYGEN SATURATION: 96 % | WEIGHT: 154 LBS | SYSTOLIC BLOOD PRESSURE: 138 MMHG | HEART RATE: 63 BPM | HEIGHT: 65 IN | TEMPERATURE: 97.6 F

## 2024-05-12 DIAGNOSIS — R06.00 DYSPNEA, UNSPECIFIED TYPE: Primary | ICD-10-CM

## 2024-05-12 DIAGNOSIS — I50.42 CHRONIC COMBINED SYSTOLIC AND DIASTOLIC CONGESTIVE HEART FAILURE (MULTI): ICD-10-CM

## 2024-05-12 LAB
ALBUMIN SERPL BCP-MCNC: 3.2 G/DL (ref 3.4–5)
ALP SERPL-CCNC: 128 U/L (ref 33–136)
ALT SERPL W P-5'-P-CCNC: 30 U/L (ref 10–52)
ANION GAP SERPL CALC-SCNC: 13 MMOL/L (ref 10–20)
APPEARANCE UR: CLEAR
AST SERPL W P-5'-P-CCNC: 29 U/L (ref 9–39)
BASOPHILS # BLD AUTO: 0.01 X10*3/UL (ref 0–0.1)
BASOPHILS NFR BLD AUTO: 0.2 %
BILIRUB SERPL-MCNC: 0.6 MG/DL (ref 0–1.2)
BILIRUB UR STRIP.AUTO-MCNC: NEGATIVE MG/DL
BNP SERPL-MCNC: 362 PG/ML (ref 0–99)
BUN SERPL-MCNC: 53 MG/DL (ref 6–23)
CALCIUM SERPL-MCNC: 8.9 MG/DL (ref 8.6–10.3)
CARDIAC TROPONIN I PNL SERPL HS: 23 NG/L (ref 0–20)
CARDIAC TROPONIN I PNL SERPL HS: 26 NG/L (ref 0–20)
CHLORIDE SERPL-SCNC: 99 MMOL/L (ref 98–107)
CO2 SERPL-SCNC: 27 MMOL/L (ref 21–32)
COLOR UR: COLORLESS
CREAT SERPL-MCNC: 1.86 MG/DL (ref 0.5–1.3)
EGFRCR SERPLBLD CKD-EPI 2021: 34 ML/MIN/1.73M*2
EOSINOPHIL # BLD AUTO: 0.01 X10*3/UL (ref 0–0.4)
EOSINOPHIL NFR BLD AUTO: 0.2 %
ERYTHROCYTE [DISTWIDTH] IN BLOOD BY AUTOMATED COUNT: 14.2 % (ref 11.5–14.5)
GLUCOSE SERPL-MCNC: 135 MG/DL (ref 74–99)
GLUCOSE UR STRIP.AUTO-MCNC: NORMAL MG/DL
HCT VFR BLD AUTO: 33 % (ref 41–52)
HGB BLD-MCNC: 11.3 G/DL (ref 13.5–17.5)
IMM GRANULOCYTES # BLD AUTO: 0.02 X10*3/UL (ref 0–0.5)
IMM GRANULOCYTES NFR BLD AUTO: 0.4 % (ref 0–0.9)
INR PPP: 1.3 (ref 0.9–1.1)
KETONES UR STRIP.AUTO-MCNC: NEGATIVE MG/DL
LACTATE SERPL-SCNC: 1 MMOL/L (ref 0.4–2)
LEUKOCYTE ESTERASE UR QL STRIP.AUTO: NEGATIVE
LYMPHOCYTES # BLD AUTO: 1.13 X10*3/UL (ref 0.8–3)
LYMPHOCYTES NFR BLD AUTO: 22.2 %
MAGNESIUM SERPL-MCNC: 1.88 MG/DL (ref 1.6–2.4)
MCH RBC QN AUTO: 32.4 PG (ref 26–34)
MCHC RBC AUTO-ENTMCNC: 34.2 G/DL (ref 32–36)
MCV RBC AUTO: 95 FL (ref 80–100)
MONOCYTES # BLD AUTO: 0.68 X10*3/UL (ref 0.05–0.8)
MONOCYTES NFR BLD AUTO: 13.3 %
NEUTROPHILS # BLD AUTO: 3.25 X10*3/UL (ref 1.6–5.5)
NEUTROPHILS NFR BLD AUTO: 63.7 %
NITRITE UR QL STRIP.AUTO: NEGATIVE
NRBC BLD-RTO: 0 /100 WBCS (ref 0–0)
PH UR STRIP.AUTO: 5 [PH]
PLATELET # BLD AUTO: 173 X10*3/UL (ref 150–450)
POTASSIUM SERPL-SCNC: 3.3 MMOL/L (ref 3.5–5.3)
PROT SERPL-MCNC: 7.8 G/DL (ref 6.4–8.2)
PROT UR STRIP.AUTO-MCNC: NEGATIVE MG/DL
PROTHROMBIN TIME: 14.5 SECONDS (ref 9.8–12.8)
RBC # BLD AUTO: 3.49 X10*6/UL (ref 4.5–5.9)
RBC # UR STRIP.AUTO: NEGATIVE /UL
SARS-COV-2 RNA RESP QL NAA+PROBE: NOT DETECTED
SODIUM SERPL-SCNC: 136 MMOL/L (ref 136–145)
SP GR UR STRIP.AUTO: 1.01
UROBILINOGEN UR STRIP.AUTO-MCNC: NORMAL MG/DL
WBC # BLD AUTO: 5.1 X10*3/UL (ref 4.4–11.3)

## 2024-05-12 PROCEDURE — 85610 PROTHROMBIN TIME: CPT | Performed by: EMERGENCY MEDICINE

## 2024-05-12 PROCEDURE — 83605 ASSAY OF LACTIC ACID: CPT | Performed by: EMERGENCY MEDICINE

## 2024-05-12 PROCEDURE — 80053 COMPREHEN METABOLIC PANEL: CPT | Performed by: EMERGENCY MEDICINE

## 2024-05-12 PROCEDURE — 71045 X-RAY EXAM CHEST 1 VIEW: CPT | Mod: FOREIGN READ | Performed by: RADIOLOGY

## 2024-05-12 PROCEDURE — 2500000001 HC RX 250 WO HCPCS SELF ADMINISTERED DRUGS (ALT 637 FOR MEDICARE OP): Performed by: EMERGENCY MEDICINE

## 2024-05-12 PROCEDURE — 84484 ASSAY OF TROPONIN QUANT: CPT | Performed by: EMERGENCY MEDICINE

## 2024-05-12 PROCEDURE — 94640 AIRWAY INHALATION TREATMENT: CPT

## 2024-05-12 PROCEDURE — 2500000006 HC RX 250 W HCPCS SELF ADMINISTERED DRUGS (ALT 637 FOR ALL PAYERS): Performed by: EMERGENCY MEDICINE

## 2024-05-12 PROCEDURE — 2500000002 HC RX 250 W HCPCS SELF ADMINISTERED DRUGS (ALT 637 FOR MEDICARE OP, ALT 636 FOR OP/ED): Mod: MUE | Performed by: EMERGENCY MEDICINE

## 2024-05-12 PROCEDURE — 87635 SARS-COV-2 COVID-19 AMP PRB: CPT | Performed by: EMERGENCY MEDICINE

## 2024-05-12 PROCEDURE — 83880 ASSAY OF NATRIURETIC PEPTIDE: CPT | Performed by: EMERGENCY MEDICINE

## 2024-05-12 PROCEDURE — 93005 ELECTROCARDIOGRAM TRACING: CPT

## 2024-05-12 PROCEDURE — 36415 COLL VENOUS BLD VENIPUNCTURE: CPT | Performed by: EMERGENCY MEDICINE

## 2024-05-12 PROCEDURE — 96374 THER/PROPH/DIAG INJ IV PUSH: CPT

## 2024-05-12 PROCEDURE — 2500000004 HC RX 250 GENERAL PHARMACY W/ HCPCS (ALT 636 FOR OP/ED)

## 2024-05-12 PROCEDURE — 85025 COMPLETE CBC W/AUTO DIFF WBC: CPT | Performed by: EMERGENCY MEDICINE

## 2024-05-12 PROCEDURE — 2500000002 HC RX 250 W HCPCS SELF ADMINISTERED DRUGS (ALT 637 FOR MEDICARE OP, ALT 636 FOR OP/ED): Mod: MUE

## 2024-05-12 PROCEDURE — 83735 ASSAY OF MAGNESIUM: CPT | Performed by: EMERGENCY MEDICINE

## 2024-05-12 PROCEDURE — 81003 URINALYSIS AUTO W/O SCOPE: CPT | Performed by: EMERGENCY MEDICINE

## 2024-05-12 PROCEDURE — 99284 EMERGENCY DEPT VISIT MOD MDM: CPT | Mod: 25

## 2024-05-12 PROCEDURE — 71045 X-RAY EXAM CHEST 1 VIEW: CPT

## 2024-05-12 RX ORDER — POTASSIUM CHLORIDE 20 MEQ/1
40 TABLET, EXTENDED RELEASE ORAL ONCE
Status: COMPLETED | OUTPATIENT
Start: 2024-05-12 | End: 2024-05-12

## 2024-05-12 RX ORDER — ALBUTEROL SULFATE 90 UG/1
2 AEROSOL, METERED RESPIRATORY (INHALATION) ONCE
Status: COMPLETED | OUTPATIENT
Start: 2024-05-12 | End: 2024-05-12

## 2024-05-12 RX ORDER — IPRATROPIUM BROMIDE AND ALBUTEROL SULFATE 2.5; .5 MG/3ML; MG/3ML
SOLUTION RESPIRATORY (INHALATION)
Status: COMPLETED
Start: 2024-05-12 | End: 2024-05-12

## 2024-05-12 RX ORDER — FUROSEMIDE 10 MG/ML
40 INJECTION INTRAMUSCULAR; INTRAVENOUS ONCE
Status: COMPLETED | OUTPATIENT
Start: 2024-05-12 | End: 2024-05-12

## 2024-05-12 RX ORDER — FUROSEMIDE 10 MG/ML
INJECTION INTRAMUSCULAR; INTRAVENOUS
Status: COMPLETED
Start: 2024-05-12 | End: 2024-05-12

## 2024-05-12 RX ORDER — IPRATROPIUM BROMIDE AND ALBUTEROL SULFATE 2.5; .5 MG/3ML; MG/3ML
3 SOLUTION RESPIRATORY (INHALATION) EVERY 20 MIN
Status: COMPLETED | OUTPATIENT
Start: 2024-05-12 | End: 2024-05-12

## 2024-05-12 RX ADMIN — IPRATROPIUM BROMIDE AND ALBUTEROL SULFATE 3 ML: .5; 3 SOLUTION RESPIRATORY (INHALATION) at 11:48

## 2024-05-12 RX ADMIN — ALBUTEROL SULFATE 2 PUFF: 90 AEROSOL, METERED RESPIRATORY (INHALATION) at 15:08

## 2024-05-12 RX ADMIN — IPRATROPIUM BROMIDE AND ALBUTEROL SULFATE 3 ML: .5; 3 SOLUTION RESPIRATORY (INHALATION) at 12:23

## 2024-05-12 RX ADMIN — FUROSEMIDE 40 MG: 10 INJECTION INTRAMUSCULAR; INTRAVENOUS at 11:47

## 2024-05-12 RX ADMIN — POTASSIUM CHLORIDE 40 MEQ: 1500 TABLET, EXTENDED RELEASE ORAL at 15:06

## 2024-05-12 RX ADMIN — FUROSEMIDE 40 MG: 10 INJECTION, SOLUTION INTRAMUSCULAR; INTRAVENOUS at 11:47

## 2024-05-12 ASSESSMENT — PAIN - FUNCTIONAL ASSESSMENT
PAIN_FUNCTIONAL_ASSESSMENT: 0-10
PAIN_FUNCTIONAL_ASSESSMENT: 0-10

## 2024-05-12 ASSESSMENT — COLUMBIA-SUICIDE SEVERITY RATING SCALE - C-SSRS
2. HAVE YOU ACTUALLY HAD ANY THOUGHTS OF KILLING YOURSELF?: NO
2. HAVE YOU ACTUALLY HAD ANY THOUGHTS OF KILLING YOURSELF?: NO
6. HAVE YOU EVER DONE ANYTHING, STARTED TO DO ANYTHING, OR PREPARED TO DO ANYTHING TO END YOUR LIFE?: NO
6. HAVE YOU EVER DONE ANYTHING, STARTED TO DO ANYTHING, OR PREPARED TO DO ANYTHING TO END YOUR LIFE?: NO
1. IN THE PAST MONTH, HAVE YOU WISHED YOU WERE DEAD OR WISHED YOU COULD GO TO SLEEP AND NOT WAKE UP?: NO
1. IN THE PAST MONTH, HAVE YOU WISHED YOU WERE DEAD OR WISHED YOU COULD GO TO SLEEP AND NOT WAKE UP?: NO

## 2024-05-12 ASSESSMENT — PAIN SCALES - GENERAL
PAINLEVEL_OUTOF10: 0 - NO PAIN
PAINLEVEL_OUTOF10: 0 - NO PAIN

## 2024-05-12 NOTE — DISCHARGE INSTRUCTIONS
Chest x-ray shows no acute change in comparison to previous.  Continue home-going medications as directed.  Use albuterol inhaler with spacer, 2 puffs every 4 hours as needed.  Please return if acutely worse or new worrisome symptoms.  Follow-up with your primary care in the next 2 to 3 days for recheck.

## 2024-05-12 NOTE — ED PROVIDER NOTES
Department of Emergency Medicine   ED  Provider Note  Admit Date/RoomTime: 5/12/2024 10:29 AM  ED Room: AC06/AC06                  History of Present Illness:   Shady Becerril is a 89 y.o. male presenting to the ED for COUGH/SOB/WHEEZING, beginning Saturday night.  The complaint has been persistent, mild in severity, and worsened by nothing.  Patient was recently admitted for CHF and pneumonia 5 days ago.  He was discharged on Friday 2 days ago.  Patient reports increasing cough shortness of breath and wheezing starting last night.  He has had no fever or chills.  No associated chest pain.  No leg pain swelling or calf tenderness.  He says this is similar to how he felt when he was admitted this past Tuesday.  He has had decreased sleeping secondary to his shortness of breath.  He denies fever or chills.  Cough is been nonproductive.      Review of Systems:   Pertinent positives and review of systems as noted above.  Remaining 10 review of systems is negative or noncontributory to today's episode of care.  Review of Systems   A complete review of systems is otherwise negative except as noted above.    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), Acquired stenosis of left nasolacrimal duct (06/01/2021), Body mass index (BMI) 31.0-31.9, adult (03/02/2020), Disorder of the skin and subcutaneous tissue, unspecified, Hepatomegaly, not elsewhere classified (04/01/2020), Low grade B-cell lymphoma (Multi), Multiple myeloma (Multi), Noninfective gastroenteritis and colitis, unspecified (08/31/2020), Ocular laceration without prolapse or loss of intraocular tissue, unspecified eye, initial encounter (02/15/2021), Other abnormal findings in  specimens from other organs, systems and tissues (05/18/2020), Other conditions influencing health status (05/18/2020), Personal history of other diseases of the circulatory system, Personal history of other diseases of the circulatory system (05/05/2020), Personal history of other diseases of the musculoskeletal system and connective tissue (03/10/2020), Personal history of other infectious and parasitic diseases (10/19/2020), Personal history of other specified conditions (10/20/2020), Shortness of breath (03/19/2020), Unsteadiness on feet (05/06/2020), and Unsteadiness on feet (05/06/2020).    Past Surgical History:  has no past surgical history on file.    Social History:  reports that he quit smoking about 44 years ago. His smoking use included pipe. He has never used smokeless tobacco. He reports that he does not currently use alcohol. He reports that he does not use drugs.    Family History: family history is not on file. Unless otherwise noted, family history is non contributory    Patient's Medications   New Prescriptions    No medications on file   Previous Medications    ACYCLOVIR (ZOVIRAX) 400 MG TABLET    Take 1 tablet (400 mg) by mouth once daily.    AMLODIPINE (NORVASC) 5 MG TABLET    Take 1 tablet (5 mg) by mouth once daily.    ASPIRIN 81 MG EC TABLET    Take 1 tablet (81 mg) by mouth once daily.    ATORVASTATIN (LIPITOR) 20 MG TABLET    Take 1 tablet (20 mg) by mouth once daily at bedtime.    BLOOD SUGAR DIAGNOSTIC (ONETOUCH VERIO TEST STRIPS) STRIP    1 strip 1 STRIP TO CHECK GLUCOSE THREE TIMES DAILY  (route: miscellaneous)    CARVEDILOL (COREG) 25 MG TABLET    TAKE 1 TABLET BY MOUTH TWICE DAILY WITH MEALS    CEFUROXIME (CEFTIN) 500 MG TABLET    Take 1 tablet (500 mg) by mouth 2 times a day for 3 days. Take with food    FAMOTIDINE (PEPCID) 20 MG TABLET    Take 1 tablet (20 mg) by mouth 2 times a day.    FERROUS SULFATE, 325 MG FERROUS SULFATE, TABLET    Take 1 tablet by mouth once daily with  breakfast.    INSULIN NPH AND REGULAR HUMAN (HUMULIN 70-30, NOVOLIN 70-30) 100 UNIT/ML (70-30) INJECTION    Inject 10 Units under the skin 2 times a day before meals. Take as directed per insulin instructions.    LANCETS (ONETOUCH DELICA PLUS LANCET) 30 GAUGE MISC    USE 1 TO CHECK GLUCOSE THREE TIMES DAILY AS DIRECTED    LENALIDOMIDE (REVLIMID) 2.5 MG CAPSULE    TAKE 1 CAPSULE BY MOUTH DAILY  FOR 21 DAYS, THEN 7 DAYS OFF    LEVOTHYROXINE (SYNTHROID, LEVOXYL) 25 MCG TABLET    Take 1 tablet (25 mcg) by mouth once daily.    MUPIROCIN (BACTROBAN) 2 % OINTMENT    APPLY 1 APPLICATION TOPICALLY TO AFFECTED AREA THREE TIMES DAILY    NITROGLYCERIN (NITROSTAT) 0.4 MG SL TABLET    Place 1 tablet (0.4 mg) under the tongue.    ONETOUCH DELICA LANCETS MISC    USE 1  TO CHECK GLUCOSE 3 TIMES DAILY AS DIRECTED  e11.65    ONETOUCH VERIO TEST STRIPS STRIP    USE 1 STRIP TO CHECK GLUCOSE THREE TIMES DAILY    PANTOPRAZOLE (PROTONIX) 40 MG EC TABLET    Take 1 tablet by mouth once daily    POTASSIUM CHLORIDE CR 10 MEQ ER TABLET    Take 1 tablet (10 mEq) by mouth 2 times a day.   Modified Medications    No medications on file   Discontinued Medications    No medications on file      The patient’s home medications have been reviewed.    Allergies: Patient has no known allergies.    -------------------------------------------------- RESULTS -------------------------------------------------  All laboratory and radiology results have been personally reviewed by myself   LABS:  Labs Reviewed   CBC WITH AUTO DIFFERENTIAL - Abnormal       Result Value    WBC 5.1      nRBC 0.0      RBC 3.49 (*)     Hemoglobin 11.3 (*)     Hematocrit 33.0 (*)     MCV 95      MCH 32.4      MCHC 34.2      RDW 14.2      Platelets 173      Neutrophils % 63.7      Immature Granulocytes %, Automated 0.4      Lymphocytes % 22.2      Monocytes % 13.3      Eosinophils % 0.2      Basophils % 0.2      Neutrophils Absolute 3.25      Immature Granulocytes Absolute, Automated  0.02      Lymphocytes Absolute 1.13      Monocytes Absolute 0.68      Eosinophils Absolute 0.01      Basophils Absolute 0.01     COMPREHENSIVE METABOLIC PANEL - Abnormal    Glucose 135 (*)     Sodium 136      Potassium 3.3 (*)     Chloride 99      Bicarbonate 27      Anion Gap 13      Urea Nitrogen 53 (*)     Creatinine 1.86 (*)     eGFR 34 (*)     Calcium 8.9      Albumin 3.2 (*)     Alkaline Phosphatase 128      Total Protein 7.8      AST 29      Bilirubin, Total 0.6      ALT 30     PROTIME-INR - Abnormal    Protime 14.5 (*)     INR 1.3 (*)    B-TYPE NATRIURETIC PEPTIDE - Abnormal     (*)     Narrative:        <100 pg/mL - Heart failure unlikely  100-299 pg/mL - Intermediate probability of acute heart                  failure exacerbation. Correlate with clinical                  context and patient history.    >=300 pg/mL - Heart Failure likely. Correlate with clinical                  context and patient history.    BNP testing is performed using different testing methodology at [facility] than at other [facility]. Direct result comparisons should only be made within the same method.      URINALYSIS WITH REFLEX CULTURE AND MICROSCOPIC - Abnormal    Color, Urine Colorless (*)     Appearance, Urine Clear      Specific Gravity, Urine 1.007      pH, Urine 5.0      Protein, Urine NEGATIVE      Glucose, Urine Normal      Blood, Urine NEGATIVE      Ketones, Urine NEGATIVE      Bilirubin, Urine NEGATIVE      Urobilinogen, Urine Normal      Nitrite, Urine NEGATIVE      Leukocyte Esterase, Urine NEGATIVE     SERIAL TROPONIN-INITIAL - Abnormal    Troponin I, High Sensitivity 26 (*)     Narrative:     Less than 99th percentile of normal range cutoff-  Female and children under 18 years old <14 ng/L; Male <21 ng/L: Negative  Repeat testing should be performed if clinically indicated.     Female and children under 18 years old 14-50 ng/L; Male 21-50 ng/L:  Consistent with possible cardiac damage and  possible increased clinical   risk. Serial measurements may help to assess extent of myocardial damage.     >50 ng/L: Consistent with cardiac damage, increased clinical risk and  myocardial infarction. Serial measurements may help assess extent of   myocardial damage.      NOTE: Children less than 1 year old may have higher baseline troponin   levels and results should be interpreted in conjunction with the overall   clinical context.     NOTE: Troponin I testing is performed using a different   testing methodology at [facility] [place] than at other   [facility]. Direct result comparisons should only   be made within the same method.   SERIAL TROPONIN, 1 HOUR - Abnormal    Troponin I, High Sensitivity 23 (*)     Narrative:     Less than 99th percentile of normal range cutoff-  Female and children under 18 years old <14 ng/L; Male <21 ng/L: Negative  Repeat testing should be performed if clinically indicated.     Female and children under 18 years old 14-50 ng/L; Male 21-50 ng/L:  Consistent with possible cardiac damage and possible increased clinical   risk. Serial measurements may help to assess extent of myocardial damage.     >50 ng/L: Consistent with cardiac damage, increased clinical risk and  myocardial infarction. Serial measurements may help assess extent of   myocardial damage.      NOTE: Children less than 1 year old may have higher baseline troponin   levels and results should be interpreted in conjunction with the overall   clinical context.     NOTE: Troponin I testing is performed using a different   testing methodology at [facility] [place] than at other   [facility]. Direct result comparisons should only   be made within the same method.   MAGNESIUM - Normal    Magnesium 1.88     LACTATE - Normal    Lactate 1.0      Narrative:     Venipuncture immediately after or during the administration of Metamizole may lead to falsely low results. Testing should be performed  immediately  prior to Metamizole dosing.   SARS-COV-2 PCR - Normal    Coronavirus 2019, PCR Not Detected      Narrative:     This assay has received FDA Emergency Use Authorization (EUA) and is only authorized for the duration of time that circumstances exist to justify the authorization of the emergency use of in vitro diagnostic tests for the detection of SARS-CoV-2 virus and/or diagnosis of COVID-19 infection under section 564(b)(1) of the Act, 21 U.S.C. 360bbb-3(b)(1). This assay is an in vitro diagnostic nucleic acid amplification test for the qualitative detection of SARS-CoV-2 from nasopharyngeal specimens and has been validated for use at [facility]. Negative results do not preclude COVID-19 infections and should not be used as the sole basis for diagnosis, treatment, or other management decisions.     TROPONIN SERIES- (INITIAL, 1 HR)    Narrative:     The following orders were created for panel order Troponin I Series, High Sensitivity (0, 1 HR).  Procedure                               Abnormality         Status                     ---------                               -----------         ------                     Troponin I, High Sensiti...[[id number]]  Abnormal            Final result               Troponin, High Sensitivi...[[id number]]  Abnormal            Final result                 Please view results for these tests on the individual orders.   URINALYSIS WITH REFLEX CULTURE AND MICROSCOPIC    Narrative:     The following orders were created for panel order Urinalysis with Reflex Culture and Microscopic.  Procedure                               Abnormality         Status                     ---------                               -----------         ------                     Urinalysis with Reflex C...[[id number]]  Abnormal            Final result               Extra Urine Gray Tube[[id number]]                                                         Please view results for  "these tests on the individual orders.   EXTRA URINE GRAY TUBE         RADIOLOGY:  Interpreted by Radiologist.  XR chest 1 view   Final Result   1.Moderate left pleural effusion with associated nonspecific   consolidation in the left base, unchanged. The lungs are otherwise   clear.   2.Vascular access port over the right side of the chest.   Signed by Raciel Neff MD          Encounter Date: 05/07/24   ECG 12 lead   Result Value    Ventricular Rate 76    Atrial Rate 60    QRS Duration 82    QT Interval 354    QTC Calculation(Bazett) 398    R Axis 25    T Axis 218    QRS Count 13    Q Onset 228    T Offset 405    QTC Fredericia 382    Narrative    Atrial fibrillation  Low voltage QRS  Septal infarct (cited on or before 07-MAY-2024)  ST & T wave abnormality, consider anterior ischemia  Abnormal ECG  When compared with ECG of 07-MAY-2024 04:59, (unconfirmed)  No significant change was found  See ED provider note for full interpretation and clinical correlation  Confirmed by Milvia Calero ([id number]) on 5/10/2024 1:28:07 PM     ------------------------- NURSING NOTES AND VITALS REVIEWED ---------------------------   The nursing notes within the ED encounter and vital signs as below have been reviewed.   BP (!) 151/133   Pulse 58   Temp 36.4 °C (97.6 °F) (Temporal)   Resp (!) 21   Ht 1.651 m (5' 5\")   Wt 69.9 kg (154 lb)   SpO2 94%   BMI 25.63 kg/m²   Oxygen Saturation Interpretation: Normal      ---------------------------------------------------PHYSICAL EXAM--------------------------------------  Physical Exam  Vitals and nursing note reviewed.   Constitutional:       General: He is not in acute distress.     Appearance: He is well-developed. He is not ill-appearing or toxic-appearing.   HENT:      Head: Normocephalic and atraumatic.      Mouth/Throat:      Mouth: Mucous membranes are moist.      Pharynx: Oropharynx is clear. No oropharyngeal exudate.   Eyes:      Extraocular Movements: Extraocular movements " intact.      Conjunctiva/sclera: Conjunctivae normal.      Pupils: Pupils are equal, round, and reactive to light.   Cardiovascular:      Rate and Rhythm: Regular rhythm. Tachycardia present.      Heart sounds: No murmur heard.  Pulmonary:      Effort: Pulmonary effort is normal. No accessory muscle usage or respiratory distress.      Breath sounds: Examination of the right-middle field reveals rhonchi. Examination of the left-middle field reveals rhonchi. Examination of the right-lower field reveals decreased breath sounds, wheezing, rhonchi and rales. Examination of the left-lower field reveals decreased breath sounds, wheezing, rhonchi and rales. Decreased breath sounds, wheezing, rhonchi and rales present.   Chest:      Chest wall: No mass, deformity, tenderness, crepitus or edema.   Abdominal:      Palpations: Abdomen is soft.      Tenderness: There is no abdominal tenderness.   Musculoskeletal:         General: No swelling.      Cervical back: Neck supple.      Right lower leg: No tenderness. No edema.      Left lower leg: No tenderness. No edema.   Skin:     General: Skin is warm and dry.      Capillary Refill: Capillary refill takes less than 2 seconds.      Coloration: Skin is not cyanotic.      Nails: There is no clubbing.   Neurological:      Mental Status: He is alert and oriented to person, place, and time.   Psychiatric:         Mood and Affect: Mood normal.            Procedures  None  ------------------------------ ED COURSE/MEDICAL DECISION MAKING----------------------    Medical Decision Making:   Patient was seen and examined by me.  An IV is started appropriate labs been ordered.  Chest x-ray imaging has been ordered.  Patient received DuoNeb aerosol.  IV Lasix 40 mg.  On reevaluation the patient was resting comfortably.  I reviewed lab work with the patient and his wife as well as chest x-ray results.  I did offer the patient readmission if he felt he was too short of breath or sick to go home  but he would like to go home.  I think this is reasonable based on his workup.  There is no significant change.  I have suggested that he take his Demadex every day for the next couple of days.  He verbalizes understanding of this.    The patient and his wife were reminded to return if acutely worsening or worrisome symptoms.  Will discharge home with an albuterol MDI with spacer 2 puffs every 4 hours as needed.  Patient is encouraged to continue his regular home medications as directed and take his Demadex every day.  Return if acutely worsening or worrisome symptoms.  Written and verbal instructions were provided and they verbalized understanding.      ED Course as of 05/12/24 1448   Sun May 12, 2024   1041 EKG at 1030 interpreted by me at 1037.  Atrial fibrillation with slow ventricular response.  Rate 50 bpm.  Axis grossly normal.  Low voltage EKG.  QRS 86 ms,  ms.  No acute ST segment elevation or depression.  There is T wave flattening.  No STEMI. [EC]   1044 Compared with EKG on 5/7/2024.  The rate today is slower 50 bpm.  Previously was A-fib with a rate of 76 bpm.  Otherwise no significant change. [EC]   1134 CXR  IMPRESSION:  1.Moderate left pleural effusion with associated nonspecific  consolidation in the left base, unchanged. The lungs are otherwise  clear.   [EC]   1339 CBC and Auto Differential(!)  CBC is unremarkable [EC]   1339 B-Type Natriuretic Peptide(!)  BNP is elevated 362 [EC]   1339 Comprehensive metabolic panel(!)  Glucose 135, sodium 136, potassium 3.3, chloride 99, bicarb 27, anion gap 13    BUN 53, creatinine 1.86, GFR 34 this is similar to previous values.    LFTs are unremarkable. [EC]   1342 First troponin is 26, second troponin is 23. [EC]   1342 Lactate  Lactate is normal 1.0  Magnesium is normal 1.88 [EC]   1343 Sars-CoV-2 PCR  Coronavirus is not detected [EC]   1441 On reassessment the patient is feeling improved.  I discussed findings with the patient and his wife.  The  patient would like to go home.  He states he will take his Demadex at home.  Patient invited to return if acutely worsening or worrisome symptoms.  I have encouraged him to continue his home medications as directed upon discharge this past Friday. [EC]      ED Course User Index  [EC] Larry Harden DO         Diagnoses as of 05/12/24 1448   Dyspnea, unspecified type   Chronic combined systolic and diastolic congestive heart failure (Multi)      Counseling:   The emergency provider has spoken with the patient and discussed today’s results, in addition to providing specific details for the plan of care and counseling regarding the diagnosis and prognosis.  Questions are answered at this time and they are agreeable with the plan.      --------------------------------- IMPRESSION AND DISPOSITION ---------------------------------        IMPRESSION  1. Dyspnea, unspecified type    2. Chronic combined systolic and diastolic congestive heart failure (Multi)        DISPOSITION  Disposition: Discharge to home  Patient condition is fair      Billing Provider Critical Care Time: 0 minutes     Larry Harden DO  05/12/24 1448

## 2024-05-13 ENCOUNTER — TELEPHONE (OUTPATIENT)
Dept: PRIMARY CARE | Facility: CLINIC | Age: 89
End: 2024-05-13
Payer: MEDICARE

## 2024-05-13 RX ORDER — ALBUTEROL SULFATE 0.83 MG/ML
2.5 SOLUTION RESPIRATORY (INHALATION)
COMMUNITY
Start: 2024-05-07 | End: 2024-05-23 | Stop reason: ALTCHOICE

## 2024-05-13 NOTE — TELEPHONE ENCOUNTER
Transition of Care    Inpatient facility: [facility]  Discharge diagnosis: PNE  Discharged to: Home  Discharge date: 5/10/24  Initial Call date: 5/13/24  Spoke with patient/caregiver: Caregiver/ Spouse                                                                     Do you need assistance  visits prior to your PCP visit: No  Home health care ordered: No  Have you been contacted by home care and have a start of care date: No  Are you taking medications as prescribed at discharge: Yes    Referral to [facility] Pharmacist: No  Patient advised to bring all medications to PCP follow-up appointment.  Patient advised to follow discharge instructions until provider follow-up.  TCM visit date: 5/16/24  TCM provider visit with: Juliette Zazueta MD

## 2024-05-15 ENCOUNTER — HOSPITAL ENCOUNTER (EMERGENCY)
Facility: HOSPITAL | Age: 89
Discharge: OTHER INSTITUTION | End: 2024-05-15
Attending: EMERGENCY MEDICINE
Payer: MEDICARE

## 2024-05-15 ENCOUNTER — APPOINTMENT (OUTPATIENT)
Dept: CARDIOLOGY | Facility: HOSPITAL | Age: 89
End: 2024-05-15
Payer: MEDICARE

## 2024-05-15 ENCOUNTER — APPOINTMENT (OUTPATIENT)
Dept: RADIOLOGY | Facility: HOSPITAL | Age: 89
End: 2024-05-15
Payer: MEDICARE

## 2024-05-15 ENCOUNTER — HOSPITAL ENCOUNTER (INPATIENT)
Facility: HOSPITAL | Age: 89
LOS: 1 days | Discharge: HOME | DRG: 202 | End: 2024-05-16
Attending: INTERNAL MEDICINE | Admitting: STUDENT IN AN ORGANIZED HEALTH CARE EDUCATION/TRAINING PROGRAM
Payer: MEDICARE

## 2024-05-15 VITALS
HEART RATE: 67 BPM | SYSTOLIC BLOOD PRESSURE: 117 MMHG | RESPIRATION RATE: 18 BRPM | TEMPERATURE: 97.2 F | BODY MASS INDEX: 24.99 KG/M2 | WEIGHT: 150 LBS | HEIGHT: 65 IN | DIASTOLIC BLOOD PRESSURE: 73 MMHG | OXYGEN SATURATION: 98 %

## 2024-05-15 DIAGNOSIS — R05.1 ACUTE COUGH: ICD-10-CM

## 2024-05-15 DIAGNOSIS — J20.9 ACUTE BRONCHITIS, UNSPECIFIED ORGANISM: ICD-10-CM

## 2024-05-15 DIAGNOSIS — R06.02 SOB (SHORTNESS OF BREATH): Primary | ICD-10-CM

## 2024-05-15 DIAGNOSIS — R53.1 GENERALIZED WEAKNESS: ICD-10-CM

## 2024-05-15 DIAGNOSIS — J90 PLEURAL EFFUSION: ICD-10-CM

## 2024-05-15 DIAGNOSIS — N17.9 AKI (ACUTE KIDNEY INJURY) (CMS-HCC): ICD-10-CM

## 2024-05-15 DIAGNOSIS — E87.6 HYPOKALEMIA: Primary | ICD-10-CM

## 2024-05-15 DIAGNOSIS — E83.42 HYPOMAGNESEMIA: ICD-10-CM

## 2024-05-15 LAB
ALBUMIN SERPL BCP-MCNC: 3.1 G/DL (ref 3.4–5)
ALP SERPL-CCNC: 103 U/L (ref 33–136)
ALT SERPL W P-5'-P-CCNC: 28 U/L (ref 10–52)
ANION GAP BLDV CALCULATED.4IONS-SCNC: 10 MMOL/L (ref 10–25)
ANION GAP SERPL CALC-SCNC: 11 MMOL/L (ref 10–20)
AST SERPL W P-5'-P-CCNC: 25 U/L (ref 9–39)
BASE EXCESS BLDV CALC-SCNC: 5.9 MMOL/L (ref -2–3)
BASOPHILS # BLD AUTO: 0.06 X10*3/UL (ref 0–0.1)
BASOPHILS NFR BLD AUTO: 1.3 %
BILIRUB SERPL-MCNC: 0.8 MG/DL (ref 0–1.2)
BNP SERPL-MCNC: 274 PG/ML (ref 0–99)
BODY TEMPERATURE: ABNORMAL
BUN SERPL-MCNC: 54 MG/DL (ref 6–23)
CA-I BLDV-SCNC: 1.18 MMOL/L (ref 1.1–1.33)
CALCIUM SERPL-MCNC: 8.8 MG/DL (ref 8.6–10.3)
CARDIAC TROPONIN I PNL SERPL HS: 24 NG/L (ref 0–20)
CARDIAC TROPONIN I PNL SERPL HS: 25 NG/L (ref 0–20)
CHLORIDE BLDV-SCNC: 97 MMOL/L (ref 98–107)
CHLORIDE SERPL-SCNC: 97 MMOL/L (ref 98–107)
CO2 SERPL-SCNC: 30 MMOL/L (ref 21–32)
CREAT SERPL-MCNC: 2.05 MG/DL (ref 0.5–1.3)
CRP SERPL-MCNC: 1.18 MG/DL
EGFRCR SERPLBLD CKD-EPI 2021: 30 ML/MIN/1.73M*2
EOSINOPHIL # BLD AUTO: 0.24 X10*3/UL (ref 0–0.4)
EOSINOPHIL NFR BLD AUTO: 5.2 %
ERYTHROCYTE [DISTWIDTH] IN BLOOD BY AUTOMATED COUNT: 13.7 % (ref 11.5–14.5)
GLUCOSE BLD MANUAL STRIP-MCNC: 338 MG/DL (ref 74–99)
GLUCOSE BLD MANUAL STRIP-MCNC: 347 MG/DL (ref 74–99)
GLUCOSE BLDV-MCNC: 196 MG/DL (ref 74–99)
GLUCOSE SERPL-MCNC: 194 MG/DL (ref 74–99)
HCO3 BLDV-SCNC: 31.2 MMOL/L (ref 22–26)
HCT VFR BLD AUTO: 33.2 % (ref 41–52)
HCT VFR BLD EST: 36 % (ref 41–52)
HGB BLD-MCNC: 11.4 G/DL (ref 13.5–17.5)
HGB BLDV-MCNC: 12.1 G/DL (ref 13.5–17.5)
IMM GRANULOCYTES # BLD AUTO: 0.01 X10*3/UL (ref 0–0.5)
IMM GRANULOCYTES NFR BLD AUTO: 0.2 % (ref 0–0.9)
INHALED O2 CONCENTRATION: 0 %
LACTATE BLDV-SCNC: 0.9 MMOL/L (ref 0.4–2)
LACTATE SERPL-SCNC: 0.8 MMOL/L (ref 0.4–2)
LYMPHOCYTES # BLD AUTO: 1.34 X10*3/UL (ref 0.8–3)
LYMPHOCYTES NFR BLD AUTO: 28.8 %
MAGNESIUM SERPL-MCNC: 1.38 MG/DL (ref 1.6–2.4)
MCH RBC QN AUTO: 31.8 PG (ref 26–34)
MCHC RBC AUTO-ENTMCNC: 34.3 G/DL (ref 32–36)
MCV RBC AUTO: 93 FL (ref 80–100)
MONOCYTES # BLD AUTO: 0.68 X10*3/UL (ref 0.05–0.8)
MONOCYTES NFR BLD AUTO: 14.6 %
NEUTROPHILS # BLD AUTO: 2.32 X10*3/UL (ref 1.6–5.5)
NEUTROPHILS NFR BLD AUTO: 49.9 %
NRBC BLD-RTO: 0 /100 WBCS (ref 0–0)
OXYHGB MFR BLDV: 63.7 % (ref 45–75)
PCO2 BLDV: 47 MM HG (ref 41–51)
PH BLDV: 7.43 PH (ref 7.33–7.43)
PLATELET # BLD AUTO: 184 X10*3/UL (ref 150–450)
PO2 BLDV: 45 MM HG (ref 35–45)
POTASSIUM BLDV-SCNC: 3.5 MMOL/L (ref 3.5–5.3)
POTASSIUM SERPL-SCNC: 3.3 MMOL/L (ref 3.5–5.3)
PROT SERPL-MCNC: 7.5 G/DL (ref 6.4–8.2)
RBC # BLD AUTO: 3.59 X10*6/UL (ref 4.5–5.9)
SAO2 % BLDV: 65 % (ref 45–75)
SODIUM BLDV-SCNC: 135 MMOL/L (ref 136–145)
SODIUM SERPL-SCNC: 135 MMOL/L (ref 136–145)
WBC # BLD AUTO: 4.7 X10*3/UL (ref 4.4–11.3)

## 2024-05-15 PROCEDURE — 87449 NOS EACH ORGANISM AG IA: CPT | Performed by: INTERNAL MEDICINE

## 2024-05-15 PROCEDURE — 86140 C-REACTIVE PROTEIN: CPT | Performed by: INTERNAL MEDICINE

## 2024-05-15 PROCEDURE — 2500000001 HC RX 250 WO HCPCS SELF ADMINISTERED DRUGS (ALT 637 FOR MEDICARE OP): Performed by: STUDENT IN AN ORGANIZED HEALTH CARE EDUCATION/TRAINING PROGRAM

## 2024-05-15 PROCEDURE — 36415 COLL VENOUS BLD VENIPUNCTURE: CPT | Performed by: EMERGENCY MEDICINE

## 2024-05-15 PROCEDURE — 83735 ASSAY OF MAGNESIUM: CPT | Performed by: EMERGENCY MEDICINE

## 2024-05-15 PROCEDURE — 71275 CT ANGIOGRAPHY CHEST: CPT

## 2024-05-15 PROCEDURE — 86645 CMV ANTIBODY IGM: CPT | Performed by: INTERNAL MEDICINE

## 2024-05-15 PROCEDURE — 87305 ASPERGILLUS AG IA: CPT | Performed by: INTERNAL MEDICINE

## 2024-05-15 PROCEDURE — 82947 ASSAY GLUCOSE BLOOD QUANT: CPT

## 2024-05-15 PROCEDURE — 2500000004 HC RX 250 GENERAL PHARMACY W/ HCPCS (ALT 636 FOR OP/ED): Performed by: EMERGENCY MEDICINE

## 2024-05-15 PROCEDURE — 84132 ASSAY OF SERUM POTASSIUM: CPT | Mod: 59,91 | Performed by: EMERGENCY MEDICINE

## 2024-05-15 PROCEDURE — 1100000001 HC PRIVATE ROOM DAILY

## 2024-05-15 PROCEDURE — 84145 PROCALCITONIN (PCT): CPT | Mod: GEALAB | Performed by: INTERNAL MEDICINE

## 2024-05-15 PROCEDURE — 84484 ASSAY OF TROPONIN QUANT: CPT | Mod: 91 | Performed by: EMERGENCY MEDICINE

## 2024-05-15 PROCEDURE — 93005 ELECTROCARDIOGRAM TRACING: CPT

## 2024-05-15 PROCEDURE — 84484 ASSAY OF TROPONIN QUANT: CPT | Performed by: EMERGENCY MEDICINE

## 2024-05-15 PROCEDURE — 9420000001 HC RT PATIENT EDUCATION 5 MIN

## 2024-05-15 PROCEDURE — 94640 AIRWAY INHALATION TREATMENT: CPT

## 2024-05-15 PROCEDURE — 94667 MNPJ CHEST WALL 1ST: CPT

## 2024-05-15 PROCEDURE — 85025 COMPLETE CBC W/AUTO DIFF WBC: CPT | Performed by: EMERGENCY MEDICINE

## 2024-05-15 PROCEDURE — 2550000001 HC RX 255 CONTRASTS: Performed by: EMERGENCY MEDICINE

## 2024-05-15 PROCEDURE — 2500000002 HC RX 250 W HCPCS SELF ADMINISTERED DRUGS (ALT 637 FOR MEDICARE OP, ALT 636 FOR OP/ED): Mod: MUE | Performed by: STUDENT IN AN ORGANIZED HEALTH CARE EDUCATION/TRAINING PROGRAM

## 2024-05-15 PROCEDURE — 84132 ASSAY OF SERUM POTASSIUM: CPT | Mod: 91 | Performed by: EMERGENCY MEDICINE

## 2024-05-15 PROCEDURE — 87040 BLOOD CULTURE FOR BACTERIA: CPT | Mod: 91,GENLAB | Performed by: EMERGENCY MEDICINE

## 2024-05-15 PROCEDURE — 2500000004 HC RX 250 GENERAL PHARMACY W/ HCPCS (ALT 636 FOR OP/ED): Performed by: STUDENT IN AN ORGANIZED HEALTH CARE EDUCATION/TRAINING PROGRAM

## 2024-05-15 PROCEDURE — 94664 DEMO&/EVAL PT USE INHALER: CPT

## 2024-05-15 PROCEDURE — 99223 1ST HOSP IP/OBS HIGH 75: CPT | Performed by: STUDENT IN AN ORGANIZED HEALTH CARE EDUCATION/TRAINING PROGRAM

## 2024-05-15 PROCEDURE — 99285 EMERGENCY DEPT VISIT HI MDM: CPT | Mod: 25

## 2024-05-15 PROCEDURE — 86481 TB AG RESPONSE T-CELL SUSP: CPT | Performed by: INTERNAL MEDICINE

## 2024-05-15 PROCEDURE — 2500000006 HC RX 250 W HCPCS SELF ADMINISTERED DRUGS (ALT 637 FOR ALL PAYERS): Performed by: EMERGENCY MEDICINE

## 2024-05-15 PROCEDURE — 83605 ASSAY OF LACTIC ACID: CPT | Performed by: EMERGENCY MEDICINE

## 2024-05-15 PROCEDURE — 83880 ASSAY OF NATRIURETIC PEPTIDE: CPT | Performed by: EMERGENCY MEDICINE

## 2024-05-15 PROCEDURE — 71275 CT ANGIOGRAPHY CHEST: CPT | Mod: FOREIGN READ | Performed by: RADIOLOGY

## 2024-05-15 RX ORDER — FAMOTIDINE 20 MG/1
20 TABLET, FILM COATED ORAL 2 TIMES DAILY
Status: DISCONTINUED | OUTPATIENT
Start: 2024-05-15 | End: 2024-05-16 | Stop reason: HOSPADM

## 2024-05-15 RX ORDER — AMLODIPINE BESYLATE 5 MG/1
5 TABLET ORAL DAILY
Status: DISCONTINUED | OUTPATIENT
Start: 2024-05-15 | End: 2024-05-16 | Stop reason: HOSPADM

## 2024-05-15 RX ORDER — DEXTROSE 50 % IN WATER (D50W) INTRAVENOUS SYRINGE
25
Status: DISCONTINUED | OUTPATIENT
Start: 2024-05-15 | End: 2024-05-16 | Stop reason: HOSPADM

## 2024-05-15 RX ORDER — PANTOPRAZOLE SODIUM 40 MG/1
40 TABLET, DELAYED RELEASE ORAL DAILY
Status: DISCONTINUED | OUTPATIENT
Start: 2024-05-16 | End: 2024-05-16 | Stop reason: HOSPADM

## 2024-05-15 RX ORDER — LANOLIN ALCOHOL/MO/W.PET/CERES
400 CREAM (GRAM) TOPICAL ONCE
Status: COMPLETED | OUTPATIENT
Start: 2024-05-15 | End: 2024-05-15

## 2024-05-15 RX ORDER — LENALIDOMIDE 2.5 MG/1
2.5 CAPSULE ORAL DAILY
Status: DISCONTINUED | OUTPATIENT
Start: 2024-05-16 | End: 2024-05-16 | Stop reason: HOSPADM

## 2024-05-15 RX ORDER — IPRATROPIUM BROMIDE AND ALBUTEROL SULFATE 2.5; .5 MG/3ML; MG/3ML
3 SOLUTION RESPIRATORY (INHALATION)
Status: DISCONTINUED | OUTPATIENT
Start: 2024-05-16 | End: 2024-05-16 | Stop reason: HOSPADM

## 2024-05-15 RX ORDER — IPRATROPIUM BROMIDE AND ALBUTEROL SULFATE 2.5; .5 MG/3ML; MG/3ML
3 SOLUTION RESPIRATORY (INHALATION)
Status: DISCONTINUED | OUTPATIENT
Start: 2024-05-15 | End: 2024-05-15

## 2024-05-15 RX ORDER — CARVEDILOL 25 MG/1
25 TABLET ORAL
Status: DISCONTINUED | OUTPATIENT
Start: 2024-05-15 | End: 2024-05-16 | Stop reason: HOSPADM

## 2024-05-15 RX ORDER — DEXTROSE 50 % IN WATER (D50W) INTRAVENOUS SYRINGE
12.5
Status: DISCONTINUED | OUTPATIENT
Start: 2024-05-15 | End: 2024-05-16 | Stop reason: HOSPADM

## 2024-05-15 RX ORDER — POTASSIUM CHLORIDE 20 MEQ/1
20 TABLET, EXTENDED RELEASE ORAL ONCE
Status: COMPLETED | OUTPATIENT
Start: 2024-05-15 | End: 2024-05-15

## 2024-05-15 RX ORDER — IPRATROPIUM BROMIDE AND ALBUTEROL SULFATE 2.5; .5 MG/3ML; MG/3ML
3 SOLUTION RESPIRATORY (INHALATION) EVERY 2 HOUR PRN
Status: DISCONTINUED | OUTPATIENT
Start: 2024-05-15 | End: 2024-05-16 | Stop reason: HOSPADM

## 2024-05-15 RX ORDER — FERROUS SULFATE 325(65) MG
1 TABLET ORAL
Status: DISCONTINUED | OUTPATIENT
Start: 2024-05-16 | End: 2024-05-16 | Stop reason: HOSPADM

## 2024-05-15 RX ORDER — LEVOTHYROXINE SODIUM 25 UG/1
25 TABLET ORAL
Status: DISCONTINUED | OUTPATIENT
Start: 2024-05-15 | End: 2024-05-16 | Stop reason: HOSPADM

## 2024-05-15 RX ORDER — ASPIRIN 81 MG/1
81 TABLET ORAL DAILY
Status: DISCONTINUED | OUTPATIENT
Start: 2024-05-15 | End: 2024-05-16 | Stop reason: HOSPADM

## 2024-05-15 RX ORDER — ATORVASTATIN CALCIUM 20 MG/1
20 TABLET, FILM COATED ORAL NIGHTLY
Status: DISCONTINUED | OUTPATIENT
Start: 2024-05-15 | End: 2024-05-16 | Stop reason: HOSPADM

## 2024-05-15 RX ORDER — INSULIN LISPRO 100 [IU]/ML
0-15 INJECTION, SOLUTION INTRAVENOUS; SUBCUTANEOUS
Status: DISCONTINUED | OUTPATIENT
Start: 2024-05-15 | End: 2024-05-16 | Stop reason: HOSPADM

## 2024-05-15 RX ORDER — ACYCLOVIR 800 MG/1
400 TABLET ORAL DAILY
Status: DISCONTINUED | OUTPATIENT
Start: 2024-05-16 | End: 2024-05-16

## 2024-05-15 RX ADMIN — Medication 400 MG: at 10:41

## 2024-05-15 RX ADMIN — ATORVASTATIN CALCIUM 20 MG: 20 TABLET, FILM COATED ORAL at 21:54

## 2024-05-15 RX ADMIN — POTASSIUM CHLORIDE 20 MEQ: 1500 TABLET, EXTENDED RELEASE ORAL at 10:41

## 2024-05-15 RX ADMIN — IPRATROPIUM BROMIDE AND ALBUTEROL SULFATE 3 ML: 2.5; .5 SOLUTION RESPIRATORY (INHALATION) at 20:46

## 2024-05-15 RX ADMIN — IOHEXOL 61 ML: 350 INJECTION, SOLUTION INTRAVENOUS at 10:21

## 2024-05-15 RX ADMIN — FAMOTIDINE 20 MG: 20 TABLET, FILM COATED ORAL at 21:53

## 2024-05-15 RX ADMIN — LEVOTHYROXINE SODIUM 25 MCG: 25 TABLET ORAL at 22:00

## 2024-05-15 RX ADMIN — CARVEDILOL 25 MG: 25 TABLET, FILM COATED ORAL at 21:53

## 2024-05-15 RX ADMIN — METHYLPREDNISOLONE SODIUM SUCCINATE 40 MG: 40 INJECTION, POWDER, FOR SOLUTION INTRAMUSCULAR; INTRAVENOUS at 21:54

## 2024-05-15 RX ADMIN — INSULIN LISPRO 12 UNITS: 100 INJECTION, SOLUTION INTRAVENOUS; SUBCUTANEOUS at 18:39

## 2024-05-15 RX ADMIN — AMLODIPINE BESYLATE 5 MG: 5 TABLET ORAL at 21:54

## 2024-05-15 SDOH — SOCIAL STABILITY: SOCIAL INSECURITY: ARE YOU OR HAVE YOU BEEN THREATENED OR ABUSED PHYSICALLY, EMOTIONALLY, OR SEXUALLY BY ANYONE?: NO

## 2024-05-15 SDOH — SOCIAL STABILITY: SOCIAL INSECURITY: DOES ANYONE TRY TO KEEP YOU FROM HAVING/CONTACTING OTHER FRIENDS OR DOING THINGS OUTSIDE YOUR HOME?: NO

## 2024-05-15 SDOH — SOCIAL STABILITY: SOCIAL INSECURITY: WERE YOU ABLE TO COMPLETE ALL THE BEHAVIORAL HEALTH SCREENINGS?: YES

## 2024-05-15 SDOH — SOCIAL STABILITY: SOCIAL INSECURITY: DO YOU FEEL UNSAFE GOING BACK TO THE PLACE WHERE YOU ARE LIVING?: NO

## 2024-05-15 SDOH — SOCIAL STABILITY: SOCIAL INSECURITY: HAVE YOU HAD THOUGHTS OF HARMING ANYONE ELSE?: NO

## 2024-05-15 SDOH — SOCIAL STABILITY: SOCIAL INSECURITY: DO YOU FEEL ANYONE HAS EXPLOITED OR TAKEN ADVANTAGE OF YOU FINANCIALLY OR OF YOUR PERSONAL PROPERTY?: NO

## 2024-05-15 SDOH — SOCIAL STABILITY: SOCIAL INSECURITY: ARE THERE ANY APPARENT SIGNS OF INJURIES/BEHAVIORS THAT COULD BE RELATED TO ABUSE/NEGLECT?: NO

## 2024-05-15 SDOH — SOCIAL STABILITY: SOCIAL INSECURITY: ABUSE: ADULT

## 2024-05-15 SDOH — SOCIAL STABILITY: SOCIAL INSECURITY: HAS ANYONE EVER THREATENED TO HURT YOUR FAMILY OR YOUR PETS?: NO

## 2024-05-15 SDOH — SOCIAL STABILITY: SOCIAL INSECURITY: HAVE YOU HAD ANY THOUGHTS OF HARMING ANYONE ELSE?: NO

## 2024-05-15 ASSESSMENT — LIFESTYLE VARIABLES
HOW MANY STANDARD DRINKS CONTAINING ALCOHOL DO YOU HAVE ON A TYPICAL DAY: PATIENT DOES NOT DRINK
HOW OFTEN DO YOU HAVE A DRINK CONTAINING ALCOHOL: NEVER
SKIP TO QUESTIONS 9-10: 1
HOW OFTEN DO YOU HAVE 6 OR MORE DRINKS ON ONE OCCASION: NEVER
AUDIT-C TOTAL SCORE: 0
AUDIT-C TOTAL SCORE: 0

## 2024-05-15 ASSESSMENT — COGNITIVE AND FUNCTIONAL STATUS - GENERAL
DAILY ACTIVITIY SCORE: 24
MOBILITY SCORE: 24
PATIENT BASELINE BEDBOUND: NO
MOBILITY SCORE: 24
DAILY ACTIVITIY SCORE: 24

## 2024-05-15 ASSESSMENT — ENCOUNTER SYMPTOMS
FEVER: 0
COUGH: 1
CHILLS: 0
APPETITE CHANGE: 0
ABDOMINAL PAIN: 0
ACTIVITY CHANGE: 1
FATIGUE: 1
SHORTNESS OF BREATH: 1
HEADACHES: 0

## 2024-05-15 ASSESSMENT — ACTIVITIES OF DAILY LIVING (ADL)
DRESSING YOURSELF: INDEPENDENT
HEARING - RIGHT EAR: FUNCTIONAL
FEEDING YOURSELF: INDEPENDENT
PATIENT'S MEMORY ADEQUATE TO SAFELY COMPLETE DAILY ACTIVITIES?: YES
ASSISTIVE_DEVICE: EYEGLASSES
BATHING: INDEPENDENT
LACK_OF_TRANSPORTATION: NO
TOILETING: INDEPENDENT
WALKS IN HOME: INDEPENDENT
ADEQUATE_TO_COMPLETE_ADL: YES
GROOMING: INDEPENDENT
JUDGMENT_ADEQUATE_SAFELY_COMPLETE_DAILY_ACTIVITIES: YES
HEARING - LEFT EAR: FUNCTIONAL

## 2024-05-15 ASSESSMENT — COLUMBIA-SUICIDE SEVERITY RATING SCALE - C-SSRS
2. HAVE YOU ACTUALLY HAD ANY THOUGHTS OF KILLING YOURSELF?: NO
6. HAVE YOU EVER DONE ANYTHING, STARTED TO DO ANYTHING, OR PREPARED TO DO ANYTHING TO END YOUR LIFE?: NO
6. HAVE YOU EVER DONE ANYTHING, STARTED TO DO ANYTHING, OR PREPARED TO DO ANYTHING TO END YOUR LIFE?: NO
2. HAVE YOU ACTUALLY HAD ANY THOUGHTS OF KILLING YOURSELF?: NO
1. IN THE PAST MONTH, HAVE YOU WISHED YOU WERE DEAD OR WISHED YOU COULD GO TO SLEEP AND NOT WAKE UP?: NO
1. IN THE PAST MONTH, HAVE YOU WISHED YOU WERE DEAD OR WISHED YOU COULD GO TO SLEEP AND NOT WAKE UP?: NO

## 2024-05-15 ASSESSMENT — PAIN SCALES - GENERAL
PAINLEVEL_OUTOF10: 0 - NO PAIN

## 2024-05-15 ASSESSMENT — PATIENT HEALTH QUESTIONNAIRE - PHQ9
1. LITTLE INTEREST OR PLEASURE IN DOING THINGS: NOT AT ALL
SUM OF ALL RESPONSES TO PHQ9 QUESTIONS 1 & 2: 0
2. FEELING DOWN, DEPRESSED OR HOPELESS: NOT AT ALL

## 2024-05-15 ASSESSMENT — PAIN - FUNCTIONAL ASSESSMENT: PAIN_FUNCTIONAL_ASSESSMENT: 0-10

## 2024-05-15 NOTE — ED NOTES
Constance HUNTER [place] [id number], [facility] just called and said 15 min     Margarita Rosado, EMT  05/15/24 [id number]

## 2024-05-15 NOTE — ED NOTES
Constance HUNTER [place] [id number], [facility] just called and said 15 min, [phone]     Margarita Rosado, EMT  05/15/24 0094

## 2024-05-15 NOTE — H&P
History and Physical Note  Patient: Shady Becerril   Age: 89 y.o. Gender: male     History of Present Illness:   Admission Reason: No chief complaint on file.    HPI:   Shady Becerril is a 89 y.o. year old male with PMH HFpEF, CAD sp CABG, A fib, HTN, low grade B-cell Non-Hodgkin Lymphoma on Revlimid 2.5 mg daily 21 days on and 7 days off presented to OSH for worsening SOB. He said he was just diagnosed with pneumonia 1 week PTA and had finished course of abx. He was also scheduled for thoracentesis for later this month. He continues to have to worsening SOB and non productive cough that he went to the hospital. He also feels very fatigue. Denies fever, chills, CP, abdominal pain, N/V/D.     Review of Systems:  Review of Systems   Constitutional:  Positive for activity change and fatigue. Negative for appetite change, chills and fever.   Respiratory:  Positive for cough and shortness of breath.    Cardiovascular:  Negative for chest pain.   Gastrointestinal:  Negative for abdominal pain.   Neurological:  Negative for headaches.   All other systems reviewed and are negative.      Allergies:   No Known Allergies    Past Medical History:  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    Stenosis of both lacrimal ducts    Acquired stenosis of left nasolacrimal duct 06/01/2021    Stenosis of left lacrimal duct    Body mass index (BMI) 31.0-31.9, adult 03/02/2020    Body mass index (BMI) of 31.0 to 31.9 in adult    Disorder of the skin and subcutaneous tissue, unspecified     Skin lesion of face    Hepatomegaly, not elsewhere classified 04/01/2020    Liver mass    Low grade B-cell lymphoma (Multi)     Multiple myeloma (Multi)     Noninfective gastroenteritis and colitis, unspecified  2020    Enteritis    Ocular laceration without prolapse or loss of intraocular tissue, unspecified eye, initial encounter 02/15/2021    Eye laceration    Other abnormal findings in specimens from other organs, systems and tissues 2020    Abnormal bone marrow examination    Other conditions influencing health status 2020    Transition of care    Personal history of other diseases of the circulatory system     History of hypotension    Personal history of other diseases of the circulatory system 2020    History of orthostatic hypotension    Personal history of other diseases of the musculoskeletal system and connective tissue 03/10/2020    History of back pain    Personal history of other infectious and parasitic diseases 10/19/2020    History of candidiasis of mouth    Personal history of other specified conditions 10/20/2020    History of weight loss    Shortness of breath 2020    SOB (shortness of breath) on exertion    Unsteadiness on feet 2020    General unsteadiness on examination    Unsteadiness on feet 2020    Unsteadiness       Past Surgical History:   No past surgical history on file.    Family History:  No family history on file.    Social History:  Social History     Tobacco Use   Smoking Status Former    Types: Pipe    Quit date: 1980    Years since quittin.4   Smokeless Tobacco Never       Social History     Substance and Sexual Activity   Alcohol Use Not Currently       Social History     Substance and Sexual Activity   Drug Use Never       Home Medications:  Current Outpatient Medications   Medication Instructions    acyclovir (ZOVIRAX) 400 mg, oral, Daily    albuterol 2.5 mg, inhalation    amLODIPine (NORVASC) 5 mg, oral, Daily    aspirin 81 mg EC tablet 1 tablet, oral, Daily    atorvastatin (LIPITOR) 20 mg, oral, Nightly    blood sugar diagnostic (OneTouch Verio test strips) strip 1 strip 1 STRIP TO CHECK GLUCOSE THREE TIMES DAILY  (route:  "miscellaneous)    carvedilol (Coreg) 25 mg tablet TAKE 1 TABLET BY MOUTH TWICE DAILY WITH MEALS    famotidine (Pepcid) 20 mg tablet 1 tablet, oral, 2 times daily    ferrous sulfate (325 mg ferrous sulfate) 325 mg, oral, Daily with breakfast    insulin NPH and regular human (HumuLIN 70-30, NovoLIN 70-30) 100 unit/mL (70-30) injection 10 Units, subcutaneous, 2 times daily before meals, Take as directed per insulin instructions.    lancets (OneTouch Delica Plus Lancet) 30 gauge misc USE 1 TO CHECK GLUCOSE THREE TIMES DAILY AS DIRECTED    lenalidomide (Revlimid) 2.5 mg capsule TAKE 1 CAPSULE BY MOUTH DAILY  FOR 21 DAYS, THEN 7 DAYS OFF    levothyroxine (SYNTHROID, LEVOXYL) 25 mcg, oral, Daily    mupirocin (Bactroban) 2 % ointment APPLY 1 APPLICATION TOPICALLY TO AFFECTED AREA THREE TIMES DAILY    nitroglycerin (NITROSTAT) 0.4 mg, sublingual    ONETOUCH DELICA LANCETS MISC USE 1  TO CHECK GLUCOSE 3 TIMES DAILY AS DIRECTED  e11.65    OneTouch Verio test strips strip USE 1 STRIP TO CHECK GLUCOSE THREE TIMES DAILY    pantoprazole (PROTONIX) 40 mg, oral, Daily    potassium chloride CR 10 mEq ER tablet 1 tablet, oral, 2 times daily       Vitals:  Visit Vitals  /53 (BP Location: Right arm, Patient Position: Sitting)   Pulse 68   Temp 36.6 °C (97.9 °F) (Temporal)   Resp 19   Ht 1.651 m (5' 5\")   Wt 68 kg (149 lb 14.6 oz)   SpO2 94%   BMI 24.95 kg/m²   Smoking Status Former   BSA 1.77 m²       Physical Exam  Vitals and nursing note reviewed.   Constitutional:       General: He is not in acute distress.     Appearance: Normal appearance. He is not ill-appearing or toxic-appearing.   HENT:      Head: Normocephalic and atraumatic.      Nose: Nose normal.      Mouth/Throat:      Mouth: Mucous membranes are moist.   Eyes:      Extraocular Movements: Extraocular movements intact.      Conjunctiva/sclera: Conjunctivae normal.      Pupils: Pupils are equal, round, and reactive to light.   Cardiovascular:      Rate and Rhythm: " Normal rate and regular rhythm.      Heart sounds: No murmur heard.     No gallop.   Pulmonary:      Effort: No respiratory distress.      Breath sounds: Wheezing and rhonchi present. No rales.      Comments: + conversational dyspnea with frequent non productive coughs  Abdominal:      General: Abdomen is flat. Bowel sounds are normal. There is no distension.      Palpations: Abdomen is soft. There is no mass.      Tenderness: There is no abdominal tenderness.   Musculoskeletal:         General: No swelling or tenderness. Normal range of motion.      Cervical back: Normal range of motion and neck supple.   Skin:     General: Skin is warm and dry.   Neurological:      General: No focal deficit present.      Mental Status: He is alert and oriented to person, place, and time. Mental status is at baseline.   Psychiatric:         Mood and Affect: Mood normal.         Behavior: Behavior normal.         Thought Content: Thought content normal.         Judgment: Judgment normal.         Labs and Imaging Results:  Lab Results   Component Value Date    WBC 4.7 05/15/2024       ECG 12 lead  Atrial fibrillation  Low voltage QRS  Nonspecific ST and T wave abnormality  Abnormal ECG  When compared with ECG of 12-MAY-2024 10:30, (unconfirmed)  Minimal criteria for Anteroseptal infarct are no longer Present  Nonspecific T wave abnormality, worse in Anterior leads  ECG 12 lead  Atrial fibrillation with a competing junctional pacemaker  Low voltage QRS  Nonspecific T wave abnormality  Abnormal ECG  When compared with ECG of 15-MAY-2024 07:51, (unconfirmed)  No significant change was found  CT angio chest for pulmonary embolism  Narrative: STUDY:  CT Angiogram of the Chest; 05/15/2024 10:22 AM  INDICATION:  Shortness of breath, hypoxia.  COMPARISON:  XR chest 05/12/2024, NM lung perfusion 10/31/2023.  ACCESSION NUMBER(S):  [id number]  ORDERING CLINICIAN:  ALDEN CASTORENA  TECHNIQUE:  CTA of the chest was performed with intravenous  contrast.   Images are reviewed and processed at a workstation according to the CT  angiogram protocol with 3-D and/or MIP post processing imaging  generated.  Omnipaque 350 61 mL was administered intravenously.  Automated mA/kV exposure control was utilized and patient examination  was performed in strict accordance with principles of ALARA.  FINDINGS:  Pulmonary arteries are adequately opacified without acute or chronic  filling defects.  The thoracic aorta is normal in course and caliber  without dissection or aneurysm.  The heart is normal in size without pericardial effusion.  Severe  coronary artery calcifications.  Thoracic lymph nodes are not  enlarged.  Right-sided [id number].  Small left pleural effusion.  Diffuse central bronchial wall  thickening, worst in the lower lobes with some associated mucous  plugging.  Biapical pleural-parenchymal scarring mild scattered bilateral areas  of bronchiolitis.  Scattered calcified granulomata.  Gallbladder is present without gallstones.  Bilateral renal cysts  measuring up to 3.4 cm on the right.  There are no acute fractures.  No suspicious bony lesions.  Impression: 1. No pulmonary embolism.  2. Diffuse central bronchial wall thickening, worst in the lower lobes  with some associated mucous plugging. Findings suggestive of  bronchitis.  3. Mild scattered bilateral areas of bronchiolitis.  4. Small left pleural effusion.  5. Severe coronary artery calcifications.    Signed by Ivan Awad MD      Assessment and Plan:    Principal Problem:    SOB (shortness of breath)      Patient Active Problem List   Diagnosis    PUD (peptic ulcer disease)    Coronary artery arteriosclerosis    Diabetes mellitus type 2 without retinopathy (Multi)    Benign essential hypertension    Bilateral epiphora    BPH (benign prostatic hyperplasia)    Congestive heart failure (Multi)    Diabetes mellitus (Multi)    Diabetic neuropathy (Multi)    Dyslipidemia    Gastrointestinal bleed     Hyperopia of both eyes    Hypertension    Hypotension    Hypothyroid    LL (lymphoblastic lymphoma) (Multi)    Lower back pain    Punctal ectropion    Senile ectropion of both lower eyelids    Stage 3a chronic kidney disease (Multi)    Vitamin D deficiency    Acute on chronic diastolic congestive heart failure (Multi)    Atrial flutter (Multi)    Heart murmur    History of cardiac cath    Hx of CABG    Lymphoplasmacytic lymphoma (Multi)    Multiple myeloma (Multi)    Platelets decreased (CMS-HCC)    Port-A-Cath in place    Urge incontinence    Urinary frequency    BPH with obstruction/lower urinary tract symptoms    Diabetes mellitus type 2, controlled, without complications (Multi)    Lymphoma of paranasal sinus (Multi)    CKD (chronic kidney disease) stage 3, GFR 30-59 ml/min (Multi)    Chronic bronchitis, unspecified chronic bronchitis type (Multi)    Pleural effusion on left    Hypercholesteremia    PAF (paroxysmal atrial fibrillation) (Multi)    Pneumonia, unspecified organism    SOB (shortness of breath)     SOB 2/2 bronchitis, left pleural effusion  Recently treated with abx for pneumonia  Still at risk for atypical infections given immunocompromised state  - admit to med/surg  - pulm consult  - duoneb  - IV steroids  - ID consult  - procal   - defer to abx to ID    CHARLENE/CKD  - monitor    HypoK/HypoMg  - replaced    Elevated trop 2/2 demand ischemia  - cardio consulted  - already on statin    Normocytic anemia  - monitor    DM  - NPH  - ISS    HTN  - norvasc  - coreg    CAD sp CABG  - statin  - ASA  - coreg    Hypothyroid  - Synthroid    Low B cell  - revlimid  - acyclovir for ppx    GERD  - protonix    Diet: diabetic  DVT ppx: SCD  Code status: DNR CCA/DNI, confirmed with pt  Dispo: admit under inpatient status anticipating greater than forty-eight hours or two midnights stay.     Amber Medley MD  Hospitalist

## 2024-05-15 NOTE — ED PROVIDER NOTES
HPI   Chief Complaint   Patient presents with    Cough     Pt in with increased cough and fatigue, was recently admitted here and treated for pneumonia, pt feels like he is getting worse, despite taking cough medicine he does not feel it is improving and is not getting much sleep, pt denies chest pain, no acute distress noted however pt has persistent cough       HPI  Patient is a 89-year-old male with history of CHF and recent pneumonia, presenting to the ED for worsening cough, shortness of breath, fatigue, and generalized weakness.  Patient was diagnosed with pneumonia about 1 week ago.  He was admitted here to the hospital and discharged home 3 days later.  He has since finished his course of antibiotics.  Since then however, he has returned to the ED twice now for continuing cough, shortness of breath.  He was discharged home 3 days ago, but comes back today for generalized weakness stating that he has been having difficulty getting out of bed and walking around due to his symptoms.  He notes that he has not slept at all over this time.  He further explains that he is scheduled for an outpatient thoracentesis at [facility].  They wanted to do it during his hospitalization, but they felt like it needed to be done at [facility] under radiology guidance.  Patient is otherwise on room air at baseline.  He does have a history of atrial fibrillation but is not on any anticoagulation due to a history of GI bleed.                  Floyd Coma Scale Score: 15                     Patient History   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    Stenosis of both lacrimal ducts    Acquired stenosis of left nasolacrimal duct 06/01/2021    Stenosis of left lacrimal duct    Body mass index (BMI) 31.0-31.9,  adult 2020    Body mass index (BMI) of 31.0 to 31.9 in adult    Disorder of the skin and subcutaneous tissue, unspecified     Skin lesion of face    Hepatomegaly, not elsewhere classified 2020    Liver mass    Low grade B-cell lymphoma (Multi)     Multiple myeloma (Multi)     Noninfective gastroenteritis and colitis, unspecified 2020    Enteritis    Ocular laceration without prolapse or loss of intraocular tissue, unspecified eye, initial encounter 02/15/2021    Eye laceration    Other abnormal findings in specimens from other organs, systems and tissues 2020    Abnormal bone marrow examination    Other conditions influencing health status 2020    Transition of care    Personal history of other diseases of the circulatory system     History of hypotension    Personal history of other diseases of the circulatory system 2020    History of orthostatic hypotension    Personal history of other diseases of the musculoskeletal system and connective tissue 03/10/2020    History of back pain    Personal history of other infectious and parasitic diseases 10/19/2020    History of candidiasis of mouth    Personal history of other specified conditions 10/20/2020    History of weight loss    Shortness of breath 2020    SOB (shortness of breath) on exertion    Unsteadiness on feet 2020    General unsteadiness on examination    Unsteadiness on feet 2020    Unsteadiness     No past surgical history on file.  No family history on file.  Social History     Tobacco Use    Smoking status: Former     Types: Pipe     Quit date: 1980     Years since quittin.4    Smokeless tobacco: Never   Vaping Use    Vaping status: Never Used   Substance Use Topics    Alcohol use: Not Currently    Drug use: Never       Physical Exam   ED Triage Vitals [05/15/24 0650]   Temperature Heart Rate Respirations BP   36.2 °C (97.2 °F) 69 16 119/60      Pulse Ox Temp Source Heart Rate Source Patient  Position   (!) 93 % Temporal Monitor Sitting      BP Location FiO2 (%)     Left arm --       Physical Exam  Vitals and nursing note reviewed.   Constitutional:       General: He is not in acute distress.     Appearance: He is not toxic-appearing.   HENT:      Head: Normocephalic.      Mouth/Throat:      Mouth: Mucous membranes are moist.   Eyes:      Extraocular Movements: Extraocular movements intact.      Conjunctiva/sclera: Conjunctivae normal.   Cardiovascular:      Rate and Rhythm: Normal rate and regular rhythm.      Pulses: Normal pulses.   Pulmonary:      Effort: Pulmonary effort is normal. No respiratory distress.      Breath sounds: Normal breath sounds. No wheezing.   Abdominal:      General: There is no distension.      Palpations: Abdomen is soft.      Tenderness: There is no abdominal tenderness.   Musculoskeletal:         General: No swelling.      Cervical back: Neck supple.   Skin:     General: Skin is warm and dry.      Capillary Refill: Capillary refill takes less than 2 seconds.   Neurological:      General: No focal deficit present.      Mental Status: He is alert. Mental status is at baseline.         ED Course & MDM   ED Course as of 05/15/24 1259   Wed May 15, 2024   0800 EKG obtained at 751, interpreted by myself.  Atrial fibrillation with a ventricular rate of 64, no axis deviation, otherwise normal intervals, with no acute ischemic changes.    Patient does have a history of atrial fibrillation, but is not on any anticoagulation due to history of GI bleed. [VT]   0940 EKG obtained at 0931, interpreted by myself.  Atrial fibrillation with a ventricular rate of 68, no axis deviation, normal intervals, with no acute ischemic changes [VT]      ED Course User Index  [VT] Cecelia ESCAMILLA MD         Diagnoses as of 05/15/24 1259   Hypokalemia   Hypomagnesemia   Acute cough   Generalized weakness   Acute bronchitis, unspecified organism   Pleural effusion   CHARLENE (acute kidney injury) (CMS-[facility])        Medical Decision Making  Patient was seen and evaluated for shortness of breath, cough, generalized weakness.  Differential diagnosis includes but is not limited to pneumonia, pneumothorax, COPD exacerbation, CHF exacerbation, PE, ACS, URI, Viral illness, Anemia, Electrolyte abnormality.  On arrival, patient is oxygenating at 93% on room air.  After sitting and resting however, patient's O2 improves to > 95%.  Patient is placed on a cardiac monitor with continuous pulse ox.   Additional labs and imaging are ordered for further evaluation of the patient's symptoms.    CMP shows mild hypokalemia and hypomagnesemia.  These are replaced orally.  Creatinine is elevated at 2.0.  This appears to be slowly climbing over the past week or so.  BNP is elevated at 274, improved from previous BNP of 362.  High sensitive troponin is stable in the 20s, which appears to be consistent with patient's baseline.  CT angio chest for pulmonary embolism   Final Result   1. No pulmonary embolism.   2. Diffuse central bronchial wall thickening, worst in the lower lobes   with some associated mucous plugging. Findings suggestive of   bronchitis.   3. Mild scattered bilateral areas of bronchiolitis.   4. Small left pleural effusion.   5. Severe coronary artery calcifications.     Signed by Ivan Awad MD        On reevaluation, patient is resting comfortably in bed.  He remains stable on room air.  We attempted to do an ambulatory pulse ox at this time, but patient felt like he was too weak and tired to ambulate.    Patient was informed of their lab and imaging results, and all questions and concerns were answered.  I informed the patient that the imaging of his pleural effusion appears to be smaller than previous.  At this time, they are still requesting transfer to [facility].  Therefore, I discussed the case with Dr. Blood, hospitalist at [facility], who accepts patient for transfer.    Procedure  Procedures     Cecelia Beverly V,  MD  05/15/24 4004

## 2024-05-15 NOTE — CONSULTS
Consults  Referred by CINTHYA Medley MD: Juliette Zazueta MD    Reason For Consult  pneumonia    History Of Present Illness  Shady Becerril is a 89 y.o. 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), Acquired stenosis of left nasolacrimal duct (06/01/2021), Body mass index (BMI) 31.0-31.9, adult (03/02/2020), Disorder of the skin and subcutaneous tissue, unspecified, Hepatomegaly, not elsewhere classified (04/01/2020), Low grade B-cell lymphoma (Multi), Multiple myeloma (Multi), Noninfective gastroenteritis and colitis, unspecified (08/31/2020), Ocular laceration without prolapse or loss of intraocular tissue, unspecified eye, initial encounter (02/15/2021), Other abnormal findings in specimens from other organs, systems and tissues (05/18/2020), Other conditions influencing health status (05/18/2020), Personal history of other diseases of the circulatory system, Personal history of other diseases of the circulatory system (05/05/2020), Personal history of other diseases of the musculoskeletal system and connective tissue (03/10/2020), Personal history of other infectious and parasitic diseases (10/19/2020), Personal history of other specified conditions (10/20/2020), Shortness of breath (03/19/2020), Unsteadiness on feet (05/06/2020), and Unsteadiness on feet (05/06/2020).    Surgical History  He has  no past surgical history on file.     Social History     Occupational History    Not on file   Tobacco Use    Smoking status: Former     Types: Pipe     Quit date: 1980     Years since quittin.4    Smokeless tobacco: Never   Vaping Use    Vaping status: Never Used   Substance and Sexual Activity    Alcohol use: Not Currently    Drug use: Never    Sexual activity: Defer     Travel History   Travel since 04/15/24    No documented travel since 04/15/24          Family History  No family history on file., no immunodeficiency  Allergies  Patient has no known allergies.     Immunization History   Administered Date(s) Administered    Flu vaccine, quadrivalent, high-dose, preservative free, age 65y+ (FLUZONE) 10/31/2023    Influenza Nasal, Unspecified 10/05/2010    Influenza, High Dose Seasonal, Preservative Free 10/22/2013, 10/07/2014, 2015, 10/04/2016, 10/17/2017, 10/02/2018, 10/28/2019, 2021, 2022    Influenza, Unspecified 10/05/2010    Influenza, seasonal, injectable 2012, 10/04/2016, 10/13/2020    Influenza, seasonal, injectable, preservative free 2015    Novel influenza-H1N1-09, preservative-free 01/15/2010    Pfizer COVID-19 vaccine, Fall , 12 years and older, (30mcg/0.3mL) 10/16/2023    Pfizer COVID-19 vaccine, bivalent, age 12 years and older (30 mcg/0.3 mL) 2022, 2023    Pfizer Gray Cap SARS-CoV-2 2022    Pfizer Purple Cap SARS-CoV-2 2021, 2021, 2021, 2022    Pneumococcal conjugate vaccine, 13-valent (PREVNAR 13) 11/10/2017    Pneumococcal polysaccharide vaccine, 23-valent, age 2 years and older (PNEUMOVAX 23) 10/01/2007    Pneumococcal, Unspecified 2017   Pneumonia vaccine is up to date  Handy fall risk 20, preventive protocol was implemented  Depression screen is negative    Medications  Home medications:  Medications Prior to Admission   Medication Sig Dispense Refill Last Dose    acyclovir (Zovirax) 400 mg tablet Take 1  tablet (400 mg) by mouth once daily.       albuterol 2.5 mg /3 mL (0.083 %) nebulizer solution Inhale 3 mL (2.5 mg).       amLODIPine (Norvasc) 5 mg tablet Take 1 tablet (5 mg) by mouth once daily.       aspirin 81 mg EC tablet Take 1 tablet (81 mg) by mouth once daily.       atorvastatin (Lipitor) 20 mg tablet Take 1 tablet (20 mg) by mouth once daily at bedtime.       blood sugar diagnostic (OneTouch Verio test strips) strip 1 strip 1 STRIP TO CHECK GLUCOSE THREE TIMES DAILY  (route: miscellaneous)       carvedilol (Coreg) 25 mg tablet TAKE 1 TABLET BY MOUTH TWICE DAILY WITH MEALS 180 tablet 1     [] cefuroxime (Ceftin) 500 mg tablet Take 1 tablet (500 mg) by mouth 2 times a day for 3 days. Take with food 6 tablet 0     famotidine (Pepcid) 20 mg tablet Take 1 tablet (20 mg) by mouth 2 times a day.       ferrous sulfate, 325 mg ferrous sulfate, tablet Take 1 tablet by mouth once daily with breakfast.       insulin NPH and regular human (HumuLIN 70-30, NovoLIN 70-30) 100 unit/mL (70-30) injection Inject 10 Units under the skin 2 times a day before meals. Take as directed per insulin instructions.       lancets (OneTouch Delica Plus Lancet) 30 gauge misc USE 1 TO CHECK GLUCOSE THREE TIMES DAILY AS DIRECTED 200 each 3     lenalidomide (Revlimid) 2.5 mg capsule TAKE 1 CAPSULE BY MOUTH DAILY  FOR 21 DAYS, THEN 7 DAYS OFF       levothyroxine (Synthroid, Levoxyl) 25 mcg tablet Take 1 tablet (25 mcg) by mouth once daily. 90 tablet 1     mupirocin (Bactroban) 2 % ointment APPLY 1 APPLICATION TOPICALLY TO AFFECTED AREA THREE TIMES DAILY       nitroglycerin (Nitrostat) 0.4 mg SL tablet Place 1 tablet (0.4 mg) under the tongue.       ONETOUCH DELICA LANCETS MISC USE 1  TO CHECK GLUCOSE 3 TIMES DAILY AS DIRECTED  e11.65       OneTouch Verio test strips strip USE 1 STRIP TO CHECK GLUCOSE THREE TIMES DAILY 100 strip 3     pantoprazole (ProtoNix) 40 mg EC tablet Take 1 tablet by mouth once daily 90 tablet 1     potassium  "chloride CR 10 mEq ER tablet Take 1 tablet (10 mEq) by mouth 2 times a day.        Current medications:  Scheduled medications  [START ON 5/16/2024] acyclovir, 400 mg, oral, Daily  amLODIPine, 5 mg, oral, Daily  aspirin, 81 mg, oral, Daily  atorvastatin, 20 mg, oral, Nightly  carvedilol, 25 mg, oral, BID  famotidine, 20 mg, oral, BID  [START ON 5/16/2024] ferrous sulfate (325 mg ferrous sulfate), 1 tablet, oral, Daily with breakfast  insulin lispro, 0-15 Units, subcutaneous, TID  [START ON 5/16/2024] insulin NPH and regular human, 10 Units, subcutaneous, BID AC  ipratropium-albuteroL, 3 mL, nebulization, q6h  lenalidomide, 2.5 mg, oral, Daily  [START ON 5/16/2024] levothyroxine, 25 mcg, oral, Daily before breakfast  methylPREDNISolone sodium succinate (PF), 40 mg, intravenous, q12h  [START ON 5/16/2024] pantoprazole, 40 mg, oral, Daily      Continuous medications     PRN medications  PRN medications: dextrose, dextrose, glucagon, glucagon    Review of Systems   All other systems reviewed and are negative.       Objective  Range of Vitals (last 24 hours)  Heart Rate:  [62-69]   Temp:  [36.2 °C (97.2 °F)-36.6 °C (97.9 °F)]   Resp:  [16-20]   BP: (108-144)/(47-98)   Height:  [165.1 cm (5' 5\")]   Weight:  [68 kg (149 lb 14.6 oz)-68 kg (150 lb)]   SpO2:  [93 %-98 %]   Daily Weight  05/15/24 : 68 kg (149 lb 14.6 oz)    Body mass index is 24.95 kg/m².     Physical Exam  Constitutional:       Appearance: Normal appearance.   HENT:      Head: Normocephalic and atraumatic.      Mouth/Throat:      Mouth: Mucous membranes are moist.      Pharynx: Oropharynx is clear.   Eyes:      Pupils: Pupils are equal, round, and reactive to light.   Cardiovascular:      Rate and Rhythm: Normal rate and regular rhythm.      Heart sounds: Normal heart sounds.   Pulmonary:      Effort: Pulmonary effort is normal.      Breath sounds: Normal breath sounds.   Abdominal:      General: Abdomen is flat. Bowel sounds are normal.      Palpations: " "Abdomen is soft.   Musculoskeletal:      Cervical back: Normal range of motion.   Neurological:      Mental Status: He is alert.          Relevant Results  Outside Hospital Results  reviewed  Labs  Results from last 72 hours   Lab Units 05/15/24  0823   WBC AUTO x10*3/uL 4.7   HEMOGLOBIN g/dL 11.4*   HEMATOCRIT % 33.2*   PLATELETS AUTO x10*3/uL 184   NEUTROS PCT AUTO % 49.9   LYMPHS PCT AUTO % 28.8   MONOS PCT AUTO % 14.6   EOS PCT AUTO % 5.2     Results from last 72 hours   Lab Units 05/15/24  0823   SODIUM mmol/L 135*   POTASSIUM mmol/L 3.3*   CHLORIDE mmol/L 97*   CO2 mmol/L 30   BUN mg/dL 54*   CREATININE mg/dL 2.05*   GLUCOSE mg/dL 194*   CALCIUM mg/dL 8.8   ANION GAP mmol/L 11   EGFR mL/min/1.73m*2 30*     Results from last 72 hours   Lab Units 05/15/24  0823   ALK PHOS U/L 103   BILIRUBIN TOTAL mg/dL 0.8   PROTEIN TOTAL g/dL 7.5   ALT U/L 28   AST U/L 25   ALBUMIN g/dL 3.1*     Estimated Creatinine Clearance: 20.8 mL/min (A) (by C-G formula based on SCr of 2.05 mg/dL (H)).  No results found for: \"CRP\", \"SEDRATE\"  HIV 1 and 2 Screen   Date Value Ref Range Status   05/15/2020 Non Reactive  Reference range: NONREACTIVE    Final     HIV 1/2 Antibody Confirmation   Date Value Ref Range Status   05/15/2020 Test Not Indicated  Final     Hepatitis C Ab   Date Value Ref Range Status   05/15/2020   Final    Negative  Reference range: NEGATIVE  Performed at the [facility] unless   otherwise noted.       Microbiology  Reviewed  Imaging  Reviewed       Assessment/Plan   Bronchitis / bronchiolitis / mucous plugs  Lymphoma on Revlimid    Recommendations :  Close observation off antibiotics  Incentive spirometry / chest PT  Inflammatory markers  Fungal screen  TB screen  Viral screen  Atypical pneumoniapanel    I spent minutes in the professional and overall care of this patient.      Andreea Mc MD  "

## 2024-05-16 ENCOUNTER — ANESTHESIA EVENT (OUTPATIENT)
Dept: OPERATING ROOM | Facility: HOSPITAL | Age: 89
End: 2024-05-16

## 2024-05-16 ENCOUNTER — APPOINTMENT (OUTPATIENT)
Dept: PRIMARY CARE | Facility: CLINIC | Age: 89
End: 2024-05-16
Payer: MEDICARE

## 2024-05-16 ENCOUNTER — ANESTHESIA (OUTPATIENT)
Dept: OPERATING ROOM | Facility: HOSPITAL | Age: 89
End: 2024-05-16

## 2024-05-16 ENCOUNTER — APPOINTMENT (OUTPATIENT)
Dept: RADIOLOGY | Facility: HOSPITAL | Age: 89
DRG: 202 | End: 2024-05-16
Payer: MEDICARE

## 2024-05-16 VITALS
DIASTOLIC BLOOD PRESSURE: 70 MMHG | WEIGHT: 149.91 LBS | TEMPERATURE: 97.2 F | HEIGHT: 65 IN | SYSTOLIC BLOOD PRESSURE: 129 MMHG | BODY MASS INDEX: 24.98 KG/M2 | HEART RATE: 72 BPM | OXYGEN SATURATION: 95 % | RESPIRATION RATE: 18 BRPM

## 2024-05-16 LAB
ALBUMIN SERPL BCP-MCNC: 2.8 G/DL (ref 3.4–5)
ALP SERPL-CCNC: 97 U/L (ref 33–136)
ALT SERPL W P-5'-P-CCNC: 22 U/L (ref 10–52)
ANION GAP SERPL CALC-SCNC: 11 MMOL/L (ref 10–20)
AST SERPL W P-5'-P-CCNC: 17 U/L (ref 9–39)
BASOPHILS # BLD AUTO: 0.06 X10*3/UL (ref 0–0.1)
BASOPHILS NFR BLD AUTO: 1.6 %
BILIRUB SERPL-MCNC: 0.8 MG/DL (ref 0–1.2)
BUN SERPL-MCNC: 48 MG/DL (ref 6–23)
CALCIUM SERPL-MCNC: 8.5 MG/DL (ref 8.6–10.3)
CHLORIDE SERPL-SCNC: 97 MMOL/L (ref 98–107)
CLARITY FLD: CLEAR
CO2 SERPL-SCNC: 30 MMOL/L (ref 21–32)
COLOR FLD: YELLOW
CREAT SERPL-MCNC: 1.79 MG/DL (ref 0.5–1.3)
EGFRCR SERPLBLD CKD-EPI 2021: 36 ML/MIN/1.73M*2
EOSINOPHIL # BLD AUTO: 0.28 X10*3/UL (ref 0–0.4)
EOSINOPHIL NFR BLD AUTO: 7.4 %
ERYTHROCYTE [DISTWIDTH] IN BLOOD BY AUTOMATED COUNT: 13.5 % (ref 11.5–14.5)
ERYTHROCYTE [SEDIMENTATION RATE] IN BLOOD BY WESTERGREN METHOD: 59 MM/H (ref 0–20)
GLUCOSE BLD MANUAL STRIP-MCNC: 183 MG/DL (ref 74–99)
GLUCOSE BLD MANUAL STRIP-MCNC: 338 MG/DL (ref 74–99)
GLUCOSE SERPL-MCNC: 181 MG/DL (ref 74–99)
HCT VFR BLD AUTO: 29.9 % (ref 41–52)
HGB BLD-MCNC: 10.2 G/DL (ref 13.5–17.5)
HOLD SPECIMEN: NORMAL
IMM GRANULOCYTES # BLD AUTO: 0.04 X10*3/UL (ref 0–0.5)
IMM GRANULOCYTES NFR BLD AUTO: 1.1 % (ref 0–0.9)
LYMPHOCYTES # BLD AUTO: 1.28 X10*3/UL (ref 0.8–3)
LYMPHOCYTES # BLD MANUAL: 64 %
LYMPHOCYTES NFR BLD AUTO: 34 %
MACROPHAGES NFR FLD MANUAL: 35 %
MAGNESIUM SERPL-MCNC: 1.36 MG/DL (ref 1.6–2.4)
MCH RBC QN AUTO: 31.7 PG (ref 26–34)
MCHC RBC AUTO-ENTMCNC: 34.1 G/DL (ref 32–36)
MCV RBC AUTO: 93 FL (ref 80–100)
MONOCYTES # BLD AUTO: 0.55 X10*3/UL (ref 0.05–0.8)
MONOCYTES NFR BLD AUTO: 14.6 %
NEUTROPHILS # BLD AUTO: 1.55 X10*3/UL (ref 1.6–5.5)
NEUTROPHILS NFR BLD AUTO: 41.3 %
NEUTROPHILS NFR FLD MANUAL: 1 %
NRBC BLD-RTO: 0 /100 WBCS (ref 0–0)
PLATELET # BLD AUTO: 155 X10*3/UL (ref 150–450)
POTASSIUM SERPL-SCNC: 3.6 MMOL/L (ref 3.5–5.3)
PROCALCITONIN SERPL-MCNC: 0.18 NG/ML
PROT SERPL-MCNC: 6.6 G/DL (ref 6.4–8.2)
RBC # BLD AUTO: 3.22 X10*6/UL (ref 4.5–5.9)
RBC # FLD AUTO: 1000 /UL
SODIUM SERPL-SCNC: 134 MMOL/L (ref 136–145)
TOTAL CELLS COUNTED FLD: 100
WBC # BLD AUTO: 3.8 X10*3/UL (ref 4.4–11.3)
WBC # FLD AUTO: 452 /UL

## 2024-05-16 PROCEDURE — 32555 ASPIRATE PLEURA W/ IMAGING: CPT

## 2024-05-16 PROCEDURE — 87486 CHLMYD PNEUM DNA AMP PROBE: CPT | Performed by: INTERNAL MEDICINE

## 2024-05-16 PROCEDURE — 99222 1ST HOSP IP/OBS MODERATE 55: CPT

## 2024-05-16 PROCEDURE — 80053 COMPREHEN METABOLIC PANEL: CPT | Performed by: STUDENT IN AN ORGANIZED HEALTH CARE EDUCATION/TRAINING PROGRAM

## 2024-05-16 PROCEDURE — 88112 CYTOPATH CELL ENHANCE TECH: CPT | Performed by: PATHOLOGY

## 2024-05-16 PROCEDURE — 85652 RBC SED RATE AUTOMATED: CPT | Performed by: INTERNAL MEDICINE

## 2024-05-16 PROCEDURE — 88305 TISSUE EXAM BY PATHOLOGIST: CPT | Performed by: PATHOLOGY

## 2024-05-16 PROCEDURE — 94640 AIRWAY INHALATION TREATMENT: CPT

## 2024-05-16 PROCEDURE — 83615 LACTATE (LD) (LDH) ENZYME: CPT | Mod: GEALAB | Performed by: STUDENT IN AN ORGANIZED HEALTH CARE EDUCATION/TRAINING PROGRAM

## 2024-05-16 PROCEDURE — 2500000004 HC RX 250 GENERAL PHARMACY W/ HCPCS (ALT 636 FOR OP/ED): Performed by: STUDENT IN AN ORGANIZED HEALTH CARE EDUCATION/TRAINING PROGRAM

## 2024-05-16 PROCEDURE — 84157 ASSAY OF PROTEIN OTHER: CPT | Mod: GEALAB | Performed by: STUDENT IN AN ORGANIZED HEALTH CARE EDUCATION/TRAINING PROGRAM

## 2024-05-16 PROCEDURE — 85025 COMPLETE CBC W/AUTO DIFF WBC: CPT | Performed by: STUDENT IN AN ORGANIZED HEALTH CARE EDUCATION/TRAINING PROGRAM

## 2024-05-16 PROCEDURE — 2500000002 HC RX 250 W HCPCS SELF ADMINISTERED DRUGS (ALT 637 FOR MEDICARE OP, ALT 636 FOR OP/ED): Performed by: STUDENT IN AN ORGANIZED HEALTH CARE EDUCATION/TRAINING PROGRAM

## 2024-05-16 PROCEDURE — 32555 ASPIRATE PLEURA W/ IMAGING: CPT | Performed by: RADIOLOGY

## 2024-05-16 PROCEDURE — 83735 ASSAY OF MAGNESIUM: CPT | Performed by: STUDENT IN AN ORGANIZED HEALTH CARE EDUCATION/TRAINING PROGRAM

## 2024-05-16 PROCEDURE — 94668 MNPJ CHEST WALL SBSQ: CPT

## 2024-05-16 PROCEDURE — 99239 HOSP IP/OBS DSCHRG MGMT >30: CPT | Performed by: STUDENT IN AN ORGANIZED HEALTH CARE EDUCATION/TRAINING PROGRAM

## 2024-05-16 PROCEDURE — 88305 TISSUE EXAM BY PATHOLOGIST: CPT | Mod: TC | Performed by: STUDENT IN AN ORGANIZED HEALTH CARE EDUCATION/TRAINING PROGRAM

## 2024-05-16 PROCEDURE — 2500000001 HC RX 250 WO HCPCS SELF ADMINISTERED DRUGS (ALT 637 FOR MEDICARE OP): Performed by: STUDENT IN AN ORGANIZED HEALTH CARE EDUCATION/TRAINING PROGRAM

## 2024-05-16 PROCEDURE — 82150 ASSAY OF AMYLASE: CPT | Mod: GEALAB | Performed by: STUDENT IN AN ORGANIZED HEALTH CARE EDUCATION/TRAINING PROGRAM

## 2024-05-16 PROCEDURE — 89050 BODY FLUID CELL COUNT: CPT | Performed by: STUDENT IN AN ORGANIZED HEALTH CARE EDUCATION/TRAINING PROGRAM

## 2024-05-16 PROCEDURE — 82947 ASSAY GLUCOSE BLOOD QUANT: CPT

## 2024-05-16 PROCEDURE — 84478 ASSAY OF TRIGLYCERIDES: CPT | Mod: GEALAB | Performed by: STUDENT IN AN ORGANIZED HEALTH CARE EDUCATION/TRAINING PROGRAM

## 2024-05-16 PROCEDURE — 83986 ASSAY PH BODY FLUID NOS: CPT | Mod: GEALAB | Performed by: STUDENT IN AN ORGANIZED HEALTH CARE EDUCATION/TRAINING PROGRAM

## 2024-05-16 PROCEDURE — 82945 GLUCOSE OTHER FLUID: CPT | Mod: GEALAB | Performed by: STUDENT IN AN ORGANIZED HEALTH CARE EDUCATION/TRAINING PROGRAM

## 2024-05-16 PROCEDURE — 0W9B3ZZ DRAINAGE OF LEFT PLEURAL CAVITY, PERCUTANEOUS APPROACH: ICD-10-PCS | Performed by: RADIOLOGY

## 2024-05-16 PROCEDURE — 87205 SMEAR GRAM STAIN: CPT | Mod: 91,GEALAB | Performed by: STUDENT IN AN ORGANIZED HEALTH CARE EDUCATION/TRAINING PROGRAM

## 2024-05-16 PROCEDURE — 2500000004 HC RX 250 GENERAL PHARMACY W/ HCPCS (ALT 636 FOR OP/ED)

## 2024-05-16 RX ORDER — MAGNESIUM SULFATE HEPTAHYDRATE 40 MG/ML
2 INJECTION, SOLUTION INTRAVENOUS ONCE
Status: COMPLETED | OUTPATIENT
Start: 2024-05-16 | End: 2024-05-16

## 2024-05-16 RX ORDER — HEPARIN SODIUM,PORCINE/PF 10 UNIT/ML
10 SYRINGE (ML) INTRAVENOUS ONCE
Status: COMPLETED | OUTPATIENT
Start: 2024-05-16 | End: 2024-05-16

## 2024-05-16 RX ORDER — ACYCLOVIR 200 MG/1
400 CAPSULE ORAL DAILY
Status: DISCONTINUED | OUTPATIENT
Start: 2024-05-16 | End: 2024-05-16 | Stop reason: HOSPADM

## 2024-05-16 RX ADMIN — HEPARIN, PORCINE (PF) 10 UNIT/ML INTRAVENOUS SYRINGE 10 UNITS: at 14:00

## 2024-05-16 RX ADMIN — CARVEDILOL 25 MG: 25 TABLET, FILM COATED ORAL at 09:11

## 2024-05-16 RX ADMIN — INSULIN LISPRO 3 UNITS: 100 INJECTION, SOLUTION INTRAVENOUS; SUBCUTANEOUS at 09:15

## 2024-05-16 RX ADMIN — INSULIN HUMAN 10 UNITS: 100 INJECTION, SUSPENSION SUBCUTANEOUS at 11:58

## 2024-05-16 RX ADMIN — ACYCLOVIR 400 MG: 200 CAPSULE ORAL at 09:11

## 2024-05-16 RX ADMIN — INSULIN LISPRO 12 UNITS: 100 INJECTION, SOLUTION INTRAVENOUS; SUBCUTANEOUS at 12:01

## 2024-05-16 RX ADMIN — IPRATROPIUM BROMIDE AND ALBUTEROL SULFATE 3 ML: 2.5; .5 SOLUTION RESPIRATORY (INHALATION) at 08:16

## 2024-05-16 RX ADMIN — FERROUS SULFATE TAB 325 MG (65 MG ELEMENTAL FE) 1 TABLET: 325 (65 FE) TAB at 09:11

## 2024-05-16 RX ADMIN — LENALIDOMIDE 2.5 MG: 2.5 CAPSULE ORAL at 09:15

## 2024-05-16 RX ADMIN — FAMOTIDINE 20 MG: 20 TABLET, FILM COATED ORAL at 09:12

## 2024-05-16 RX ADMIN — MAGNESIUM SULFATE HEPTAHYDRATE 2 G: 2 INJECTION, SOLUTION INTRAVENOUS at 10:06

## 2024-05-16 RX ADMIN — METHYLPREDNISOLONE SODIUM SUCCINATE 40 MG: 40 INJECTION, POWDER, FOR SOLUTION INTRAMUSCULAR; INTRAVENOUS at 06:57

## 2024-05-16 ASSESSMENT — ENCOUNTER SYMPTOMS
SHORTNESS OF BREATH: 1
ABDOMINAL PAIN: 0
WEAKNESS: 0
WHEEZING: 1
DIZZINESS: 0
PALPITATIONS: 0
CHILLS: 0
COUGH: 1
FEVER: 0

## 2024-05-16 ASSESSMENT — PAIN SCALES - GENERAL: PAINLEVEL_OUTOF10: 0 - NO PAIN

## 2024-05-16 NOTE — CONSULTS
Inpatient consult to Cardiology  Consult performed by: Tiffany Harry, DOROTA-CNP  Consult ordered by: Amber Medley MD  Reason for consult: AF            History Of Present Illness  Shady Becerril is a 89 y.o. male presenting with shortness of breath. He was just discharged from [facility] where he was seen for similar complaints. He was discharged with the intent to have an outpt thoracentesis. His shortness of breath got worse so he came to the ER.     Lab work in the ER showed, glucose 194, sodium 135, potassium 3.3, BUN/Cr 54/2.05, magnesium 1.38, , troponin 24, 25, WBC 4.7, H&H 11.4/33.2, CTA of the chest negative for PE, diffuse central bronchial wall thickening, concerning for bronchitis with mucous plugging.     EKG showed atrial fibrillation, rate controlled.     He is now s/p thoracentesis with 400mL removed. He is currently on room air.       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    Stenosis of both lacrimal ducts    Acquired stenosis of left nasolacrimal duct 06/01/2021    Stenosis of left lacrimal duct    Body mass index (BMI) 31.0-31.9, adult 03/02/2020    Body mass index (BMI) of 31.0 to 31.9 in adult    Disorder of the skin and subcutaneous tissue, unspecified     Skin lesion of face    Hepatomegaly, not elsewhere classified 04/01/2020    Liver mass    Low grade B-cell lymphoma (Multi)     Multiple myeloma (Multi)     Noninfective gastroenteritis and colitis, unspecified 08/31/2020    Enteritis    Ocular laceration without prolapse or loss of intraocular tissue, unspecified eye, initial encounter 02/15/2021    Eye laceration    Other abnormal findings in specimens from other organs, systems and tissues 05/18/2020    Abnormal bone  marrow examination    Other conditions influencing health status 05/18/2020    Transition of care    Personal history of other diseases of the circulatory system     History of hypotension    Personal history of other diseases of the circulatory system 05/05/2020    History of orthostatic hypotension    Personal history of other diseases of the musculoskeletal system and connective tissue 03/10/2020    History of back pain    Personal history of other infectious and parasitic diseases 10/19/2020    History of candidiasis of mouth    Personal history of other specified conditions 10/20/2020    History of weight loss    Shortness of breath 03/19/2020    SOB (shortness of breath) on exertion    Unsteadiness on feet 05/06/2020    General unsteadiness on examination    Unsteadiness on feet 05/06/2020    Unsteadiness       Surgical History  No past surgical history on file.     Social History  He reports that he quit smoking about 44 years ago. His smoking use included pipe. He has never used smokeless tobacco. He reports that he does not currently use alcohol. He reports that he does not use drugs.    Family History  No family history on file.     Allergies  Patient has no known allergies.    Review of Systems  Review of Systems   Constitutional:  Negative for chills and fever.   Respiratory:  Positive for cough, shortness of breath and wheezing.    Cardiovascular:  Negative for chest pain, palpitations and leg swelling.   Gastrointestinal:  Negative for abdominal pain.   Neurological:  Negative for dizziness and weakness.   All other systems reviewed and are negative.         Physical Exam  Constitutional: Well developed, awake/alert/oriented x3, no distress, alert and cooperative  Eyes: PERRL, EOMI, clear sclera  ENMT: mucous membranes moist, no apparent injury, no lesions seen  Head/Neck: Neck supple, no apparent injury, thyroid without mass or tenderness, No JVD, trachea midline, no bruits  Respiratory/Thorax: Patent  "airways, coarse wheezes throughout with good chest expansion, thorax symmetric  Cardiovascular: irregular rhythm, no murmurs, 2+ equal pulses of the extremities, normal S 1and S 2  Gastrointestinal: Nondistended, soft, non-tender, no rebound tenderness or guarding, no masses palpable, no organomegaly, +BS, no bruits  Musculoskeletal: ROM intact, no joint swelling, normal strength  Extremities: normal extremities, no cyanosis edema, contusions or wounds, no clubbing  Neurological: alert and oriented x3, intact senses, motor, response and reflexes, normal strength  Lymphatic: No significant lymphadenopathy  Psychological: Appropriate mood and behavior  Skin: Warm and dry, no lesions, no rashes       Last Recorded Vitals  Blood pressure 129/70, pulse 72, temperature 36.2 °C (97.2 °F), temperature source Temporal, resp. rate 18, height 1.651 m (5' 5\"), weight 68 kg (149 lb 14.6 oz), SpO2 95%.    Relevant Results    Scheduled medications  acyclovir, 400 mg, oral, Daily  amLODIPine, 5 mg, oral, Daily  aspirin, 81 mg, oral, Daily  atorvastatin, 20 mg, oral, Nightly  carvedilol, 25 mg, oral, BID  famotidine, 20 mg, oral, BID  ferrous sulfate (325 mg ferrous sulfate), 1 tablet, oral, Daily with breakfast  insulin lispro, 0-15 Units, subcutaneous, TID  insulin NPH and regular human, 10 Units, subcutaneous, BID AC  ipratropium-albuteroL, 3 mL, nebulization, q6h while awake  lenalidomide, 2.5 mg, oral, Daily  levothyroxine, 25 mcg, oral, Daily before breakfast  methylPREDNISolone sodium succinate (PF), 40 mg, intravenous, q12h  pantoprazole, 40 mg, oral, Daily      Continuous medications     PRN medications  PRN medications: dextrose, dextrose, glucagon, glucagon, ipratropium-albuteroL    Results for orders placed or performed during the hospital encounter of 05/15/24 (from the past 24 hour(s))   POCT GLUCOSE   Result Value Ref Range    POCT Glucose 347 (H) 74 - 99 mg/dL   POCT GLUCOSE   Result Value Ref Range    POCT Glucose " 338 (H) 74 - 99 mg/dL   Procalcitonin   Result Value Ref Range    Procalcitonin 0.18 (H) <=0.07 ng/mL   Comprehensive Metabolic Panel   Result Value Ref Range    Glucose 181 (H) 74 - 99 mg/dL    Sodium 134 (L) 136 - 145 mmol/L    Potassium 3.6 3.5 - 5.3 mmol/L    Chloride 97 (L) 98 - 107 mmol/L    Bicarbonate 30 21 - 32 mmol/L    Anion Gap 11 10 - 20 mmol/L    Urea Nitrogen 48 (H) 6 - 23 mg/dL    Creatinine 1.79 (H) 0.50 - 1.30 mg/dL    eGFR 36 (L) >60 mL/min/1.73m*2    Calcium 8.5 (L) 8.6 - 10.3 mg/dL    Albumin 2.8 (L) 3.4 - 5.0 g/dL    Alkaline Phosphatase 97 33 - 136 U/L    Total Protein 6.6 6.4 - 8.2 g/dL    AST 17 9 - 39 U/L    Bilirubin, Total 0.8 0.0 - 1.2 mg/dL    ALT 22 10 - 52 U/L   CBC and Auto Differential   Result Value Ref Range    WBC 3.8 (L) 4.4 - 11.3 x10*3/uL    nRBC 0.0 0.0 - 0.0 /100 WBCs    RBC 3.22 (L) 4.50 - 5.90 x10*6/uL    Hemoglobin 10.2 (L) 13.5 - 17.5 g/dL    Hematocrit 29.9 (L) 41.0 - 52.0 %    MCV 93 80 - 100 fL    MCH 31.7 26.0 - 34.0 pg    MCHC 34.1 32.0 - 36.0 g/dL    RDW 13.5 11.5 - 14.5 %    Platelets 155 150 - 450 x10*3/uL    Neutrophils % 41.3 40.0 - 80.0 %    Immature Granulocytes %, Automated 1.1 (H) 0.0 - 0.9 %    Lymphocytes % 34.0 13.0 - 44.0 %    Monocytes % 14.6 2.0 - 10.0 %    Eosinophils % 7.4 0.0 - 6.0 %    Basophils % 1.6 0.0 - 2.0 %    Neutrophils Absolute 1.55 (L) 1.60 - 5.50 x10*3/uL    Immature Granulocytes Absolute, Automated 0.04 0.00 - 0.50 x10*3/uL    Lymphocytes Absolute 1.28 0.80 - 3.00 x10*3/uL    Monocytes Absolute 0.55 0.05 - 0.80 x10*3/uL    Eosinophils Absolute 0.28 0.00 - 0.40 x10*3/uL    Basophils Absolute 0.06 0.00 - 0.10 x10*3/uL   Magnesium   Result Value Ref Range    Magnesium 1.36 (L) 1.60 - 2.40 mg/dL   Sedimentation Rate   Result Value Ref Range    Sedimentation Rate 59 (H) 0 - 20 mm/h   POCT GLUCOSE   Result Value Ref Range    POCT Glucose 183 (H) 74 - 99 mg/dL   POCT GLUCOSE   Result Value Ref Range    POCT Glucose 338 (H) 74 - 99 mg/dL        US thoracentesis   Final Result   Uneventful  left-sided thoracentesis, as detailed above   .        Signed by: Vanita Shepherd 5/16/2024 12:22 PM   Dictation workstation:   [id number]          Transthoracic Echo (TTE) Complete    Result Date: 5/7/2024   [facility], [address] [place] [address]              Tel [phone] and Fax [phone] TRANSTHORACIC ECHOCARDIOGRAM REPORT  Patient Name:      [facility]           Reading Physician:    [id number] Derik Haq MD Study Date:        5/7/2024             Ordering Provider:    [id number] PARKER GOMEZ MRN/PID:           [id number]             Fellow: Accession#:        [id number]         Nurse:                Denisse Kahn RN Date of Birth/Age: 2/20/1934 / 89 years Sonographer:          Heavenly Ramos                                                               RDCS, RDMS Gender:            M                    Additional Staff: Height:            167.64 cm            Admit Date: Weight:            69.85 kg             Admission Status:     Inpatient -                                                               Routine BSA / BMI:         1.79 m2 / 24.86      Encounter#:           [id number]                    kg/m2                                         Department Location:  [facility] Blood Pressure: 136 /58 mmHg Study Type:    TRANSTHORACIC ECHO (TTE) COMPLETE Diagnosis/ICD: Acute on chronic diastolic (congestive) heart failure                (CHF)-I50.33 Indication:    Congestive Heart Failure CPT Code:      Echo Complete w Full Doppler-[id number] Patient History: Diabetes:          Yes Pertinent History: A-Fib, HTN and Dyslipidemia. hx watchman device, hx CABG,                    hypothyroid, SOB, pleural effusion. Study Detail: The following  Echo studies were performed: 2D, M-Mode, Doppler and               color flow. Technically challenging study due to body habitus.               Definity used as a contrast agent for endocardial border               definition. Total contrast used for this procedure was 1.5 mL via               IV push. The patient was awake.  PHYSICIAN INTERPRETATION: Left Ventricle: The left ventricular systolic function is hyperdynamic, with an estimated ejection fraction of 65-70%. There are no regional wall motion abnormalities. The left ventricular cavity size is normal. There is moderate concentric left ventricular hypertrophy. Spectral Doppler shows a pseudonormal pattern of left ventricular diastolic filling. Left Atrium: The left atrium is mildly dilated. Right Ventricle: The right ventricle is mildly enlarged. There is mildly reduced right ventricular systolic function. Right Atrium: The right atrium is mildly dilated. Aortic Valve: The aortic valve is trileaflet. There is moderate aortic valve cusp calcification. There is evidence of moderate aortic valve stenosis. There is no evidence of aortic valve regurgitation. The peak instantaneous gradient of the aortic valve is 31.1 mmHg. The mean gradient of the aortic valve is 18.0 mmHg. Mitral Valve: The mitral valve is normal in structure. There is mild to moderate mitral annular calcification. There is moderate mitral valve regurgitation. Tricuspid Valve: The tricuspid valve is structurally normal. There is mild tricuspid regurgitation. Pulmonic Valve: The pulmonic valve is structurally normal. There is no indication of pulmonic valve regurgitation. Pericardium: There is a trivial pericardial effusion. Pleural: There is a large left pleural effusion. Aorta: The aortic root is normal. Pulmonary Artery: The tricuspid regurgitant velocity is 4.14 m/s, and with an estimated right atrial pressure of 3 mmHg, the estimated pulmonary artery pressure is moderate to severely elevated  with the RVSP at 71.6 mmHg. Pulmonary Veins: There is blunted systolic flow in the pulmonary veins. Systemic Veins: The inferior vena cava appears to be of normal size. There is IVC inspiratory collapse greater than 50%.  CONCLUSIONS:  1. Left ventricular systolic function is hyperdynamic with a 65-70% estimated ejection fraction.  2. Spectral Doppler shows a pseudonormal pattern of left ventricular diastolic filling.  3. There is moderate concentric left ventricular hypertrophy.  4. There is mildly reduced right ventricular systolic function.  5. There is a large left pleural effusion.  6. Moderate mitral valve regurgitation.  7. Moderate aortic valve stenosis.  8. There is moderate aortic valve cusp calcification.  9. Moderate to severely elevated pulmonary artery pressure. 10. Estimated CVP is normal. QUANTITATIVE DATA SUMMARY: 2D MEASUREMENTS:                           Normal Ranges: Ao Root d:     2.60 cm    (2.0-3.7cm) LAs:           4.00 cm    (2.7-4.0cm) IVSd:          1.46 cm    (0.6-1.1cm) LVPWd:         1.51 cm    (0.6-1.1cm) LVIDd:         3.30 cm    (3.9-5.9cm) LVIDs:         2.61 cm LV Mass Index: 124.9 g/m2 LV % FS        21.1 % LA VOLUME:                               Normal Ranges: LA Vol A4C:        57.4 ml    (22+/-6mL/m2) LA Vol A2C:        53.3 ml LA Vol BP:         55.8 ml LA Vol Index A4C:  32.1ml/m2 LA Vol Index A2C:  29.8 ml/m2 LA Vol Index BP:   31.2 ml/m2 LA Area A4C:       20.3 cm2 LA Area A2C:       19.4 cm2 LA Major Axis A4C: 6.1 cm LA Major Axis A2C: 6.0 cm LA Volume Index:   30.0 ml/m2 RA VOLUME BY A/L METHOD:                               Normal Ranges: RA Vol A4C:        38.5 ml    (8.3-19.5ml) RA Vol Index A4C:  21.5 ml/m2 RA Area A4C:       16.2 cm2 RA Major Axis A4C: 5.8 cm M-MODE MEASUREMENTS:                  Normal Ranges: Ao Root: 2.60 cm (2.0-3.7cm) LAs:     3.90 cm (2.7-4.0cm) AORTA MEASUREMENTS:                    Normal Ranges: Asc Ao, d: 2.70 cm (2.1-3.4cm) LV SYSTOLIC  FUNCTION BY 2D PLANIMETRY (MOD):                     Normal Ranges: EF-A4C View: 65.0 % (>=55%) EF-A2C View: 64.9 % EF-Biplane:  66.1 % LV DIASTOLIC FUNCTION:                             Normal Ranges: MV Peak E:      1.50 m/s    (0.7-1.2 m/s) MV Peak A:      0.56 m/s    (0.42-0.7 m/s) E/A Ratio:      2.69        (1.0-2.2) MV e'           0.06 m/s    (>8.0) MV lateral e'   0.08 m/s MV medial e'    0.04 m/s MV A Dur:       116.00 msec E/e' Ratio:     24.96       (<8.0) PulmV Sys Silvestre:  34.80 cm/s PulmV Estes Silvestre: 98.10 cm/s PulmV S/D Silvestre:  0.40 MITRAL VALVE:                 Normal Ranges: MV DT: 236 msec (150-240msec) MITRAL INSUFFICIENCY:                           Normal Ranges: PISA Radius:  0.5 cm MR VTI:       145.00 cm MR Vmax:      483.00 cm/s MR Alias Silvestre: 54.1 cm/s MR Volume:    25.51 ml MR Flow Rt:   84.98 ml/s MR EROA:      0.18 cm2 AORTIC VALVE:                                    Normal Ranges: AoV Vmax:                2.79 m/s  (<=1.7m/s) AoV Peak P.1 mmHg (<20mmHg) AoV Mean P.0 mmHg (1.7-11.5mmHg) LVOT Max Silvestre:            1.39 m/s  (<=1.1m/s) AoV VTI:                 57.00 cm  (18-25cm) LVOT VTI:                30.10 cm LVOT Diameter:           1.80 cm   (1.8-2.4cm) AoV Area, VTI:           1.34 cm2  (2.5-5.5cm2) AoV Area,Vmax:           1.27 cm2  (2.5-4.5cm2) AoV Dimensionless Index: 0.53  RIGHT VENTRICLE: RV Basal 3.50 cm RV Mid   2.90 cm RV Major 6.9 cm TAPSE:   12.4 mm RV s'    0.08 m/s TRICUSPID VALVE/RVSP:                             Normal Ranges: Peak TR Velocity: 4.14 m/s Est. RA Pressure: 3 mmHg RV Syst Pressure: 71.6 mmHg (< 30mmHg) IVC Diam:         1.50 cm PULMONIC VALVE:                       Normal Ranges: PV Max Silvestre: 2.0 m/s   (0.6-0.9m/s) PV Max PG:  15.4 mmHg Pulmonary Veins: PulmV Estes Silvestre: 98.10 cm/s PulmV S/D Silvestre:  0.40 PulmV Sys Silvestre:  34.80 cm/s  [id number] Derik Haq MD Electronically signed on 2024 at 7:32:50 PM  ** Final **          Assessment/Plan     Pleural effusion  Permanent atrial fibrillation  Chronic diastolic CHF  Bronchitis, recent pneumonia  Hypertension  Hyperlipidemia    Patient has been admitted to the hospital and monitored on telemetry.     Re admitted for ongoing shortness of breath. Now s/p thoracentesis with 400mL removed. Now on room air, denies shortness of breath. Follow up for thoracentesis results.      Poor candidate for anticoagulants due to previous history of major gastrointestinal bleeding.     He will continue on rate control of atrial fibrillation with beta-blockers, continue aspirin for cardioprotection and stroke prevention, atorvastatin for hyperlipidemia amlodipine for hypertension.       He has chronic kidney disease stage III and has renal function will be watched closely avoid nephrotoxic medications.       He has chronic anemia and will continue on supplemental iron, close monitoring of hemoglobin, hematocrit.       Continue Revlimid or schedule for his lymphoma.       Continue levothyroxine for hypothyroidism, insulin for diabetes with close monitoring of blood sugars.     Echocardiogram as above shows vigorous LV systolic function with an ejection fraction of 70% moderate concentric left ventricular hypertrophy mildly reduced right ventricular function moderate aortic valve stenosis moderate mitral regurgitation normal estimated central venous pressure but moderately to severely elevated pulmonary artery systolic pressure.      Tiffany Harry, APRN-CNP

## 2024-05-16 NOTE — PROGRESS NOTES
05/16/24 1038   Discharge Planning   Living Arrangements Spouse/significant other   Support Systems Spouse/significant other;Family members   Assistance Needed patient is alert and oriented x3, independent in ADL's, no AD, no DME, drives   Type of Residence Private residence   Number of Stairs to Enter Residence 4   Number of Stairs Within Residence 14   Do you have animals or pets at home? No   Who is requesting discharge planning? Provider   Home or Post Acute Services None   Patient expects to be discharged to: Home patient denies need for HC   Does the patient need discharge transport arranged? No

## 2024-05-16 NOTE — CARE PLAN
The patient's goals for the shift include  comfort, rest    The clinical goals for the shift include monitor vitals, rest

## 2024-05-16 NOTE — PROGRESS NOTES
"Shady Becerril is a 89 y.o. male on day 1 of admission presenting with SOB (shortness of breath).    Subjective   Pt says his SOB has improved.        Objective     Physical Exam  Vitals and nursing note reviewed.   Constitutional:       General: He is not in acute distress.     Appearance: Normal appearance. He is not ill-appearing or toxic-appearing.   HENT:      Head: Normocephalic and atraumatic.      Nose: Nose normal.      Mouth/Throat:      Mouth: Mucous membranes are moist.   Eyes:      Extraocular Movements: Extraocular movements intact.      Conjunctiva/sclera: Conjunctivae normal.      Pupils: Pupils are equal, round, and reactive to light.   Cardiovascular:      Rate and Rhythm: Normal rate and regular rhythm.      Heart sounds: No murmur heard.     No gallop.   Pulmonary:      Effort: No respiratory distress.      Breath sounds: Wheezing and rhonchi present. No rales.      Comments: + conversational dyspnea with frequent non productive coughs  Abdominal:      General: Abdomen is flat. Bowel sounds are normal. There is no distension.      Palpations: Abdomen is soft. There is no mass.      Tenderness: There is no abdominal tenderness.   Musculoskeletal:         General: No swelling or tenderness. Normal range of motion.      Cervical back: Normal range of motion and neck supple.   Skin:     General: Skin is warm and dry.   Neurological:      General: No focal deficit present.      Mental Status: He is alert and oriented to person, place, and time. Mental status is at baseline.   Psychiatric:         Mood and Affect: Mood normal.         Behavior: Behavior normal.         Thought Content: Thought content normal.         Judgment: Judgment normal.     Last Recorded Vitals:  /58   Pulse 71   Temp 36.2 °C (97.2 °F) (Temporal)   Resp 18   Ht 1.651 m (5' 5\")   Wt 68 kg (149 lb 14.6 oz)   SpO2 94%   BMI 24.95 kg/m²      Scheduled medications:  acyclovir, 400 mg, oral, Daily  amLODIPine, 5 " mg, oral, Daily  aspirin, 81 mg, oral, Daily  atorvastatin, 20 mg, oral, Nightly  carvedilol, 25 mg, oral, BID  famotidine, 20 mg, oral, BID  ferrous sulfate (325 mg ferrous sulfate), 1 tablet, oral, Daily with breakfast  insulin lispro, 0-15 Units, subcutaneous, TID  insulin NPH and regular human, 10 Units, subcutaneous, BID AC  ipratropium-albuteroL, 3 mL, nebulization, q6h while awake  lenalidomide, 2.5 mg, oral, Daily  levothyroxine, 25 mcg, oral, Daily before breakfast  magnesium sulfate, 2 g, intravenous, Once  methylPREDNISolone sodium succinate (PF), 40 mg, intravenous, q12h  pantoprazole, 40 mg, oral, Daily      Continuous medications:     PRN medications:  PRN medications: dextrose, dextrose, glucagon, glucagon, ipratropium-albuteroL     Relevant Results:  Results for orders placed or performed during the hospital encounter of 05/15/24 (from the past 24 hour(s))   POCT GLUCOSE   Result Value Ref Range    POCT Glucose 347 (H) 74 - 99 mg/dL   POCT GLUCOSE   Result Value Ref Range    POCT Glucose 338 (H) 74 - 99 mg/dL   Procalcitonin   Result Value Ref Range    Procalcitonin 0.18 (H) <=0.07 ng/mL   Comprehensive Metabolic Panel   Result Value Ref Range    Glucose 181 (H) 74 - 99 mg/dL    Sodium 134 (L) 136 - 145 mmol/L    Potassium 3.6 3.5 - 5.3 mmol/L    Chloride 97 (L) 98 - 107 mmol/L    Bicarbonate 30 21 - 32 mmol/L    Anion Gap 11 10 - 20 mmol/L    Urea Nitrogen 48 (H) 6 - 23 mg/dL    Creatinine 1.79 (H) 0.50 - 1.30 mg/dL    eGFR 36 (L) >60 mL/min/1.73m*2    Calcium 8.5 (L) 8.6 - 10.3 mg/dL    Albumin 2.8 (L) 3.4 - 5.0 g/dL    Alkaline Phosphatase 97 33 - 136 U/L    Total Protein 6.6 6.4 - 8.2 g/dL    AST 17 9 - 39 U/L    Bilirubin, Total 0.8 0.0 - 1.2 mg/dL    ALT 22 10 - 52 U/L   CBC and Auto Differential   Result Value Ref Range    WBC 3.8 (L) 4.4 - 11.3 x10*3/uL    nRBC 0.0 0.0 - 0.0 /100 WBCs    RBC 3.22 (L) 4.50 - 5.90 x10*6/uL    Hemoglobin 10.2 (L) 13.5 - 17.5 g/dL    Hematocrit 29.9 (L) 41.0 -  52.0 %    MCV 93 80 - 100 fL    MCH 31.7 26.0 - 34.0 pg    MCHC 34.1 32.0 - 36.0 g/dL    RDW 13.5 11.5 - 14.5 %    Platelets 155 150 - 450 x10*3/uL    Neutrophils % 41.3 40.0 - 80.0 %    Immature Granulocytes %, Automated 1.1 (H) 0.0 - 0.9 %    Lymphocytes % 34.0 13.0 - 44.0 %    Monocytes % 14.6 2.0 - 10.0 %    Eosinophils % 7.4 0.0 - 6.0 %    Basophils % 1.6 0.0 - 2.0 %    Neutrophils Absolute 1.55 (L) 1.60 - 5.50 x10*3/uL    Immature Granulocytes Absolute, Automated 0.04 0.00 - 0.50 x10*3/uL    Lymphocytes Absolute 1.28 0.80 - 3.00 x10*3/uL    Monocytes Absolute 0.55 0.05 - 0.80 x10*3/uL    Eosinophils Absolute 0.28 0.00 - 0.40 x10*3/uL    Basophils Absolute 0.06 0.00 - 0.10 x10*3/uL   Magnesium   Result Value Ref Range    Magnesium 1.36 (L) 1.60 - 2.40 mg/dL   Sedimentation Rate   Result Value Ref Range    Sedimentation Rate 59 (H) 0 - 20 mm/h   POCT GLUCOSE   Result Value Ref Range    POCT Glucose 183 (H) 74 - 99 mg/dL       ECG 12 lead    Result Date: 5/15/2024  Atrial fibrillation with a competing junctional pacemaker Low voltage QRS Nonspecific T wave abnormality Abnormal ECG When compared with ECG of 15-MAY-2024 07:51, (unconfirmed) No significant change was found    ECG 12 lead    Result Date: 5/15/2024  Atrial fibrillation Low voltage QRS Nonspecific ST and T wave abnormality Abnormal ECG When compared with ECG of 12-MAY-2024 10:30, (unconfirmed) Minimal criteria for Anteroseptal infarct are no longer Present Nonspecific T wave abnormality, worse in Anterior leads    CT angio chest for pulmonary embolism    Result Date: 5/15/2024  STUDY: CT Angiogram of the Chest; 05/15/2024 10:22 AM INDICATION: Shortness of breath, hypoxia. COMPARISON: XR chest 05/12/2024, NM lung perfusion 10/31/2023. ACCESSION NUMBER(S): [id number] ORDERING CLINICIAN: ALDEN CASTORENA TECHNIQUE:  CTA of the chest was performed with intravenous contrast. Images are reviewed and processed at a workstation according to the CT angiogram  protocol with 3-D and/or MIP post processing imaging generated.  Omnipaque 350 61 mL was administered intravenously. Automated mA/kV exposure control was utilized and patient examination was performed in strict accordance with principles of ALARA. FINDINGS: Pulmonary arteries are adequately opacified without acute or chronic filling defects.  The thoracic aorta is normal in course and caliber without dissection or aneurysm. The heart is normal in size without pericardial effusion.  Severe coronary artery calcifications.  Thoracic lymph nodes are not enlarged.  Right-sided [id number]. Small left pleural effusion.  Diffuse central bronchial wall thickening, worst in the lower lobes with some associated mucous plugging. Biapical pleural-parenchymal scarring mild scattered bilateral areas of bronchiolitis.  Scattered calcified granulomata. Gallbladder is present without gallstones.  Bilateral renal cysts measuring up to 3.4 cm on the right. There are no acute fractures.  No suspicious bony lesions.    1. No pulmonary embolism. 2. Diffuse central bronchial wall thickening, worst in the lower lobes with some associated mucous plugging. Findings suggestive of bronchitis. 3. Mild scattered bilateral areas of bronchiolitis. 4. Small left pleural effusion. 5. Severe coronary artery calcifications.  Signed by Ivan Awad MD           Assessment/Plan   Principal Problem:    SOB (shortness of breath)    SOB 2/2 bronchitis, left pleural effusion  Recently treated with abx for pneumonia  Still at risk for atypical infections given immunocompromised state  - pulm consult  - duoneb  - IV steroids  - ID following  - hold abx  - Thoracentesis per IR     CHARLENE/CKD  Cr improving  - monitor     HypoK/HypoMg  - replaced     Elevated trop 2/2 demand ischemia  - cardio consulted  - already on statin     Normocytic anemia  - monitor     DM  - NPH  - ISS     HTN  - norvasc  - coreg     CAD sp CABG  - statin  - ASA  - coreg      Hypothyroid  - Synthroid     Low B cell  - revlimid  - acyclovir for ppx     GERD  - protonix     Diet: diabetic  DVT ppx: SCD  Code status: DNR CCA/DNI, confirmed with pt  Dispo: monitor clinically          Amber Medley MD  Hospitalist

## 2024-05-16 NOTE — PROGRESS NOTES
"Shady Becerril is a 89 y.o. male on day 1 of admission presenting with SOB (shortness of breath).    Subjective   Interval History: no fever, intermittent dry cough        Review of Systems    Objective   Range of Vitals (last 24 hours)  Heart Rate:  [62-75]   Temp:  [36.2 °C (97.2 °F)-36.6 °C (97.9 °F)]   Resp:  [17-20]   BP: (108-140)/(47-98)   Height:  [165.1 cm (5' 5\")]   Weight:  [68 kg (149 lb 14.6 oz)]   SpO2:  [92 %-98 %]   Daily Weight  05/15/24 : 68 kg (149 lb 14.6 oz)    Body mass index is 24.95 kg/m².    Physical Exam  Constitutional:       Appearance: Normal appearance.   HENT:      Head: Normocephalic and atraumatic.      Mouth/Throat:      Mouth: Mucous membranes are moist.      Pharynx: Oropharynx is clear.   Eyes:      Pupils: Pupils are equal, round, and reactive to light.   Cardiovascular:      Rate and Rhythm: Normal rate and regular rhythm.      Heart sounds: Normal heart sounds.   Pulmonary:      Effort: Pulmonary effort is normal.      Breath sounds: Wheezing and rales present.   Abdominal:      General: Abdomen is flat. Bowel sounds are normal.      Palpations: Abdomen is soft.   Musculoskeletal:      Cervical back: Normal range of motion.   Neurological:      Mental Status: He is alert.         Antibiotics  aspirin EC tablet 81 mg  atorvastatin (Lipitor) tablet 20 mg  amLODIPine (Norvasc) tablet 5 mg  acyclovir (Zovirax) tablet 400 mg  carvedilol (Coreg) tablet 25 mg  famotidine (Pepcid) tablet 20 mg  ferrous sulfate (325 mg ferrous sulfate) tablet 1 tablet  insulin NPH and regular human (HumuLIN 70-30, NovoLIN 70-30) 100 unit/mL (70-30) injection 10 Units  lenalidomide (Revlimid) capsule 2.5 mg  levothyroxine (Synthroid, Levoxyl) tablet 25 mcg  pantoprazole (ProtoNix) EC tablet 40 mg  glucagon (Glucagen) injection 1 mg  dextrose 50 % injection 25 g  glucagon (Glucagen) injection 1 mg  dextrose 50 % injection 12.5 g  insulin lispro (HumaLOG) injection 0-15 " Units  ipratropium-albuteroL (Duo-Neb) 0.5-2.5 mg/3 mL nebulizer solution 3 mL  methylPREDNISolone sod succinate (SOLU-Medrol) 40 mg/mL injection 40 mg  ipratropium-albuteroL (Duo-Neb) 0.5-2.5 mg/3 mL nebulizer solution 3 mL  ipratropium-albuteroL (Duo-Neb) 0.5-2.5 mg/3 mL nebulizer solution 3 mL  acyclovir (Zovirax) capsule 400 mg  magnesium sulfate IV 2 g      Relevant Results  Labs  Results from last 72 hours   Lab Units 05/16/24  0438 05/15/24  0823   WBC AUTO x10*3/uL 3.8* 4.7   HEMOGLOBIN g/dL 10.2* 11.4*   HEMATOCRIT % 29.9* 33.2*   PLATELETS AUTO x10*3/uL 155 184   NEUTROS PCT AUTO % 41.3 49.9   LYMPHS PCT AUTO % 34.0 28.8   MONOS PCT AUTO % 14.6 14.6   EOS PCT AUTO % 7.4 5.2     Results from last 72 hours   Lab Units 05/16/24  0438 05/15/24  0823   SODIUM mmol/L 134* 135*   POTASSIUM mmol/L 3.6 3.3*   CHLORIDE mmol/L 97* 97*   CO2 mmol/L 30 30   BUN mg/dL 48* 54*   CREATININE mg/dL 1.79* 2.05*   GLUCOSE mg/dL 181* 194*   CALCIUM mg/dL 8.5* 8.8   ANION GAP mmol/L 11 11   EGFR mL/min/1.73m*2 36* 30*     Results from last 72 hours   Lab Units 05/16/24  0438 05/15/24  0823   ALK PHOS U/L 97 103   BILIRUBIN TOTAL mg/dL 0.8 0.8   PROTEIN TOTAL g/dL 6.6 7.5   ALT U/L 22 28   AST U/L 17 25   ALBUMIN g/dL 2.8* 3.1*     Estimated Creatinine Clearance: 23.9 mL/min (A) (by C-G formula based on SCr of 1.79 mg/dL (H)).  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      Andreea Mc MD

## 2024-05-16 NOTE — CONSULTS
[facility]  Consult Service: [facility] PULMONOLOGY  Admit Date/RoomTime: 5/15/2024 / 105/105-A   Admitting Dx: SOB (shortness of breath) [R06.02]      Inpatient consult to Pulmonology  Consult performed by: Haleigh Johnson MD  Consult ordered by: Amber Medley MD  Reason for consult: sob         Consult Reason: sob     Chief Complaint   Patient: Shady Becerril is a 89 y.o. male  who presented to the hospital for No chief complaint on file.    HPI   HPI: Shady Becerril is a 89 y.o. 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  Patient previously presented on 05/07/2024 to [facility]  ED for acute hypoxic respiratory failure.  He was managed for pneumonia and CHF exacerbation.  Patient also had an echo done on 05/07/2024 by Dr. More and he was found to have large left pleural effusion.  There was plan for thoracentesis.  [facility] IR was contacted on 05/09/2024 to schedule US guided thoracentesis.  Patient was subsequently discharged on 05/10/2024 with the plan for outpatient Thora on cefuroxime.  Since then, patient returned to [facility] ED twice on 05/12/2024 and then again on 05/15/2024.  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    Stenosis of both  lacrimal ducts    Acquired stenosis of left nasolacrimal duct 06/01/2021    Stenosis of left lacrimal duct    Body mass index (BMI) 31.0-31.9, adult 03/02/2020    Body mass index (BMI) of 31.0 to 31.9 in adult    Disorder of the skin and subcutaneous tissue, unspecified     Skin lesion of face    Hepatomegaly, not elsewhere classified 04/01/2020    Liver mass    Low grade B-cell lymphoma (Multi)     Multiple myeloma (Multi)     Noninfective gastroenteritis and colitis, unspecified 08/31/2020    Enteritis    Ocular laceration without prolapse or loss of intraocular tissue, unspecified eye, initial encounter 02/15/2021    Eye laceration    Other abnormal findings in specimens from other organs, systems and tissues 05/18/2020    Abnormal bone marrow examination    Other conditions influencing health status 05/18/2020    Transition of care    Personal history of other diseases of the circulatory system     History of hypotension    Personal history of other diseases of the circulatory system 05/05/2020    History of orthostatic hypotension    Personal history of other diseases of the musculoskeletal system and connective tissue 03/10/2020    History of back pain    Personal history of other infectious and parasitic diseases 10/19/2020    History of candidiasis of mouth    Personal history of other specified conditions 10/20/2020    History of weight loss    Shortness of breath 03/19/2020    SOB (shortness of breath) on exertion    Unsteadiness on feet 05/06/2020    General unsteadiness on examination    Unsteadiness on feet 05/06/2020    Unsteadiness        Surgical History   No past surgical history on file.    Family History   No family history on file.    Social History     Social History     Socioeconomic History    Marital status:      Spouse name: Not on file    Number of children: Not on file    Years of education: Not on file    Highest education level: Not on file   Occupational History    Not on file    Tobacco Use    Smoking status: Former     Types: Pipe     Quit date: 1980     Years since quittin.4    Smokeless tobacco: Never   Vaping Use    Vaping status: Never Used   Substance and Sexual Activity    Alcohol use: Not Currently    Drug use: Never    Sexual activity: Defer   Other Topics Concern    Not on file   Social History Narrative    Not on file     Social Determinants of Health     Financial Resource Strain: Low Risk  (5/15/2024)    Overall Financial Resource Strain (CARDIA)     Difficulty of Paying Living Expenses: Not hard at all   Food Insecurity: No Food Insecurity (4/15/2020)    Received from [facility]    Hunger Vital Sign     Worried About Running Out of Food in the Last Year: Never true     Ran Out of Food in the Last Year: Never true   Transportation Needs: No Transportation Needs (5/15/2024)    PRAPARE - Transportation     Lack of Transportation (Medical): No     Lack of Transportation (Non-Medical): No   Physical Activity: Not on file   Stress: Not on file   Social Connections: Not on file   Intimate Partner Violence: Not on file   Housing Stability: Low Risk  (5/15/2024)    Housing Stability Vital Sign     Unable to Pay for Housing in the Last Year: No     Number of Places Lived in the Last Year: 1     Unstable Housing in the Last Year: No       Tobacco Use: Medium Risk (5/15/2024)    Patient History     Smoking Tobacco Use: Former     Smokeless Tobacco Use: Never     Passive Exposure: Not on file        Social History     Substance and Sexual Activity   Alcohol Use Not Currently        Allergies   No Known Allergies       Meds    Scheduled medications  acyclovir, 400 mg, oral, Daily  amLODIPine, 5 mg, oral, Daily  aspirin, 81 mg, oral, Daily  atorvastatin, 20 mg, oral, Nightly  carvedilol, 25 mg, oral, BID  famotidine, 20 mg, oral, BID  ferrous sulfate (325 mg ferrous sulfate), 1 tablet, oral, Daily with breakfast  insulin lispro, 0-15 Units, subcutaneous, TID  insulin NPH and  "regular human, 10 Units, subcutaneous, BID AC  ipratropium-albuteroL, 3 mL, nebulization, q6h while awake  lenalidomide, 2.5 mg, oral, Daily  levothyroxine, 25 mcg, oral, Daily before breakfast  methylPREDNISolone sodium succinate (PF), 40 mg, intravenous, q12h  pantoprazole, 40 mg, oral, Daily      Continuous medications     PRN medications  PRN medications: dextrose, dextrose, glucagon, glucagon, ipratropium-albuteroL     Objective    Physical Exam  HENT:      Head: Normocephalic and atraumatic.   Cardiovascular:      Heart sounds: No murmur heard.     No friction rub.   Pulmonary:      Effort: No respiratory distress.      Breath sounds: No stridor.   Abdominal:      General: There is no distension.      Palpations: There is no mass.   Neurological:      Mental Status: He is alert and oriented to person, place, and time.          Visit Vitals  /70   Pulse 72   Temp 36.2 °C (97.2 °F) (Temporal)   Resp 18   Ht 1.651 m (5' 5\")   Wt 68 kg (149 lb 14.6 oz)   SpO2 95%   BMI 24.95 kg/m²   Smoking Status Former   BSA 1.77 m²          Intake/Output Summary (Last 24 hours) at 5/16/2024 1447  Last data filed at 5/16/2024 1330  Gross per 24 hour   Intake 50 ml   Output --   Net 50 ml       Labs:   Results from last 72 hours   Lab Units 05/16/24  0438 05/15/24  0823   SODIUM mmol/L 134* 135*   POTASSIUM mmol/L 3.6 3.3*   CHLORIDE mmol/L 97* 97*   CO2 mmol/L 30 30   BUN mg/dL 48* 54*   CREATININE mg/dL 1.79* 2.05*   GLUCOSE mg/dL 181* 194*   CALCIUM mg/dL 8.5* 8.8   ANION GAP mmol/L 11 11   EGFR mL/min/1.73m*2 36* 30*      Results from last 72 hours   Lab Units 05/16/24  0438 05/15/24  0823   WBC AUTO x10*3/uL 3.8* 4.7   HEMOGLOBIN g/dL 10.2* 11.4*   HEMATOCRIT % 29.9* 33.2*   PLATELETS AUTO x10*3/uL 155 184   NEUTROS PCT AUTO % 41.3 49.9   LYMPHS PCT AUTO % 34.0 28.8   MONOS PCT AUTO % 14.6 14.6   EOS PCT AUTO % 7.4 5.2      Lab Results   Component Value Date    CALCIUM 8.5 (L) 05/16/2024    PHOS 3.4 09/09/2020      Lab " Results   Component Value Date    CRP 1.18 (H) 05/15/2024     Lab Results   Component Value Date    BLOODCULT Loaded on Instrument - Culture in progress 05/15/2024    BLOODCULT Loaded on Instrument - Culture in progress 05/15/2024     Images    US thoracentesis  Result Date: 5/16/2024  Uneventful  left-sided thoracentesis, as detailed above .   Signed by: Vanita Shepherd 5/16/2024 12:22 PM Dictation workstation:   [id number]    ECG 12 lead  Result Date: 5/15/2024  Atrial fibrillation with a competing junctional pacemaker Low voltage QRS Nonspecific T wave abnormality Abnormal ECG When compared with ECG of 15-MAY-2024 07:51, (unconfirmed) No significant change was found    ECG 12 lead    Result Date: 5/15/2024  Atrial fibrillation Low voltage QRS Nonspecific ST and T wave abnormality Abnormal ECG When compared with ECG of 12-MAY-2024 10:30, (unconfirmed) Minimal criteria for Anteroseptal infarct are no longer Present Nonspecific T wave abnormality, worse in Anterior leads    CT angio chest for pulmonary embolism  Result Date: 5/15/2024  1. No pulmonary embolism. 2. Diffuse central bronchial wall thickening, worst in the lower lobes with some associated mucous plugging. Findings suggestive of bronchitis. 3. Mild scattered bilateral areas of bronchiolitis. 4. Small left pleural effusion. 5. Severe coronary artery calcifications.  Signed by Ivan Awad MD    ECG 12 lead  Result Date: 5/13/2024  Atrial fibrillation with slow ventricular response Low voltage QRS Cannot rule out Anteroseptal infarct (cited on or before 07-MAY-2024) Abnormal ECG When compared with ECG of 07-MAY-2024 08:23, Vent. rate has decreased BY  26 BPM Questionable change in initial forces of Anterior leads T wave inversion no longer evident in Anterior leads    XR chest 1 view  Result Date: 5/12/2024  1.Moderate left pleural effusion with associated nonspecific consolidation in the left base, unchanged. The lungs are otherwise clear. 2.Vascular  access port over the right side of the chest. Signed by Raciel Neff MD    ECG 12 lead  Result Date: 5/10/2024  Atrial fibrillation Low voltage QRS Septal infarct (cited on or before 07-MAY-2024) ST & T wave abnormality, consider anterior ischemia Abnormal ECG When compared with ECG of 07-MAY-2024 04:59, (unconfirmed) No significant change was found See ED provider note for full interpretation and clinical correlation Confirmed by Milvia Calero ([id number]) on 5/10/2024 1:28:07 PM    ECG 12 lead  Result Date: 5/10/2024  Atrial fibrillation Low voltage QRS Cannot rule out Anteroseptal infarct , age undetermined Abnormal ECG When compared with ECG of 06-SEP-2020 01:34, Atrial fibrillation has replaced Sinus rhythm Minimal criteria for Anteroseptal infarct are now Present Nonspecific T wave abnormality now evident in Inferior leads Nonspecific T wave abnormality now evident in Anterior leads See ED provider note for full interpretation and clinical correlation Confirmed by Milvia Calero ([id number]) on 5/10/2024 1:22:37 PM    XR chest 2 views  Result Date: 5/9/2024  1.  Moderate left pleural effusion, unchanged.       MACRO: None   Signed by: Johnie Merrill 5/9/2024 3:49 PM Dictation workstation:   [id number]    XR chest 1 view  Result Date: 5/8/2024  1.  Moderate left pleural effusion with only minimal layering.       MACRO: None   Signed by: Johnie Merrill 5/8/2024 4:46 PM Dictation workstation:   [id number]    Transthoracic Echo (TTE) Complete  Result Date: 5/7/2024  CONCLUSIONS:  1. Left ventricular systolic function is hyperdynamic with a 65-70% estimated ejection fraction.  2. Spectral Doppler shows a pseudonormal pattern of left ventricular diastolic filling.  3. There is moderate concentric left ventricular hypertrophy.  4. There is mildly reduced right ventricular systolic function.  5. There is a large left pleural effusion.  6. Moderate mitral valve regurgitation.  7. Moderate aortic valve  stenosis.  8. There is moderate aortic valve cusp calcification.  9. Moderate to severely elevated pulmonary artery pressure. 10. Estimated CVP is normal.    XR chest 1 view  Result Date: 5/7/2024  Findings of mild ongoing CHF with tiny right pleural effusion, and small to moderate-sized left pleural effusion with associated left basilar atelectasis.   Previous CABG.   Stable right-sided central venous MediPort.   MACRO: None   Signed by: Ken Watts 5/7/2024 8:46 AM Dictation workstation:   [id number]    CT chest wo IV contrast  Result Date: 5/7/2024  1. Moderate size left pleural effusion with left lower lobe and lingular consolidation/atelectasis. Consider pneumonia. 2. Stable findings of prior granulomatous disease. \ small right pleural effusion. 3. Left atrial enlargement. 4. Indeterminate focal region of fat stranding along the left lateral aspect of the pectoralis major muscle measuring 2.3 x 1.5 cm on axial image 125/314.   this could reflect focal infectious/inflammatory process, atypical region of gynecomastia, or other soft tissue mass. Please correlate with physical exam.     Signed by: Tank Iverson 5/7/2024 6:09 AM Dictation workstation:   [id number]       Assessment and Plan    Shady Becerril is a 89 y.o. male admitted on 5/15/2024 as a direct transfer from [facility] ED for acute hypoxic respiratory failure.  Imaging done showed Diffuse central bronchial wall thickening, worst in the lower lobes   with some associated mucous plugging, Small left pleural effusion.  Pulmonology was consulted for shortness of breath    # Acute hypoxic respiratory failure (resolved)  - Patient was seen and examined at bedside status post US guided thoracentesis.  Saturating appropriately on room air  - Around 400 cc of serosanguinous colored fluid was removed.  Status post patient stated that his breathing has significantly improved. Labs sent  - Labs with leukocytosis 3.8, CRP 1.18, Trope by 24 -->  25, lactate 0.8.  - Imaging was reviewed  - Given that patient's symptoms improved after fluid removal and he was saturating appropriately on room air, do not recommend any additional interventions  - Patient is clear from pulm standpoint for discharge.    Patient staffed with attending Dr. Johnson. Thank you for the interesting consult. Pulmonology will sign off. Please contact via Epic chat/Haiku with any questions/concerns.     Braydon Moore MD  Internal Medicine, PGY- 1  05/16/24 at 2:47 PM     Disclaimer: Documentation completed with the information available at the time of input. The times in the chart may not be reflective of actual patient care times, interventions, or procedures. Documentation occurs after the physical care of the patient.

## 2024-05-17 ENCOUNTER — TELEPHONE (OUTPATIENT)
Dept: PRIMARY CARE | Facility: CLINIC | Age: 89
End: 2024-05-17
Payer: MEDICARE

## 2024-05-17 LAB
AMYLASE FLD-CCNC: 12 U/L
ASPERGILLUS GALACTOMANNAN EIA,SERUM: 0.04
CMV IGM SERPL-ACNC: <8 AU/ML
FUNGITELL BETA-D GLUCAN,SERUM: <31 PG/ML
GLUCOSE FLD-MCNC: 243 MG/DL
LDH FLD L TO P-CCNC: 99 U/L
PH FLD: 6.99 [PH]
PROT FLD-MCNC: 3.1 G/DL
TRIGL FLD-MCNC: 13 MG/DL

## 2024-05-17 NOTE — DOCUMENTATION CLARIFICATION NOTE
"    PATIENT:               ANISA HURTADO  ACCT #:                  [id number]  MRN:                       [id number]  :                       1934  ADMIT DATE:       5/15/2024 3:46 PM  DISCH DATE:        2024 2:04 PM  RESPONDING PROVIDER #:        [id number]          PROVIDER RESPONSE TEXT:    Acute Respiratory Failure ruled out after further workup    CDI QUERY TEXT:    Clarification    Instruction:    Based on your assessment of the patient and the clinical information, please provide the requested documentation by clicking on the appropriate radio button and enter any additional information if prompted.    Question: Please clarify diagnosis of respiratory failure as    When answering this query, please exercise your independent professional judgment. The fact that a question is being asked, does not imply that any particular answer is desired or expected.    The patient's clinical indicators include:  Clinical Information:   89 y.o. year old male with PMH HFpEF, CAD sp CABG, A fib, HTN, low grade B-cell Non-Hodgkin Lymphoma on Revlimid 2.5 mg daily 21 days on and 7 days off presented to OSH for worsening SOB. He said he was just diagnosed with pneumonia 1 week PTA and had finished course of abx.    Documented Diagnosis: Acute respiratory failure with hypoxia    Clinical Indicators and Documentation:  -Vital Signs: RR: 16/20 (5/15-)  -VBG results: pO2-45, pSO2: 65  -Physical Exam Findings:  -Breath sounds: Wheezing and rhonchi present. No rales. (per H&P)  -Distress: + conversational dyspnea with frequent non productive coughs (per H&P)  -Cyanosis: none  -Oxygen Therapy: none  -Pulmonary Consult: \"Acute hypoxic respiratory failure (resolved), post US thoracentesis, saturating appropriately on room air, pt stated breathing has significantly improved.\"    H&P noted \"SOB 2/2 bronchitis, left pleural effusion, recently treated with abx for pneumonia; still at risk for atypical infections given " "immunocompromised state.\"    Discharge summary noted \"pt not in acute distress, pulmonary assessment-no respiratory distress, wheezing & rhonchi present.\"    Treatment: Thoracentesis-400ml removed, Prednisone 40mg IV q12h, Incentive spirometry    Risk Factors: DM, former smoker, lymphoma, recent PNA-treated w/ antibiotics  Options provided:  -- Acute Respiratory Failure ruled out after further workup  -- Acute Respiratory Failure as evidenced by, Please specify additional information below  -- Other - I will add my own diagnosis  -- Refer to Clinical Documentation Reviewer    Query created by: Cori Frost on 5/17/2024 9:19 AM      Electronically signed by:  RAUL BLACKBURN MD 5/17/2024 9:24 AM          "

## 2024-05-17 NOTE — TELEPHONE ENCOUNTER
Transition of Care    Inpatient facility: [facility]  Discharge diagnosis: SOB   Discharged to: Home  Discharge date: 05/16/2024  Initial Call date: 05/17/2024  Spoke with patient/caregiver: Cinda joy                                                                     Do you need assistance  visits prior to your PCP visit: No  Home health care ordered: No  Have you been contacted by home care and have a start of care date: No  Are you taking medications as prescribed at discharge: Yes    Referral to [facility] Pharmacist: No  Patient advised to bring all medications to PCP follow-up appointment.  Patient advised to follow discharge instructions until provider follow-up.  TCM visit date: [id number]  TCM provider visit with: Dr. Juliette Zazueta    TCM visit must be scheduled to occur within 14 days of discharge (included DC date).  When possible TCM visit should be scheduled within 7 days.: scheduled within appropriate time

## 2024-05-19 LAB
BACTERIA BLD CULT: NORMAL
BACTERIA BLD CULT: NORMAL

## 2024-05-20 LAB
BACTERIA FLD CULT: NORMAL
GRAM STN SPEC: NORMAL
GRAM STN SPEC: NORMAL

## 2024-05-20 NOTE — SIGNIFICANT EVENT
Follow Up Phone Call    Outgoing phone call    Spoke to: Shady Becerril Relationship:self   Phone number: [phone]      Outcome: I left a message on answering machine   No chief complaint on file.         Diagnosis:Not applicable

## 2024-05-21 ENCOUNTER — OFFICE VISIT (OUTPATIENT)
Dept: PRIMARY CARE | Facility: CLINIC | Age: 89
End: 2024-05-21
Payer: MEDICARE

## 2024-05-21 VITALS
BODY MASS INDEX: 22.43 KG/M2 | OXYGEN SATURATION: 96 % | SYSTOLIC BLOOD PRESSURE: 118 MMHG | DIASTOLIC BLOOD PRESSURE: 62 MMHG | TEMPERATURE: 97.3 F | HEART RATE: 58 BPM | WEIGHT: 134.8 LBS

## 2024-05-21 DIAGNOSIS — I50.9 CONGESTIVE HEART FAILURE, UNSPECIFIED HF CHRONICITY, UNSPECIFIED HEART FAILURE TYPE (MULTI): ICD-10-CM

## 2024-05-21 DIAGNOSIS — J45.50 SEVERE PERSISTENT ASTHMATIC BRONCHITIS WITHOUT COMPLICATION (MULTI): Primary | ICD-10-CM

## 2024-05-21 DIAGNOSIS — C90.01 MULTIPLE MYELOMA IN REMISSION (MULTI): ICD-10-CM

## 2024-05-21 DIAGNOSIS — J42 CHRONIC BRONCHITIS, UNSPECIFIED CHRONIC BRONCHITIS TYPE (MULTI): ICD-10-CM

## 2024-05-21 DIAGNOSIS — E78.00 HYPERCHOLESTEREMIA: ICD-10-CM

## 2024-05-21 DIAGNOSIS — I48.0 PAF (PAROXYSMAL ATRIAL FIBRILLATION) (MULTI): ICD-10-CM

## 2024-05-21 DIAGNOSIS — J90 PLEURAL EFFUSION: ICD-10-CM

## 2024-05-21 DIAGNOSIS — C83.00 LYMPHOPLASMACYTIC LYMPHOMA (MULTI): ICD-10-CM

## 2024-05-21 LAB
LABORATORY COMMENT REPORT: NORMAL
LABORATORY COMMENT REPORT: NORMAL
PATH REPORT.FINAL DX SPEC: NORMAL
PATH REPORT.GROSS SPEC: NORMAL
PATH REPORT.RELEVANT HX SPEC: NORMAL
PATH REPORT.TOTAL CANCER: NORMAL

## 2024-05-21 PROCEDURE — 1111F DSCHRG MED/CURRENT MED MERGE: CPT | Performed by: INTERNAL MEDICINE

## 2024-05-21 PROCEDURE — 1160F RVW MEDS BY RX/DR IN RCRD: CPT | Performed by: INTERNAL MEDICINE

## 2024-05-21 PROCEDURE — 3074F SYST BP LT 130 MM HG: CPT | Performed by: INTERNAL MEDICINE

## 2024-05-21 PROCEDURE — 1157F ADVNC CARE PLAN IN RCRD: CPT | Performed by: INTERNAL MEDICINE

## 2024-05-21 PROCEDURE — 3078F DIAST BP <80 MM HG: CPT | Performed by: INTERNAL MEDICINE

## 2024-05-21 PROCEDURE — 1036F TOBACCO NON-USER: CPT | Performed by: INTERNAL MEDICINE

## 2024-05-21 PROCEDURE — 99496 TRANSJ CARE MGMT HIGH F2F 7D: CPT | Performed by: INTERNAL MEDICINE

## 2024-05-21 PROCEDURE — 1159F MED LIST DOCD IN RCRD: CPT | Performed by: INTERNAL MEDICINE

## 2024-05-21 RX ORDER — ALBUTEROL SULFATE 0.83 MG/ML
2.5 SOLUTION RESPIRATORY (INHALATION) EVERY 6 HOURS PRN
Qty: 75 ML | Refills: 1 | Status: CANCELLED | OUTPATIENT
Start: 2024-05-21

## 2024-05-21 RX ORDER — DOXYCYCLINE 100 MG/1
100 CAPSULE ORAL 2 TIMES DAILY
Qty: 20 CAPSULE | Refills: 0 | Status: SHIPPED | OUTPATIENT
Start: 2024-05-21 | End: 2024-05-31

## 2024-05-21 RX ORDER — IPRATROPIUM BROMIDE AND ALBUTEROL SULFATE 2.5; .5 MG/3ML; MG/3ML
3 SOLUTION RESPIRATORY (INHALATION)
Qty: 360 ML | Refills: 11 | Status: SHIPPED | OUTPATIENT
Start: 2024-05-21 | End: 2025-05-21

## 2024-05-21 RX ORDER — PREDNISONE 20 MG/1
20 TABLET ORAL DAILY
Qty: 7 TABLET | Refills: 0 | Status: SHIPPED | OUTPATIENT
Start: 2024-05-21 | End: 2024-05-28

## 2024-05-21 ASSESSMENT — ENCOUNTER SYMPTOMS
FEVER: 0
DIARRHEA: 0
SORE THROAT: 0
COUGH: 1
BLOOD IN STOOL: 0
HEADACHES: 0
FATIGUE: 0
SINUS PAIN: 0
DIZZINESS: 0
ARTHRALGIAS: 0
ABDOMINAL PAIN: 0
DIFFICULTY URINATING: 0
WHEEZING: 1
UNEXPECTED WEIGHT CHANGE: 0
PALPITATIONS: 0
BRUISES/BLEEDS EASILY: 0

## 2024-05-21 NOTE — PROGRESS NOTES
Subjective   Patient ID: Shady Becerril is a 89 y.o. male who presents for Hospital Follow-up (TCM, hospital follow up for SOB, discharged on 5/16/24, cough continues purulent production but mostly non productive).    Transition of care  Patient admission history and physical, hospital course, medications, verified and reviewed  Patient contacted after discharge, medications verified comes today for follow-up    Inpatient facility: [facility]  Discharge diagnosis: SOB          Discharged to: Home  Discharge date: 05/16/2024  Initial Call date: 05/17/2024  Spoke with patient/caregiver: Cinda joy                                                                      Do you need assistance  visits prior to your PCP visit: No  Home health care ordered: No  Have you been contacted by home care and have a start of care date: No  Are you taking medications as prescribed at discharge: Yes     Referral to [facility] Pharmacist: No  Patient advised to bring all medications to PCP follow-up appointment.  Patient advised to follow discharge instructions until provider follow-up.  TCM visit date: [id number]  TCM provider visit with: Dr. Juliette Zazueta     TCM visit must be scheduled to occur within 14 days of discharge (included DC date).  When possible TCM visit should be scheduled within 7 days.: scheduled within appropriate time  -Patient recently admitted for left lower lobe pneumonia treated with antibiotics improved patient found to have right pleural effusion which noted to be chronic for few years patient discharged home to be followed up as an outpatient and discuss pleurocentesis  After discharge home patient returned back to the emergency room for shortness of breath breathing difficulty  Transferred to [facility] where he was seen by pulmonary had chest tube removal of 400 cc transudate no signs of effusion or infection  Patient feeling much better except for persistent cough and  wheezing  -Status post pneumonia healed finished antibiotics  -Persistent moderate asthmatic bronchitis not resolving switch patient to DuoNeb treatments may use Mucinex over-the-counter  Restart patient on doxycycline twice a day for 10 days  Starting prednisone 20 mg daily for 7 days  - Uncontrolled diabetes patient counseled about increased insulin sliding scale before each meal continue with current insulin continue monitoring closely as needed  -CHF compensated continue with Demadex  -Coronary artery disease compensated  - Hypoxemia compensated  -Lymphoplasmacytic lymphoma follow-up oncology continue with regular mild  - Coronary artery disease history of bypass surgery compensated  - Hypercalcemia controlled  - Chronic renal insufficiency stable  Reevaluate patient in 3 months           Review of Systems   Constitutional:  Negative for fatigue, fever and unexpected weight change.   HENT:  Negative for congestion, ear discharge, ear pain, mouth sores, sinus pain and sore throat.    Eyes:  Negative for visual disturbance.   Respiratory:  Positive for cough and wheezing.    Cardiovascular:  Negative for chest pain, palpitations and leg swelling.   Gastrointestinal:  Negative for abdominal pain, blood in stool and diarrhea.   Genitourinary:  Negative for difficulty urinating.   Musculoskeletal:  Negative for arthralgias.   Skin:  Negative for rash.   Neurological:  Negative for dizziness and headaches.   Hematological:  Does not bruise/bleed easily.   Psychiatric/Behavioral:  Negative for behavioral problems.    All other systems reviewed and are negative.      Objective   Lab Results   Component Value Date    HGBA1C 5.4 05/08/2024      /62   Pulse 58   Temp 36.3 °C (97.3 °F)   Wt 61.1 kg (134 lb 12.8 oz)   SpO2 96%   BMI 22.43 kg/m²   Lab Results   Component Value Date    WBC 3.8 (L) 05/16/2024    HGB 10.2 (L) 05/16/2024    HCT 29.9 (L) 05/16/2024     05/16/2024    CHOL 62 11/21/2023    TRIG 54  11/21/2023    HDL 31.1 11/21/2023    ALT 22 05/16/2024    AST 17 05/16/2024     (L) 05/16/2024    K 3.6 05/16/2024    CL 97 (L) 05/16/2024    CREATININE 1.79 (H) 05/16/2024    BUN 48 (H) 05/16/2024    CO2 30 05/16/2024    TSH 3.15 09/13/2021    INR 1.3 (H) 05/12/2024    HGBA1C 5.4 05/08/2024     par   Physical Exam  Vitals and nursing note reviewed.   Constitutional:       Appearance: Normal appearance.   HENT:      Head: Normocephalic.      Nose: Nose normal.   Eyes:      Conjunctiva/sclera: Conjunctivae normal.      Pupils: Pupils are equal, round, and reactive to light.   Cardiovascular:      Rate and Rhythm: Regular rhythm.   Pulmonary:      Effort: Pulmonary effort is normal.      Breath sounds: Wheezing present.   Abdominal:      General: Abdomen is flat.      Palpations: Abdomen is soft.   Musculoskeletal:      Cervical back: Neck supple.   Skin:     General: Skin is warm.   Neurological:      General: No focal deficit present.      Mental Status: He is oriented to person, place, and time.   Psychiatric:         Mood and Affect: Mood normal.         Assessment/Plan   Shady was seen today for hospital follow-up.  Diagnoses and all orders for this visit:  Severe persistent asthmatic bronchitis without complication (Multi) (Primary)  -     predniSONE (Deltasone) 20 mg tablet; Take 1 tablet (20 mg) by mouth once daily for 7 days.  -     ipratropium-albuteroL (Duo-Neb) 0.5-2.5 mg/3 mL nebulizer solution; Take 3 mL by nebulization 4 times a day.  -     doxycycline (Vibramycin) 100 mg capsule; Take 1 capsule (100 mg) by mouth 2 times a day for 10 days. Take with at least 8 ounces (large glass) of water, do not lie down for 30 minutes after  Chronic bronchitis, unspecified chronic bronchitis type (Multi)  Multiple myeloma in remission (Multi)  PAF (paroxysmal atrial fibrillation) (Multi)  Pleural effusion  Lymphoplasmacytic lymphoma (Multi)  Hypercholesteremia  Congestive heart failure, unspecified HF  chronicity, unspecified heart failure type (Multi)  Other orders  -     Follow Up In Primary Care - Established; Future   Transition of care  Patient admission history and physical, hospital course, medications, verified and reviewed  Patient contacted after discharge, medications verified comes today for follow-up    Inpatient facility: [facility]  Discharge diagnosis: SOB          Discharged to: Home  Discharge date: 05/16/2024  Initial Call date: 05/17/2024  Spoke with patient/caregiver: rufino, Cinda                                                                      Do you need assistance  visits prior to your PCP visit: No  Home health care ordered: No  Have you been contacted by home care and have a start of care date: No  Are you taking medications as prescribed at discharge: Yes     Referral to [facility] Pharmacist: No  Patient advised to bring all medications to PCP follow-up appointment.  Patient advised to follow discharge instructions until provider follow-up.  TCM visit date: [id number]  TCM provider visit with: Dr. Juliette Zazueta     TCM visit must be scheduled to occur within 14 days of discharge (included DC date).  When possible TCM visit should be scheduled within 7 days.: scheduled within appropriate time  -Patient recently admitted for left lower lobe pneumonia treated with antibiotics improved patient found to have right pleural effusion which noted to be chronic for few years patient discharged home to be followed up as an outpatient and discuss pleurocentesis  After discharge home patient returned back to the emergency room for shortness of breath breathing difficulty  Transferred to [facility] where he was seen by pulmonary had chest tube removal of 400 cc transudate no signs of effusion or infection  Patient feeling much better except for persistent cough and wheezing  -Status post pneumonia healed finished antibiotics  -Persistent moderate asthmatic bronchitis not  resolving switch patient to DuoNeb treatments may use Mucinex over-the-counter  Restart patient on doxycycline twice a day for 10 days  Starting prednisone 20 mg daily for 7 days  - Uncontrolled diabetes patient counseled about increased insulin sliding scale before each meal continue with current insulin continue monitoring closely as needed  -CHF compensated continue with Demadex  -Coronary artery disease compensated  - Hypoxemia compensated  -Lymphoplasmacytic lymphoma follow-up oncology continue with regular mild  - Coronary artery disease history of bypass surgery compensated  - Hypercalcemia controlled  - Chronic renal insufficiency stable  Reevaluate patient in 3 months

## 2024-05-22 LAB
ATRIAL RATE: 234 BPM
ATRIAL RATE: 241 BPM
ATRIAL RATE: 52 BPM
Q ONSET: 220 MS
Q ONSET: 222 MS
Q ONSET: 224 MS
QRS COUNT: 11 BEATS
QRS COUNT: 11 BEATS
QRS COUNT: 9 BEATS
QRS DURATION: 86 MS
QRS DURATION: 90 MS
QRS DURATION: 98 MS
QT INTERVAL: 436 MS
QT INTERVAL: 444 MS
QT INTERVAL: 460 MS
QTC CALCULATION(BAZETT): 419 MS
QTC CALCULATION(BAZETT): 449 MS
QTC CALCULATION(BAZETT): 472 MS
QTC FREDERICIA: 433 MS
QTC FREDERICIA: 445 MS
QTC FREDERICIA: 463 MS
R AXIS: -2 DEGREES
R AXIS: 10 DEGREES
R AXIS: 23 DEGREES
T AXIS: 208 DEGREES
T AXIS: 232 DEGREES
T AXIS: 72 DEGREES
T OFFSET: 440 MS
T OFFSET: 442 MS
T OFFSET: 454 MS
VENTRICULAR RATE: 50 BPM
VENTRICULAR RATE: 64 BPM
VENTRICULAR RATE: 68 BPM

## 2024-05-23 DIAGNOSIS — J42 CHRONIC BRONCHITIS, UNSPECIFIED CHRONIC BRONCHITIS TYPE (MULTI): ICD-10-CM

## 2024-05-23 RX ORDER — ALBUTEROL SULFATE 90 UG/1
2 AEROSOL, METERED RESPIRATORY (INHALATION) EVERY 6 HOURS PRN
COMMUNITY
End: 2024-05-23 | Stop reason: ALTCHOICE

## 2024-05-23 RX ORDER — ALBUTEROL SULFATE 90 UG/1
2 AEROSOL, METERED RESPIRATORY (INHALATION) EVERY 6 HOURS PRN
Qty: 18 G | Refills: 1 | Status: SHIPPED | OUTPATIENT
Start: 2024-05-23

## 2024-05-27 DIAGNOSIS — I50.9 CONGESTIVE HEART FAILURE, UNSPECIFIED HF CHRONICITY, UNSPECIFIED HEART FAILURE TYPE (MULTI): Primary | ICD-10-CM

## 2024-05-27 DIAGNOSIS — E11.9 DIABETES MELLITUS TYPE 2 WITHOUT RETINOPATHY (MULTI): ICD-10-CM

## 2024-05-29 ENCOUNTER — HOSPITAL ENCOUNTER (OUTPATIENT)
Dept: RADIOLOGY | Facility: HOSPITAL | Age: 89
Discharge: HOME | End: 2024-05-29
Payer: MEDICARE

## 2024-05-29 VITALS
SYSTOLIC BLOOD PRESSURE: 100 MMHG | RESPIRATION RATE: 17 BRPM | OXYGEN SATURATION: 95 % | DIASTOLIC BLOOD PRESSURE: 66 MMHG | HEART RATE: 51 BPM

## 2024-05-29 DIAGNOSIS — J90 PLEURAL EFFUSION ON LEFT: ICD-10-CM

## 2024-05-29 PROCEDURE — 32555 ASPIRATE PLEURA W/ IMAGING: CPT

## 2024-05-29 PROCEDURE — 32555 ASPIRATE PLEURA W/ IMAGING: CPT | Performed by: RADIOLOGY

## 2024-05-29 NOTE — DISCHARGE INSTRUCTIONS
Okay to use ice as needed for pain  Okay to take tylenol as needed for pain  Notify MD with any s/s of infection or active bleeding  Okay to remove band-aid tomorrow

## 2024-06-10 ENCOUNTER — TELEMEDICINE (OUTPATIENT)
Dept: PHARMACY | Facility: HOSPITAL | Age: 89
End: 2024-06-10
Payer: MEDICARE

## 2024-06-10 DIAGNOSIS — E11.9 DIABETES MELLITUS TYPE 2 WITHOUT RETINOPATHY (MULTI): ICD-10-CM

## 2024-06-10 DIAGNOSIS — I50.9 CONGESTIVE HEART FAILURE, UNSPECIFIED HF CHRONICITY, UNSPECIFIED HEART FAILURE TYPE (MULTI): ICD-10-CM

## 2024-06-10 NOTE — PROGRESS NOTES
Pharmacy Post-Discharge Visit  Shady Becerril is a 89 y.o. male who was referred to the Clinical Pharmacy Team to complete a post-discharge medication optimization and monitoring visit.  The patient was referred for their Congestive Heart Failure and Diabetes.    Recent Hospitalization  Admission Date: 5/15/24  Discharge Date: 5/16/24  Discharge Diagnosis: SOB    Referring Provider: Dr. Zazueta    Preferred Pharmacy  [facility] [address] [place] [id number]  Phone: [phone] Fax: [phone]    No Known Allergies    Notable Medication changes following discharge:  Start:   N/A  Change:   N/A  Hold:  N/A  Stop:   Cefuroxime 500 mg daily    CHF ASSESSMENT- Patient preferred not to complete, see below    DIABETES ASSESSMENT- Patient preferred not to complete, see below      Laboratory Results  Lab Results   Component Value Date    BILITOT 0.8 05/16/2024    CALCIUM 8.5 (L) 05/16/2024    CO2 30 05/16/2024    CL 97 (L) 05/16/2024    CREATININE 1.79 (H) 05/16/2024    GLUCOSE 181 (H) 05/16/2024    ALKPHOS 97 05/16/2024    K 3.6 05/16/2024    PROT 6.6 05/16/2024     (L) 05/16/2024    AST 17 05/16/2024    ALT 22 05/16/2024    BUN 48 (H) 05/16/2024    ANIONGAP 11 05/16/2024    MG 1.36 (L) 05/16/2024    PHOS 3.4 09/09/2020     09/10/2021    ALBUMIN 2.8 (L) 05/16/2024    AMYLASE 44 08/23/2020    LIPASE 15 08/23/2020    EGFR 36 (L) 05/16/2024     Lab Results   Component Value Date    TRIG 54 11/21/2023    CHOL 62 11/21/2023    HDL 31.1 11/21/2023     Lab Results   Component Value Date    HGBA1C 5.4 05/08/2024         Assessment/Plan   Problem List Items Addressed This Visit       Diabetes mellitus type 2 without retinopathy (Multi)    Congestive heart failure (Multi)     General Patient Discussion  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 Patient states he will follow-up with PCP Dr. Zazueta for any  issues.    Plan/Changes   Continue all meds under the continuation of care with the referring provider and clinical pharmacy team    Next PCP Follow-Up  7/30/24    Next Clinical Pharmacy Follow-Up  None at this time.      Maria Luisa Rodriguez PharmD     Verbal consent to manage patient's drug therapy was obtained from the patient. They were informed they may decline to participate or withdraw from participation in pharmacy services at any time.

## 2024-07-08 ENCOUNTER — TELEPHONE (OUTPATIENT)
Dept: PRIMARY CARE | Facility: CLINIC | Age: 89
End: 2024-07-08
Payer: MEDICARE

## 2024-07-08 DIAGNOSIS — I48.0 PAF (PAROXYSMAL ATRIAL FIBRILLATION) (MULTI): ICD-10-CM

## 2024-07-08 DIAGNOSIS — I50.9 CONGESTIVE HEART FAILURE, UNSPECIFIED HF CHRONICITY, UNSPECIFIED HEART FAILURE TYPE (MULTI): Primary | ICD-10-CM

## 2024-07-08 RX ORDER — TORSEMIDE 20 MG/1
TABLET ORAL
COMMUNITY
Start: 2024-07-08

## 2024-07-08 RX ORDER — DAPAGLIFLOZIN 5 MG/1
5 TABLET, FILM COATED ORAL DAILY
COMMUNITY
Start: 2024-07-08

## 2024-07-08 RX ORDER — AMLODIPINE BESYLATE 5 MG/1
2.5 TABLET ORAL DAILY
COMMUNITY
Start: 2024-07-08

## 2024-07-08 NOTE — PROGRESS NOTES
Patient seen by cardiology today Dr. Hernandez started on Farxiga 5 mg daily  Patient Demadex now 20 mg 3-4 times a week as needed for leg edema  Amlodipine decreased to only 2.5 mg daily discussed with patient risk and benefits will monitor electrolytes closely arrange with BMP next appointment

## 2024-07-08 NOTE — TELEPHONE ENCOUNTER
Patient seen today by Dr. Hernandez started on Farxiga 5 mg for cardiomyopathy discussed with patient risk of benefit patient would benefit from Farxiga 5 mg  Assessment plan continue Farxiga 5 mg daily  Continue Demadex 20 mg 3-4 times a week  Agree with cutting down on the amlodipine to take only 2.5 mg daily  Arrange with blood work next appointment

## 2024-07-30 ENCOUNTER — APPOINTMENT (OUTPATIENT)
Dept: PRIMARY CARE | Facility: CLINIC | Age: 89
End: 2024-07-30
Payer: MEDICARE

## 2024-07-30 ENCOUNTER — LAB (OUTPATIENT)
Dept: LAB | Facility: LAB | Age: 89
End: 2024-07-30
Payer: MEDICARE

## 2024-07-30 VITALS
OXYGEN SATURATION: 97 % | TEMPERATURE: 96.4 F | SYSTOLIC BLOOD PRESSURE: 112 MMHG | WEIGHT: 152.6 LBS | BODY MASS INDEX: 25.39 KG/M2 | HEART RATE: 59 BPM | DIASTOLIC BLOOD PRESSURE: 62 MMHG

## 2024-07-30 DIAGNOSIS — I48.0 PAF (PAROXYSMAL ATRIAL FIBRILLATION) (MULTI): ICD-10-CM

## 2024-07-30 DIAGNOSIS — I25.10 CORONARY ARTERY ARTERIOSCLEROSIS: ICD-10-CM

## 2024-07-30 DIAGNOSIS — E11.69 TYPE 2 DIABETES MELLITUS WITH OTHER SPECIFIED COMPLICATION, WITHOUT LONG-TERM CURRENT USE OF INSULIN (MULTI): ICD-10-CM

## 2024-07-30 DIAGNOSIS — I50.9 CONGESTIVE HEART FAILURE, UNSPECIFIED HF CHRONICITY, UNSPECIFIED HEART FAILURE TYPE (MULTI): ICD-10-CM

## 2024-07-30 DIAGNOSIS — E78.00 HYPERCHOLESTEREMIA: Primary | ICD-10-CM

## 2024-07-30 DIAGNOSIS — C83.00 LYMPHOPLASMACYTIC LYMPHOMA (MULTI): ICD-10-CM

## 2024-07-30 DIAGNOSIS — E78.00 HYPERCHOLESTEREMIA: ICD-10-CM

## 2024-07-30 PROBLEM — C90.00 MULTIPLE MYELOMA (MULTI): Status: RESOLVED | Noted: 2020-04-24 | Resolved: 2024-07-30

## 2024-07-30 LAB
CHOLEST SERPL-MCNC: 65 MG/DL (ref 0–199)
CHOLESTEROL/HDL RATIO: 1.8
HDLC SERPL-MCNC: 36 MG/DL
LDLC SERPL CALC-MCNC: 19 MG/DL
NON HDL CHOLESTEROL: 29 MG/DL (ref 0–149)
TRIGL SERPL-MCNC: 50 MG/DL (ref 0–149)
VLDL: 10 MG/DL (ref 0–40)

## 2024-07-30 PROCEDURE — 1159F MED LIST DOCD IN RCRD: CPT | Performed by: INTERNAL MEDICINE

## 2024-07-30 PROCEDURE — 1157F ADVNC CARE PLAN IN RCRD: CPT | Performed by: INTERNAL MEDICINE

## 2024-07-30 PROCEDURE — 3078F DIAST BP <80 MM HG: CPT | Performed by: INTERNAL MEDICINE

## 2024-07-30 PROCEDURE — 3074F SYST BP LT 130 MM HG: CPT | Performed by: INTERNAL MEDICINE

## 2024-07-30 PROCEDURE — 99213 OFFICE O/P EST LOW 20 MIN: CPT | Performed by: INTERNAL MEDICINE

## 2024-07-30 PROCEDURE — 1160F RVW MEDS BY RX/DR IN RCRD: CPT | Performed by: INTERNAL MEDICINE

## 2024-07-30 PROCEDURE — 1036F TOBACCO NON-USER: CPT | Performed by: INTERNAL MEDICINE

## 2024-07-30 RX ORDER — AMLODIPINE BESYLATE 2.5 MG/1
1 TABLET ORAL
COMMUNITY
Start: 2024-07-08

## 2024-07-30 RX ORDER — DAPAGLIFLOZIN 10 MG/1
10 TABLET, FILM COATED ORAL
COMMUNITY
Start: 2024-07-10

## 2024-07-30 ASSESSMENT — ENCOUNTER SYMPTOMS
HEADACHES: 0
PALPITATIONS: 0
BRUISES/BLEEDS EASILY: 0
ABDOMINAL PAIN: 0
DIZZINESS: 0
ARTHRALGIAS: 0
FEVER: 0
DIARRHEA: 0
UNEXPECTED WEIGHT CHANGE: 0
FATIGUE: 0
SORE THROAT: 0
SINUS PAIN: 0
WHEEZING: 0
COUGH: 0
BLOOD IN STOOL: 0
DIFFICULTY URINATING: 0

## 2024-07-30 NOTE — PROGRESS NOTES
Subjective   Patient ID: Shady Becerril is a 89 y.o. male who presents for Blood work (Patient needs lipid panel).    - Patient seen recently by cardiology amlodipine lowered to 2.5 mg daily controlled  -Leg edema CHF continue Demadex 20 mg 3 times a week  -CHF patient on Farxiga 5 mg daily no medication side effect  - Hypercholesterolemia need to proceed with lipid profile  -Pneumonia resolving  -diabetes controlled on low-carb diet continue with current medication  -CHF compensated continue with Demadex  -Coronary artery disease compensated  - Hypoxemia compensated  -Lymphoplasmacytic lymphoma follow-up oncology continue current medication close monitoring every 3 months by hematology  - Coronary artery disease history of bypass surgery compensated  - Chronic renal insufficiency stable  Reevaluate patient in 3 months                   Review of Systems   Constitutional:  Negative for fatigue, fever and unexpected weight change.   HENT:  Negative for congestion, ear discharge, ear pain, mouth sores, sinus pain and sore throat.    Eyes:  Negative for visual disturbance.   Respiratory:  Negative for cough and wheezing.    Cardiovascular:  Negative for chest pain, palpitations and leg swelling.   Gastrointestinal:  Negative for abdominal pain, blood in stool and diarrhea.   Genitourinary:  Negative for difficulty urinating.   Musculoskeletal:  Negative for arthralgias.   Skin:  Negative for rash.   Neurological:  Negative for dizziness and headaches.   Hematological:  Does not bruise/bleed easily.   Psychiatric/Behavioral:  Negative for behavioral problems.    All other systems reviewed and are negative.      Objective   Lab Results   Component Value Date    HGBA1C 5.4 05/08/2024      /62   Pulse 59   Temp 35.8 °C (96.4 °F)   Wt 69.2 kg (152 lb 9.6 oz)   SpO2 97%   BMI 25.39 kg/m²     Physical Exam    Assessment/Plan   Shady was seen today for blood work.  Diagnoses and all orders for this  visit:  Hypercholesteremia (Primary)  -     Lipid Panel; Future  PAF (paroxysmal atrial fibrillation) (Multi)  Lymphoplasmacytic lymphoma (Multi)  Congestive heart failure, unspecified HF chronicity, unspecified heart failure type (Multi)  Type 2 diabetes mellitus with other specified complication, without long-term current use of insulin (Multi)  Coronary artery arteriosclerosis  Other orders  -     Follow Up In Primary Care - Established; Future   - Patient seen recently by cardiology amlodipine lowered to 2.5 mg daily controlled  -Leg edema CHF continue Demadex 20 mg 3 times a week  -CHF patient on Farxiga 5 mg daily no medication side effect  - Hypercholesterolemia need to proceed with lipid profile  -Pneumonia resolving  -diabetes controlled on low-carb diet continue with current medication  -CHF compensated continue with Demadex  -Coronary artery disease compensated  - Hypoxemia compensated  -Lymphoplasmacytic lymphoma follow-up oncology continue current medication close monitoring every 3 months by hematology  - Coronary artery disease history of bypass surgery compensated  - Chronic renal insufficiency stable  Reevaluate patient in 3 months

## 2024-08-24 DIAGNOSIS — K27.9 PUD (PEPTIC ULCER DISEASE): ICD-10-CM

## 2024-08-26 RX ORDER — PANTOPRAZOLE SODIUM 40 MG/1
40 TABLET, DELAYED RELEASE ORAL DAILY
Qty: 90 TABLET | Refills: 1 | Status: SHIPPED | OUTPATIENT
Start: 2024-08-26

## 2024-08-31 DIAGNOSIS — I25.10 CORONARY ARTERY ARTERIOSCLEROSIS: ICD-10-CM

## 2024-09-03 RX ORDER — CARVEDILOL 25 MG/1
TABLET ORAL
Qty: 180 TABLET | Refills: 1 | Status: SHIPPED | OUTPATIENT
Start: 2024-09-03

## 2024-09-17 ENCOUNTER — HOSPITAL ENCOUNTER (OUTPATIENT)
Dept: RADIOLOGY | Facility: HOSPITAL | Age: 89
Discharge: HOME | End: 2024-09-17
Payer: MEDICARE

## 2024-09-17 DIAGNOSIS — I50.32 CHRONIC DIASTOLIC (CONGESTIVE) HEART FAILURE: ICD-10-CM

## 2024-09-17 PROCEDURE — 71046 X-RAY EXAM CHEST 2 VIEWS: CPT | Performed by: RADIOLOGY

## 2024-09-17 PROCEDURE — 71046 X-RAY EXAM CHEST 2 VIEWS: CPT

## 2024-09-20 ENCOUNTER — TELEPHONE (OUTPATIENT)
Dept: PRIMARY CARE | Facility: CLINIC | Age: 89
End: 2024-09-20
Payer: MEDICARE

## 2024-09-20 DIAGNOSIS — J90 PLEURAL EFFUSION: ICD-10-CM

## 2024-09-20 NOTE — TELEPHONE ENCOUNTER
Had chest xray with cardiologist that found fluid in both lungs.  Patient is requesting referral to Interventional Radiologist

## 2024-09-26 ENCOUNTER — HOSPITAL ENCOUNTER (OUTPATIENT)
Dept: RADIOLOGY | Facility: HOSPITAL | Age: 89
Discharge: HOME | End: 2024-09-26
Payer: MEDICARE

## 2024-09-26 VITALS
OXYGEN SATURATION: 97 % | HEART RATE: 50 BPM | DIASTOLIC BLOOD PRESSURE: 66 MMHG | RESPIRATION RATE: 18 BRPM | SYSTOLIC BLOOD PRESSURE: 132 MMHG

## 2024-09-26 DIAGNOSIS — J90 PLEURAL EFFUSION: ICD-10-CM

## 2024-09-26 PROCEDURE — 32555 ASPIRATE PLEURA W/ IMAGING: CPT

## 2024-09-26 ASSESSMENT — PAIN SCALES - GENERAL
PAINLEVEL_OUTOF10: 0 - NO PAIN
PAINLEVEL_OUTOF10: 0 - NO PAIN

## 2024-09-26 NOTE — PROGRESS NOTES
Shady Becerril is a 89 y.o. male on day 0 of admission presenting with No Principal Problem: There is no principal problem currently on the Problem List. Please update the Problem List and refresh..    Subjective   ***       Objective     Physical Exam    Last Recorded Vitals  There were no vitals taken for this visit.  Intake/Output last 3 Shifts:  No intake/output data recorded.    Relevant Results  {If you would like to pull in Medications, type .meds     If you would like to pull in Lab results for the last 24 hours, type .[id number]    If you would like to pull in Imaging results, type .[id number] :99}    {Link to Stroke Scoring tools - Link :99}         This patient has a central line   Reason for the central line remaining today? {Central Line Risk Screen:69204}                 Assessment/Plan   Assessment & Plan    ***     {This patient does not have an ACP note on file for this encounter, please fill one out - Advance Care Planning Activity :99}      Denisse Dickinson MT

## 2024-09-26 NOTE — DISCHARGE INSTRUCTIONS
Take it easy today.  No heavy lifting. Nothing heavier than a gallon of milk.  May drive.  Remove dressing tomorrow and you may shower tomorrow.  May take tylenol as needed for pain.  Follow up with Dr. Zazueta    In 10 days

## 2025-01-20 ENCOUNTER — APPOINTMENT (OUTPATIENT)
Dept: PRIMARY CARE | Facility: CLINIC | Age: OVER 89
End: 2025-01-20
Payer: MEDICARE

## 2025-01-20 VITALS
OXYGEN SATURATION: 98 % | BODY MASS INDEX: 26.46 KG/M2 | WEIGHT: 143.8 LBS | DIASTOLIC BLOOD PRESSURE: 58 MMHG | TEMPERATURE: 97.1 F | HEIGHT: 62 IN | SYSTOLIC BLOOD PRESSURE: 102 MMHG | HEART RATE: 57 BPM

## 2025-01-20 DIAGNOSIS — I50.9 CONGESTIVE HEART FAILURE, UNSPECIFIED HF CHRONICITY, UNSPECIFIED HEART FAILURE TYPE: ICD-10-CM

## 2025-01-20 DIAGNOSIS — E55.9 VITAMIN D DEFICIENCY, UNSPECIFIED: ICD-10-CM

## 2025-01-20 DIAGNOSIS — Z00.00 ROUTINE GENERAL MEDICAL EXAMINATION AT HEALTH CARE FACILITY: Primary | ICD-10-CM

## 2025-01-20 DIAGNOSIS — Z79.899 HIGH RISK MEDICATION USE: ICD-10-CM

## 2025-01-20 DIAGNOSIS — I50.33 ACUTE ON CHRONIC DIASTOLIC CONGESTIVE HEART FAILURE: ICD-10-CM

## 2025-01-20 DIAGNOSIS — N18.32 CHRONIC KIDNEY DISEASE, STAGE 3B (MULTI): ICD-10-CM

## 2025-01-20 DIAGNOSIS — I48.0 PAF (PAROXYSMAL ATRIAL FIBRILLATION) (MULTI): ICD-10-CM

## 2025-01-20 DIAGNOSIS — E78.00 HYPERCHOLESTEREMIA: ICD-10-CM

## 2025-01-20 DIAGNOSIS — E11.69 TYPE 2 DIABETES MELLITUS WITH OTHER SPECIFIED COMPLICATION, WITHOUT LONG-TERM CURRENT USE OF INSULIN: ICD-10-CM

## 2025-01-20 DIAGNOSIS — I48.92 ATRIAL FLUTTER, UNSPECIFIED TYPE (MULTI): ICD-10-CM

## 2025-01-20 DIAGNOSIS — E53.8 VITAMIN B12 DEFICIENCY: ICD-10-CM

## 2025-01-20 DIAGNOSIS — C90.01 MULTIPLE MYELOMA IN REMISSION (MULTI): ICD-10-CM

## 2025-01-20 DIAGNOSIS — I10 HYPERTENSION, UNSPECIFIED TYPE: ICD-10-CM

## 2025-01-20 DIAGNOSIS — R53.83 OTHER FATIGUE: ICD-10-CM

## 2025-01-20 PROBLEM — J42 CHRONIC BRONCHITIS, UNSPECIFIED CHRONIC BRONCHITIS TYPE (MULTI): Status: RESOLVED | Noted: 2023-11-21 | Resolved: 2025-01-20

## 2025-01-20 PROCEDURE — 99214 OFFICE O/P EST MOD 30 MIN: CPT | Performed by: INTERNAL MEDICINE

## 2025-01-20 PROCEDURE — 1036F TOBACCO NON-USER: CPT | Performed by: INTERNAL MEDICINE

## 2025-01-20 PROCEDURE — 1159F MED LIST DOCD IN RCRD: CPT | Performed by: INTERNAL MEDICINE

## 2025-01-20 PROCEDURE — 3078F DIAST BP <80 MM HG: CPT | Performed by: INTERNAL MEDICINE

## 2025-01-20 PROCEDURE — 1157F ADVNC CARE PLAN IN RCRD: CPT | Performed by: INTERNAL MEDICINE

## 2025-01-20 PROCEDURE — 1160F RVW MEDS BY RX/DR IN RCRD: CPT | Performed by: INTERNAL MEDICINE

## 2025-01-20 PROCEDURE — 1123F ACP DISCUSS/DSCN MKR DOCD: CPT | Performed by: INTERNAL MEDICINE

## 2025-01-20 PROCEDURE — 1170F FXNL STATUS ASSESSED: CPT | Performed by: INTERNAL MEDICINE

## 2025-01-20 PROCEDURE — 1158F ADVNC CARE PLAN TLK DOCD: CPT | Performed by: INTERNAL MEDICINE

## 2025-01-20 PROCEDURE — 3074F SYST BP LT 130 MM HG: CPT | Performed by: INTERNAL MEDICINE

## 2025-01-20 PROCEDURE — G0439 PPPS, SUBSEQ VISIT: HCPCS | Performed by: INTERNAL MEDICINE

## 2025-01-20 ASSESSMENT — ENCOUNTER SYMPTOMS
SINUS PAIN: 0
BLOOD IN STOOL: 0
ABDOMINAL PAIN: 0
COUGH: 0
PALPITATIONS: 0
ARTHRALGIAS: 0
FEVER: 0
DIARRHEA: 0
SORE THROAT: 0
HEADACHES: 0
DIFFICULTY URINATING: 0
DIZZINESS: 0
WHEEZING: 0
BRUISES/BLEEDS EASILY: 0
FATIGUE: 0
UNEXPECTED WEIGHT CHANGE: 0

## 2025-01-20 ASSESSMENT — ACTIVITIES OF DAILY LIVING (ADL)
GROCERY_SHOPPING: INDEPENDENT
MANAGING_FINANCES: INDEPENDENT
BATHING: INDEPENDENT
TAKING_MEDICATION: INDEPENDENT
DOING_HOUSEWORK: INDEPENDENT
DRESSING: INDEPENDENT

## 2025-01-20 NOTE — PROGRESS NOTES
Subjective   Reason for Visit: Shady Becerril is an 89 y.o. male here for a Medicare Wellness visit.     Past Medical, Surgical, and Family History reviewed and updated in chart.    Reviewed all medications by prescribing practitioner or clinical pharmacist (such as prescriptions, OTCs, herbal therapies and supplements) and documented in the medical record.    Annual preventive visit  - Vaccination reviewed and up-to-date  - Screening for colon cancer reviewed and no need to repeat asymptomatic  -Screen for depression negative  - Advance of directive reviewed  - Recent blood work from May 2024 reviewed    Follow-up  - Diabetes controlled hemoglobin A1c 5.4 random blood sugar from home controlled  -Lymphoplasmacytic lymphoma:  Continue lenalidomide 2.5 mg daily 3 weeks on and 1 week off.  -Anemia:  Follow Hb over time - goal is to keep Hb > 10.0.  Consider recombinant erythropoietin if drops consistently below 10.0.  -MGUS - follow SPEP and lambda/kappa free light chains over time  -IV access:  Mediport draws.  Under care of hematology management  -- Patient seen recently by cardiology amlodipine lowered to 2.5 mg daily controlled  -Leg edema CHF continue Demadex 20 mg 3 times a week  -CHF patient on Farxiga 5 mg daily no medication side effect  - Hypercholesterolemia need to proceed with lipid profile  -diabetes controlled on low-carb diet continue with current medication  -CHF compensated continue with Demadex  -Coronary artery disease compensated  - Hypoxemia compensated  - Coronary artery disease history of bypass surgery compensated  - Chronic renal insufficiency stable  Follow-up 6 months          Patient Care Team:  Juliette Zazueta MD as PCP - General  Jluiette Zazueta MD as PCP [facility]ributed [facility]     Review of Systems   Constitutional:  Negative for fatigue, fever and unexpected weight change.   HENT:  Negative for congestion, ear discharge, ear pain, mouth sores, sinus pain and sore  "throat.    Eyes:  Negative for visual disturbance.   Respiratory:  Negative for cough and wheezing.    Cardiovascular:  Negative for chest pain, palpitations and leg swelling.   Gastrointestinal:  Negative for abdominal pain, blood in stool and diarrhea.   Genitourinary:  Negative for difficulty urinating.   Musculoskeletal:  Negative for arthralgias.   Skin:  Negative for rash.   Neurological:  Negative for dizziness and headaches.   Hematological:  Does not bruise/bleed easily.   Psychiatric/Behavioral:  Negative for behavioral problems.    All other systems reviewed and are negative.      Objective   Vitals:  /58   Pulse 57   Temp 36.2 °C (97.1 °F)   Ht 1.575 m (5' 2\")   Wt 65.2 kg (143 lb 12.8 oz)   SpO2 98%   BMI 26.30 kg/m²       Physical Exam  Vitals and nursing note reviewed.   Constitutional:       Appearance: Normal appearance.   HENT:      Head: Normocephalic.      Nose: Nose normal.   Eyes:      Conjunctiva/sclera: Conjunctivae normal.      Pupils: Pupils are equal, round, and reactive to light.   Cardiovascular:      Rate and Rhythm: Regular rhythm.   Pulmonary:      Effort: Pulmonary effort is normal.      Breath sounds: Normal breath sounds.   Abdominal:      General: Abdomen is flat.      Palpations: Abdomen is soft.   Musculoskeletal:      Cervical back: Neck supple.   Skin:     General: Skin is warm.   Neurological:      General: No focal deficit present.      Mental Status: He is oriented to person, place, and time.   Psychiatric:         Mood and Affect: Mood normal.       Lab Results   Component Value Date    WBC 3.8 (L) 05/16/2024    HGB 10.2 (L) 05/16/2024    HCT 29.9 (L) 05/16/2024     05/16/2024    CHOL 65 07/30/2024    TRIG 50 07/30/2024    HDL 36.0 07/30/2024    ALT 22 05/16/2024    AST 17 05/16/2024     (L) 05/16/2024    K 3.6 05/16/2024    CL 97 (L) 05/16/2024    CREATININE 1.79 (H) 05/16/2024    BUN 48 (H) 05/16/2024    CO2 30 05/16/2024    TSH 3.15 09/13/2021 "    INR 1.3 (H) 05/12/2024    HGBA1C 5.4 05/08/2024     par   Assessment & Plan  Routine general medical examination at health care facility    Orders:    1 Year Follow Up In Primary Care - Wellness Exam; Future    High risk medication use    Orders:    CBC and Auto Differential; Future    Hypertension, unspecified type    Orders:    Comprehensive Metabolic Panel; Future    Hypercholesteremia    Orders:    Lipid Panel; Future    Other fatigue    Orders:    TSH with reflex to Free T4 if abnormal; Future    Vitamin B12 deficiency    Orders:    Vitamin B12; Future    Vitamin D deficiency, unspecified    Orders:    Vitamin D 25-Hydroxy,Total (for eval of Vitamin D levels); Future    Type 2 diabetes mellitus with other specified complication, without long-term current use of insulin    Orders:    Hemoglobin A1C; Future    Acute on chronic diastolic congestive heart failure         Atrial flutter, unspecified type (Multi)         Multiple myeloma in remission (Multi)         Chronic kidney disease, stage 3b (Multi)         PAF (paroxysmal atrial fibrillation) (Multi)         Congestive heart failure, unspecified HF chronicity, unspecified heart failure type          Annual preventive visit  - Vaccination reviewed and up-to-date  - Screening for colon cancer reviewed and no need to repeat asymptomatic  -Screen for depression negative  - Advance of directive reviewed  - Recent blood work from May 2024 reviewed    Follow-up  - Diabetes controlled hemoglobin A1c 5.4 random blood sugar from home controlled  -Lymphoplasmacytic lymphoma:  Continue lenalidomide 2.5 mg daily 3 weeks on and 1 week off.  -Anemia:  Follow Hb over time - goal is to keep Hb > 10.0.  Consider recombinant erythropoietin if drops consistently below 10.0.  -MGUS - follow SPEP and lambda/kappa free light chains over time  -IV access:  Mediport draws.  Under care of hematology management  -- Patient seen recently by cardiology amlodipine lowered to 2.5 mg  daily controlled  -Leg edema CHF continue Demadex 20 mg 3 times a week  -CHF patient on Farxiga 5 mg daily no medication side effect  - Hypercholesterolemia need to proceed with lipid profile  -diabetes controlled on low-carb diet continue with current medication  -CHF compensated continue with Demadex  -Coronary artery disease compensated  - Hypoxemia compensated  - Coronary artery disease history of bypass surgery compensated  - Chronic renal insufficiency stable  Follow-up 6 months

## 2025-02-03 ENCOUNTER — APPOINTMENT (OUTPATIENT)
Dept: PRIMARY CARE | Facility: CLINIC | Age: OVER 89
End: 2025-02-03
Payer: MEDICARE

## 2025-02-11 ENCOUNTER — OFFICE VISIT (OUTPATIENT)
Dept: PRIMARY CARE | Facility: CLINIC | Age: OVER 89
End: 2025-02-11
Payer: MEDICARE

## 2025-02-11 VITALS
OXYGEN SATURATION: 96 % | DIASTOLIC BLOOD PRESSURE: 62 MMHG | BODY MASS INDEX: 25.42 KG/M2 | HEART RATE: 59 BPM | SYSTOLIC BLOOD PRESSURE: 126 MMHG | TEMPERATURE: 97.5 F | WEIGHT: 139 LBS

## 2025-02-11 DIAGNOSIS — H61.23 BILATERAL HEARING LOSS DUE TO CERUMEN IMPACTION: Primary | ICD-10-CM

## 2025-02-11 PROCEDURE — 1159F MED LIST DOCD IN RCRD: CPT | Performed by: INTERNAL MEDICINE

## 2025-02-11 PROCEDURE — 1036F TOBACCO NON-USER: CPT | Performed by: INTERNAL MEDICINE

## 2025-02-11 PROCEDURE — 69210 REMOVE IMPACTED EAR WAX UNI: CPT | Performed by: INTERNAL MEDICINE

## 2025-02-11 PROCEDURE — 3078F DIAST BP <80 MM HG: CPT | Performed by: INTERNAL MEDICINE

## 2025-02-11 PROCEDURE — 99213 OFFICE O/P EST LOW 20 MIN: CPT | Performed by: INTERNAL MEDICINE

## 2025-02-11 PROCEDURE — 1160F RVW MEDS BY RX/DR IN RCRD: CPT | Performed by: INTERNAL MEDICINE

## 2025-02-11 PROCEDURE — 3074F SYST BP LT 130 MM HG: CPT | Performed by: INTERNAL MEDICINE

## 2025-02-11 PROCEDURE — 1157F ADVNC CARE PLAN IN RCRD: CPT | Performed by: INTERNAL MEDICINE

## 2025-02-11 ASSESSMENT — ENCOUNTER SYMPTOMS
ARTHRALGIAS: 0
ABDOMINAL PAIN: 0
HEADACHES: 0
DIARRHEA: 0
SINUS PAIN: 0
BLOOD IN STOOL: 0
WHEEZING: 0
FEVER: 0
UNEXPECTED WEIGHT CHANGE: 0
FATIGUE: 0
DIFFICULTY URINATING: 0
SORE THROAT: 0
BRUISES/BLEEDS EASILY: 0
DIZZINESS: 0
PALPITATIONS: 0
COUGH: 0

## 2025-02-11 NOTE — PROGRESS NOTES
Subjective   Patient ID: Shady Becerril is a 89 y.o. male who presents for Cerumen Impaction (Ear lavage, especially left).    Patient comes today diminished hearing bilaterally  Bilateral cerumen plug  -Cerumen plug removed patient counseled about prevention follow-up closely as needed           Review of Systems   Constitutional:  Negative for fatigue, fever and unexpected weight change.   HENT:  Positive for hearing loss. Negative for congestion, ear discharge, ear pain, mouth sores, sinus pain and sore throat.    Eyes:  Negative for visual disturbance.   Respiratory:  Negative for cough and wheezing.    Cardiovascular:  Negative for chest pain, palpitations and leg swelling.   Gastrointestinal:  Negative for abdominal pain, blood in stool and diarrhea.   Genitourinary:  Negative for difficulty urinating.   Musculoskeletal:  Negative for arthralgias.   Skin:  Negative for rash.   Neurological:  Negative for dizziness and headaches.   Hematological:  Does not bruise/bleed easily.   Psychiatric/Behavioral:  Negative for behavioral problems.    All other systems reviewed and are negative.      Objective   Lab Results   Component Value Date    HGBA1C 5.4 05/08/2024      /62   Pulse 59   Temp 36.4 °C (97.5 °F)   Wt 63 kg (139 lb)   SpO2 96%   BMI 25.42 kg/m²     Physical Exam  Vitals and nursing note reviewed.   Constitutional:       Appearance: Normal appearance.   HENT:      Head: Normocephalic.      Right Ear: There is impacted cerumen.      Left Ear: There is impacted cerumen.      Nose: Nose normal.   Eyes:      Conjunctiva/sclera: Conjunctivae normal.      Pupils: Pupils are equal, round, and reactive to light.   Cardiovascular:      Rate and Rhythm: Regular rhythm.   Pulmonary:      Effort: Pulmonary effort is normal.      Breath sounds: Normal breath sounds.   Abdominal:      General: Abdomen is flat.      Palpations: Abdomen is soft.   Musculoskeletal:      Cervical back: Neck supple.    Skin:     General: Skin is warm.   Neurological:      General: No focal deficit present.      Mental Status: He is oriented to person, place, and time.   Psychiatric:         Mood and Affect: Mood normal.         Assessment/Plan   Shady was seen today for cerumen impaction.  Diagnoses and all orders for this visit:  Bilateral hearing loss due to cerumen impaction (Primary)   Ear Cerumen Removal    Date/Time: 2/11/2025 8:57 PM    Performed by: Juliette Zazueta MD  Authorized by: Juliette Zazueta MD    Consent:     Consent obtained:  Verbal    Consent given by:  Patient    Risks discussed:  Bleeding, infection, pain, TM perforation, incomplete removal and dizziness  Universal protocol:     Patient identity confirmed:  Verbally with patient  Procedure details:     Location:  L ear and R ear    Procedure type: curette      Procedure outcomes: cerumen removed    Post-procedure details:     Inspection:  TM intact    Hearing quality:  Normal    Procedure completion:  Tolerated      11-May-2023 23:50

## 2025-02-23 DIAGNOSIS — K27.9 PUD (PEPTIC ULCER DISEASE): ICD-10-CM

## 2025-02-24 RX ORDER — PANTOPRAZOLE SODIUM 40 MG/1
40 TABLET, DELAYED RELEASE ORAL DAILY
Qty: 90 TABLET | Refills: 1 | Status: SHIPPED | OUTPATIENT
Start: 2025-02-24

## 2025-02-28 DIAGNOSIS — I25.10 CORONARY ARTERY ARTERIOSCLEROSIS: ICD-10-CM

## 2025-03-03 RX ORDER — CARVEDILOL 25 MG/1
TABLET ORAL
Qty: 180 TABLET | Refills: 0 | Status: SHIPPED | OUTPATIENT
Start: 2025-03-03

## 2025-03-26 ENCOUNTER — TELEPHONE (OUTPATIENT)
Dept: PRIMARY CARE | Facility: CLINIC | Age: OVER 89
End: 2025-03-26
Payer: MEDICARE

## 2025-03-26 DIAGNOSIS — C83.50 LYMPHOBLASTIC LYMPHOMA, UNSPECIFIED BODY REGION (MULTI): ICD-10-CM

## 2025-03-26 DIAGNOSIS — I48.92 ATRIAL FLUTTER, UNSPECIFIED TYPE (MULTI): ICD-10-CM

## 2025-03-26 DIAGNOSIS — R53.81 PHYSICAL DECONDITIONING: ICD-10-CM

## 2025-03-26 DIAGNOSIS — I50.9 CONGESTIVE HEART FAILURE, UNSPECIFIED HF CHRONICITY, UNSPECIFIED HEART FAILURE TYPE: ICD-10-CM

## 2025-03-26 DIAGNOSIS — J45.50 SEVERE PERSISTENT ASTHMATIC BRONCHITIS WITHOUT COMPLICATION (MULTI): ICD-10-CM

## 2025-03-26 DIAGNOSIS — I50.33 ACUTE ON CHRONIC DIASTOLIC CONGESTIVE HEART FAILURE: ICD-10-CM

## 2025-05-08 ENCOUNTER — INFUSION (OUTPATIENT)
Dept: HEMATOLOGY/ONCOLOGY | Facility: HOSPITAL | Age: OVER 89
End: 2025-05-08
Payer: MEDICARE

## 2025-05-08 ENCOUNTER — OFFICE VISIT (OUTPATIENT)
Dept: PRIMARY CARE | Facility: CLINIC | Age: OVER 89
End: 2025-05-08
Payer: MEDICARE

## 2025-05-08 VITALS
HEART RATE: 56 BPM | SYSTOLIC BLOOD PRESSURE: 95 MMHG | DIASTOLIC BLOOD PRESSURE: 70 MMHG | OXYGEN SATURATION: 98 % | TEMPERATURE: 97.1 F

## 2025-05-08 DIAGNOSIS — Z95.828 PORT-A-CATH IN PLACE: ICD-10-CM

## 2025-05-08 DIAGNOSIS — R41.0 CONFUSION: ICD-10-CM

## 2025-05-08 DIAGNOSIS — N18.32 CHRONIC KIDNEY DISEASE, STAGE 3B (MULTI): ICD-10-CM

## 2025-05-08 DIAGNOSIS — D50.9 IRON DEFICIENCY ANEMIA, UNSPECIFIED IRON DEFICIENCY ANEMIA TYPE: Primary | ICD-10-CM

## 2025-05-08 DIAGNOSIS — C83.00 LYMPHOPLASMACYTIC LYMPHOMA (MULTI): ICD-10-CM

## 2025-05-08 DIAGNOSIS — R53.81 PHYSICAL DECONDITIONING: ICD-10-CM

## 2025-05-08 DIAGNOSIS — I25.10 CORONARY ARTERY ARTERIOSCLEROSIS: ICD-10-CM

## 2025-05-08 DIAGNOSIS — E11.69 TYPE 2 DIABETES MELLITUS WITH OTHER SPECIFIED COMPLICATION, WITHOUT LONG-TERM CURRENT USE OF INSULIN: ICD-10-CM

## 2025-05-08 DIAGNOSIS — I50.9 CONGESTIVE HEART FAILURE, UNSPECIFIED HF CHRONICITY, UNSPECIFIED HEART FAILURE TYPE: ICD-10-CM

## 2025-05-08 LAB
AMORPH CRY #/AREA UR COMP ASSIST: ABNORMAL /HPF
ANION GAP SERPL CALC-SCNC: 11 MMOL/L (ref 10–20)
APPEARANCE UR: CLEAR
BASOPHILS # BLD AUTO: 0.04 X10*3/UL (ref 0–0.1)
BASOPHILS NFR BLD AUTO: 1.2 %
BILIRUB UR STRIP.AUTO-MCNC: NEGATIVE MG/DL
BUN SERPL-MCNC: 36 MG/DL (ref 6–23)
CALCIUM SERPL-MCNC: 7.4 MG/DL (ref 8.6–10.3)
CHLORIDE SERPL-SCNC: 102 MMOL/L (ref 98–107)
CO2 SERPL-SCNC: 26 MMOL/L (ref 21–32)
COLOR UR: YELLOW
CREAT SERPL-MCNC: 1.48 MG/DL (ref 0.5–1.3)
EGFRCR SERPLBLD CKD-EPI 2021: 44 ML/MIN/1.73M*2
EOSINOPHIL # BLD AUTO: 0.04 X10*3/UL (ref 0–0.4)
EOSINOPHIL NFR BLD AUTO: 1.2 %
ERYTHROCYTE [DISTWIDTH] IN BLOOD BY AUTOMATED COUNT: 14.8 % (ref 11.5–14.5)
GLUCOSE SERPL-MCNC: 109 MG/DL (ref 74–99)
GLUCOSE UR STRIP.AUTO-MCNC: NORMAL MG/DL
HCT VFR BLD AUTO: 30.9 % (ref 41–52)
HGB BLD-MCNC: 10.7 G/DL (ref 13.5–17.5)
HOLD SPECIMEN: 293
HYALINE CASTS #/AREA URNS AUTO: ABNORMAL /LPF
IMM GRANULOCYTES # BLD AUTO: 0.02 X10*3/UL (ref 0–0.5)
IMM GRANULOCYTES NFR BLD AUTO: 0.6 % (ref 0–0.9)
KETONES UR STRIP.AUTO-MCNC: NEGATIVE MG/DL
LEUKOCYTE ESTERASE UR QL STRIP.AUTO: NEGATIVE
LYMPHOCYTES # BLD AUTO: 1.07 X10*3/UL (ref 0.8–3)
LYMPHOCYTES NFR BLD AUTO: 31.2 %
MCH RBC QN AUTO: 34.7 PG (ref 26–34)
MCHC RBC AUTO-ENTMCNC: 34.6 G/DL (ref 32–36)
MCV RBC AUTO: 100 FL (ref 80–100)
MONOCYTES # BLD AUTO: 0.54 X10*3/UL (ref 0.05–0.8)
MONOCYTES NFR BLD AUTO: 15.7 %
MUCOUS THREADS #/AREA URNS AUTO: ABNORMAL /LPF
NEUTROPHILS # BLD AUTO: 1.72 X10*3/UL (ref 1.6–5.5)
NEUTROPHILS NFR BLD AUTO: 50.1 %
NITRITE UR QL STRIP.AUTO: NEGATIVE
NRBC BLD-RTO: 0 /100 WBCS (ref 0–0)
PH UR STRIP.AUTO: 5.5 [PH]
PLATELET # BLD AUTO: 140 X10*3/UL (ref 150–450)
POTASSIUM SERPL-SCNC: 3.4 MMOL/L (ref 3.5–5.3)
PROT UR STRIP.AUTO-MCNC: ABNORMAL MG/DL
RBC # BLD AUTO: 3.08 X10*6/UL (ref 4.5–5.9)
RBC # UR STRIP.AUTO: ABNORMAL MG/DL
RBC #/AREA URNS AUTO: ABNORMAL /HPF
SODIUM SERPL-SCNC: 136 MMOL/L (ref 136–145)
SP GR UR STRIP.AUTO: 1.01
UROBILINOGEN UR STRIP.AUTO-MCNC: NORMAL MG/DL
WBC # BLD AUTO: 3.4 X10*3/UL (ref 4.4–11.3)
WBC #/AREA URNS AUTO: ABNORMAL /HPF

## 2025-05-08 PROCEDURE — 1036F TOBACCO NON-USER: CPT | Performed by: INTERNAL MEDICINE

## 2025-05-08 PROCEDURE — 99214 OFFICE O/P EST MOD 30 MIN: CPT | Performed by: INTERNAL MEDICINE

## 2025-05-08 PROCEDURE — 81001 URINALYSIS AUTO W/SCOPE: CPT | Performed by: INTERNAL MEDICINE

## 2025-05-08 PROCEDURE — 85025 COMPLETE CBC W/AUTO DIFF WBC: CPT | Performed by: INTERNAL MEDICINE

## 2025-05-08 PROCEDURE — 36591 DRAW BLOOD OFF VENOUS DEVICE: CPT

## 2025-05-08 PROCEDURE — 80048 BASIC METABOLIC PNL TOTAL CA: CPT | Performed by: INTERNAL MEDICINE

## 2025-05-08 PROCEDURE — G2211 COMPLEX E/M VISIT ADD ON: HCPCS | Performed by: INTERNAL MEDICINE

## 2025-05-08 PROCEDURE — 1159F MED LIST DOCD IN RCRD: CPT | Performed by: INTERNAL MEDICINE

## 2025-05-08 PROCEDURE — 3074F SYST BP LT 130 MM HG: CPT | Performed by: INTERNAL MEDICINE

## 2025-05-08 PROCEDURE — 1160F RVW MEDS BY RX/DR IN RCRD: CPT | Performed by: INTERNAL MEDICINE

## 2025-05-08 PROCEDURE — 3078F DIAST BP <80 MM HG: CPT | Performed by: INTERNAL MEDICINE

## 2025-05-08 PROCEDURE — 2500000004 HC RX 250 GENERAL PHARMACY W/ HCPCS (ALT 636 FOR OP/ED): Mod: JZ | Performed by: INTERNAL MEDICINE

## 2025-05-08 RX ORDER — HEPARIN SODIUM,PORCINE/PF 10 UNIT/ML
50 SYRINGE (ML) INTRAVENOUS AS NEEDED
OUTPATIENT
Start: 2025-05-08

## 2025-05-08 RX ORDER — HEPARIN SODIUM,PORCINE/PF 10 UNIT/ML
50 SYRINGE (ML) INTRAVENOUS AS NEEDED
Status: CANCELLED | OUTPATIENT
Start: 2025-05-08

## 2025-05-08 RX ORDER — HEPARIN 100 UNIT/ML
500 SYRINGE INTRAVENOUS AS NEEDED
Status: DISCONTINUED | OUTPATIENT
Start: 2025-05-08 | End: 2025-05-08 | Stop reason: HOSPADM

## 2025-05-08 RX ORDER — AMLODIPINE BESYLATE 5 MG/1
5 TABLET ORAL
COMMUNITY
Start: 2025-03-17 | End: 2025-05-08 | Stop reason: SINTOL

## 2025-05-08 RX ORDER — HEPARIN 100 UNIT/ML
500 SYRINGE INTRAVENOUS AS NEEDED
OUTPATIENT
Start: 2025-05-08

## 2025-05-08 RX ORDER — HEPARIN 100 UNIT/ML
500 SYRINGE INTRAVENOUS AS NEEDED
Status: CANCELLED | OUTPATIENT
Start: 2025-05-08

## 2025-05-08 RX ADMIN — HEPARIN 500 UNITS: 100 SYRINGE at 15:44

## 2025-05-08 ASSESSMENT — ENCOUNTER SYMPTOMS
UNEXPECTED WEIGHT CHANGE: 0
DIZZINESS: 0
PALPITATIONS: 0
ARTHRALGIAS: 0
DIFFICULTY URINATING: 0
ABDOMINAL PAIN: 0
SINUS PAIN: 0
SORE THROAT: 0
WHEEZING: 0
FATIGUE: 0
FEVER: 0
BLOOD IN STOOL: 0
BRUISES/BLEEDS EASILY: 0
DIARRHEA: 0
HEADACHES: 0
COUGH: 0

## 2025-05-08 NOTE — PROGRESS NOTES
Subjective   Patient ID: Shady Becerril is a 89 y.o. male who presents for No chief complaint on file..    HPI       Review of Systems    Objective   Lab Results   Component Value Date    HGBA1C 6 (H) 04/09/2025      There were no vitals taken for this visit.    Physical Exam    Assessment/Plan   There are no diagnoses linked to this encounter.

## 2025-05-08 NOTE — PROGRESS NOTES
Subjective   Patient ID: Shady Becerril is a 89 y.o. male who presents for Black or Bloody Stool (Bowel movement 3-4 times a day, wife noted black in underwear), Fatigue, Anorexia, and lack of energy.    - Patient brought to the office today by his wife and his son for evaluation  Increased confusion fatigue and tiredness  Patient had black stools yesterday and noticed today in his underwear patient denies any abdominal pain no hematemesis no melena  -Black tarry stool  Stat CBC BMP results came back no change patient reassured  -Worsening renal sufficiency patient cut down on torsemide takes now every other day GFR reviewed improving creatinine now improving continue with current torsemide  - Borderline low blood pressure patient may discontinue amlodipine at this time reevaluate patient in 4 weeks  -Weakness muscle wasting and high risk for for  Consult with home health for eval and monitoring physical therapy  - Diabetes improving NovoLog now hypoglycemia takes insulin 70/30 10 units in a.m.  -Confusion change in mental condition urine test obtained today results came back negative contact patient with results  --Lymphoplasmacytic lymphoma:  Continue lenalidomide 2.5 mg daily 3 weeks on and 1 week off.  -Anemia:  Follow Hb over time - goal is to keep Hb > 10.0.  Consider recombinant erythropoietin if drops consistently below 10.0.  -MGUS - follow SPEP and lambda/kappa free light chains over time  -IV access:  Mediport draws.  Under care of hematology management  -- Patient seen recently by cardiology amlodipine lowered to 2.5 mg daily controlled  -Leg edema CHF continue Demadex 20 mg 3 times a week  -CHF patient on Farxiga 5 mg daily no medication side effect  - Hypercholesterolemia need to proceed with lipid profile  -CHF compensated continue with Demadex  -Coronary artery disease compensated  - Hypoxemia compensated  - Coronary artery disease history of bypass surgery compensated  - Chronic renal  insufficiency stable  Follow-up as needed             Review of Systems   Constitutional:  Negative for fatigue, fever and unexpected weight change.   HENT:  Negative for congestion, ear discharge, ear pain, mouth sores, sinus pain and sore throat.    Eyes:  Negative for visual disturbance.   Respiratory:  Negative for cough and wheezing.    Cardiovascular:  Negative for chest pain, palpitations and leg swelling.   Gastrointestinal:  Negative for abdominal pain, blood in stool and diarrhea.   Genitourinary:  Negative for difficulty urinating.   Musculoskeletal:  Negative for arthralgias.   Skin:  Negative for rash.   Neurological:  Negative for dizziness and headaches.   Hematological:  Does not bruise/bleed easily.   Psychiatric/Behavioral:  Negative for behavioral problems.    All other systems reviewed and are negative.      Objective   Lab Results   Component Value Date    HGBA1C 6 (H) 04/09/2025      BP 95/70   Pulse 56   Temp 36.2 °C (97.1 °F)   SpO2 98%     Physical Exam  Vitals and nursing note reviewed.   Constitutional:       Appearance: Normal appearance.   HENT:      Head: Normocephalic.      Nose: Nose normal.   Eyes:      Conjunctiva/sclera: Conjunctivae normal.      Pupils: Pupils are equal, round, and reactive to light.   Cardiovascular:      Rate and Rhythm: Regular rhythm.   Pulmonary:      Effort: Pulmonary effort is normal.      Breath sounds: Normal breath sounds.   Abdominal:      General: Abdomen is flat.      Palpations: Abdomen is soft.   Musculoskeletal:      Cervical back: Neck supple.   Skin:     General: Skin is warm.   Neurological:      General: No focal deficit present.      Mental Status: He is oriented to person, place, and time.   Psychiatric:         Mood and Affect: Mood normal.         Assessment/Plan   Shady was seen today for black or bloody stool, fatigue, anorexia and lack of energy.  Diagnoses and all orders for this visit:  Iron deficiency anemia, unspecified iron  deficiency anemia type (Primary)  -     CBC and Auto Differential; Future  -     CBC and Auto Differential  Congestive heart failure, unspecified HF chronicity, unspecified heart failure type  -     Basic metabolic panel; Future  -     Basic metabolic panel  Type 2 diabetes mellitus with other specified complication, without long-term current use of insulin  -     Basic metabolic panel; Future  -     Basic metabolic panel  Chronic kidney disease, stage 3b (Multi)  -     Basic metabolic panel; Future  -     Basic metabolic panel  Confusion  -     Urinalysis with Reflex Culture and Microscopic; Future  -     Urinalysis with Reflex Culture and Microscopic  -     Referral to Home Care; Future  Physical deconditioning  -     Referral to Home Care; Future  Lymphoplasmacytic lymphoma (Multi)  -     Referral to Home Care; Future  Coronary artery arteriosclerosis  -     Referral to Home Care; Future    Patient brought to the office today by his wife and his son for evaluation  Increased confusion fatigue and tiredness  Patient had black stools yesterday and noticed today in his underwear patient denies any abdominal pain no hematemesis no melena  -Black tarry stool  Stat CBC BMP results came back no change patient reassured  -Worsening renal sufficiency patient cut down on torsemide takes now every other day GFR reviewed improving creatinine now improving continue with current torsemide  - Borderline low blood pressure patient may discontinue amlodipine at this time reevaluate patient in 4 weeks  -Weakness muscle wasting and high risk for for  Consult with home health for eval and monitoring physical therapy  - Diabetes improving NovoLog now hypoglycemia takes insulin 70/30 10 units in a.m.  -Confusion change in mental condition urine test obtained today results came back negative contact patient with results  --Lymphoplasmacytic lymphoma:  Continue lenalidomide 2.5 mg daily 3 weeks on and 1 week off.  -Anemia:  Follow Hb  over time - goal is to keep Hb > 10.0.  Consider recombinant erythropoietin if drops consistently below 10.0.  -MGUS - follow SPEP and lambda/kappa free light chains over time  -IV access:  Mediport draws.  Under care of hematology management  -- Patient seen recently by cardiology amlodipine lowered to 2.5 mg daily controlled  -Leg edema CHF continue Demadex 20 mg 3 times a week  -CHF patient on Farxiga 5 mg daily no medication side effect  - Hypercholesterolemia need to proceed with lipid profile  -CHF compensated continue with Demadex  -Coronary artery disease compensated  - Hypoxemia compensated  - Coronary artery disease history of bypass surgery compensated  - Chronic renal insufficiency stable  Follow-up as needed

## 2025-05-09 ENCOUNTER — DOCUMENTATION (OUTPATIENT)
Dept: HOME HEALTH SERVICES | Facility: HOME HEALTH | Age: OVER 89
End: 2025-05-09
Payer: MEDICARE

## 2025-05-09 ENCOUNTER — HOME HEALTH ADMISSION (OUTPATIENT)
Dept: HOME HEALTH SERVICES | Facility: HOME HEALTH | Age: OVER 89
End: 2025-05-09
Payer: MEDICARE

## 2025-05-09 NOTE — HH CARE COORDINATION
Home Care received a Referral for Physical Therapy, Home Health Aide, Medical Social Work, and Registered Dietician. We have processed the referral for a Start of Care on 05/10-05/11.     If you have any questions or concerns, please feel free to contact us at [phone]. Follow the prompts, enter your five digit zip code, and you will be directed to your care team on [facility].

## 2025-05-12 ENCOUNTER — HOME CARE VISIT (OUTPATIENT)
Dept: HOME HEALTH SERVICES | Facility: HOME HEALTH | Age: OVER 89
End: 2025-05-12
Payer: MEDICARE

## 2025-05-12 VITALS
DIASTOLIC BLOOD PRESSURE: 60 MMHG | HEART RATE: 60 BPM | SYSTOLIC BLOOD PRESSURE: 104 MMHG | TEMPERATURE: 97.9 F | OXYGEN SATURATION: 98 %

## 2025-05-12 PROCEDURE — 0023 HH SOC

## 2025-05-12 PROCEDURE — 1090000001 HH PPS REVENUE CREDIT

## 2025-05-12 PROCEDURE — 1090000002 HH PPS REVENUE DEBIT

## 2025-05-12 PROCEDURE — 169592 NO-PAY CLAIM PROCEDURE

## 2025-05-12 PROCEDURE — G0151 HHCP-SERV OF PT,EA 15 MIN: HCPCS | Mod: HHH

## 2025-05-12 SDOH — HEALTH STABILITY: PHYSICAL HEALTH: EXERCISE TYPE: LE THER EX

## 2025-05-12 ASSESSMENT — ACTIVITIES OF DAILY LIVING (ADL)
AMBULATION ASSISTANCE ON FLAT SURFACES: 1
AMBULATION ASSISTANCE: CONTACT GUARD ASSIST
AMBULATION_DISTANCE/DURATION_TOLERATED: 80 FT
AMBULATION ASSISTANCE: 1
ENTERING_EXITING_HOME: NEEDS ASSISTANCE
AMBULATION ASSISTANCE: STAND BY ASSIST
PHYSICAL TRANSFERS ASSESSED: 1
CURRENT_FUNCTION: STAND BY ASSIST
AMBULATION ASSISTANCE: ONE PERSON
OASIS_M1830: 03

## 2025-05-12 ASSESSMENT — BALANCE ASSESSMENTS
BALANCE SCORE: 10
TURNING 360 DEGREES STEPS: 1 - CONTINUOUS STEPS
NUDGED: 1 - STAGGERS, GRABS, CATCHES SELF
EYES CLOSED AT MAXIMUM POSITION NUDGED: 0 - UNSTEADY
SITTING BALANCE: 1 - STEADY, SAFE
ARISING SCORE: 1
NUDGED SCORE: 1
STANDING BALANCE: 2 - NARROW STANCE WITHOUT SUPPORT
IMMEDIATE STANDING BALANCE FIRST 5 SECONDS: 1 - STEADY BUT USES WALKER OR OTHER SUPPORT
ARISES: 1 - ABLE, USES ARMS TO HELP
SITTING DOWN: 1 - USES ARMS OR NOT SMOOTH MOTION
ATTEMPTS TO ARISE: 2 - ABLE TO RISE, ONE ATTEMPT

## 2025-05-12 ASSESSMENT — ENCOUNTER SYMPTOMS
FATIGUES EASILY: 1
PAIN: 1
PERSON REPORTING PAIN: PATIENT
MUSCLE WEAKNESS: 1
PAIN LOCATION: NECK
PAIN LOCATION - PAIN SEVERITY: 5/10

## 2025-05-12 ASSESSMENT — GAIT ASSESSMENTS
TRUNK: 1 - NO SWAY BUT FLEXION OF KNEES OR BACK OR SPREADS ARMS WHILE WALKING
GAIT SCORE: 6
STEP CONTINUITY: 1 - STEPS APPEAR CONTINUOUS
PATH: 1 - MILD/MODERATE DEVIATION OR USES WALKING AID
WALKING STANCE: 1 - HEELS ALMOST TOUCHING WHILE WALKING
BALANCE AND GAIT SCORE: 16
PATH SCORE: 1
TRUNK SCORE: 1
INITIATION OF GAIT IMMEDIATELY AFTER GO: 1 - NO HESITANCY
STEP SYMMETRY: 1 - RIGHT AND LEFT STEP LENGTH APPEAR EQUAL

## 2025-05-13 ENCOUNTER — HOME CARE VISIT (OUTPATIENT)
Dept: HOME HEALTH SERVICES | Facility: HOME HEALTH | Age: OVER 89
End: 2025-05-13
Payer: MEDICARE

## 2025-05-13 PROCEDURE — 1090000002 HH PPS REVENUE DEBIT

## 2025-05-13 PROCEDURE — 1090000001 HH PPS REVENUE CREDIT

## 2025-05-13 PROCEDURE — G0155 HHCP-SVS OF CSW,EA 15 MIN: HCPCS | Mod: HHH

## 2025-05-13 SDOH — HEALTH STABILITY: MENTAL HEALTH: STRESS FACTORS COMMENTS: ONGOING CARE NEEDS, MEDICAL NEEDS

## 2025-05-13 SDOH — ECONOMIC STABILITY: HOUSING INSECURITY: ENVIRONMENTAL RISKS: 1

## 2025-05-13 ASSESSMENT — ENCOUNTER SYMPTOMS: AGITATION: 1

## 2025-05-13 ASSESSMENT — ACTIVITIES OF DAILY LIVING (ADL)
LAUNDRY_REQUIRES_ASSISTANCE: 1
SHOPPING_REQUIRES_ASSISTANCE: 1

## 2025-05-14 PROCEDURE — 1090000001 HH PPS REVENUE CREDIT

## 2025-05-14 PROCEDURE — 1090000002 HH PPS REVENUE DEBIT

## 2025-05-15 ENCOUNTER — HOME CARE VISIT (OUTPATIENT)
Dept: HOME HEALTH SERVICES | Facility: HOME HEALTH | Age: OVER 89
End: 2025-05-15
Payer: MEDICARE

## 2025-05-15 VITALS — DIASTOLIC BLOOD PRESSURE: 56 MMHG | SYSTOLIC BLOOD PRESSURE: 104 MMHG | HEART RATE: 56 BPM | TEMPERATURE: 97.8 F

## 2025-05-15 PROCEDURE — G0151 HHCP-SERV OF PT,EA 15 MIN: HCPCS | Mod: HHH

## 2025-05-15 PROCEDURE — 1090000001 HH PPS REVENUE CREDIT

## 2025-05-15 PROCEDURE — G0270 MNT SUBS TX FOR CHANGE DX: HCPCS | Mod: HHH

## 2025-05-15 PROCEDURE — 1090000002 HH PPS REVENUE DEBIT

## 2025-05-15 SDOH — HEALTH STABILITY: PHYSICAL HEALTH: EXERCISE TYPE: LE THER EX

## 2025-05-15 SDOH — HEALTH STABILITY: MENTAL HEALTH: NUTRITION HISTORY: PT HAS FAIR APPETITE, KEEPING BS UNDER CONTROL. EATING SOME DAYS AND NOT OTHERS.

## 2025-05-15 ASSESSMENT — ENCOUNTER SYMPTOMS
MUSCLE WEAKNESS: 1
PERSON REPORTING PAIN: PATIENT
DENIES PAIN: 1

## 2025-05-15 ASSESSMENT — ACTIVITIES OF DAILY LIVING (ADL)
CURRENT_FUNCTION: STAND BY ASSIST
AMBULATION ASSISTANCE: 1
AMBULATION_DISTANCE/DURATION_TOLERATED: 80 FT
AMBULATION ASSISTANCE: STAND BY ASSIST
PHYSICAL TRANSFERS ASSESSED: 1
AMBULATION ASSISTANCE ON FLAT SURFACES: 1

## 2025-05-16 PROCEDURE — 1090000002 HH PPS REVENUE DEBIT

## 2025-05-16 PROCEDURE — 1090000001 HH PPS REVENUE CREDIT

## 2025-05-17 PROCEDURE — 1090000001 HH PPS REVENUE CREDIT

## 2025-05-17 PROCEDURE — 1090000002 HH PPS REVENUE DEBIT

## 2025-05-20 ENCOUNTER — HOME CARE VISIT (OUTPATIENT)
Dept: HOME HEALTH SERVICES | Facility: HOME HEALTH | Age: OVER 89
End: 2025-05-20
Payer: MEDICARE

## 2025-05-20 PROCEDURE — G0157 HHC PT ASSISTANT EA 15: HCPCS | Mod: CQ,HHH

## 2025-05-20 ASSESSMENT — ENCOUNTER SYMPTOMS
DENIES PAIN: 1
PERSON REPORTING PAIN: PATIENT

## 2025-05-22 ENCOUNTER — HOME CARE VISIT (OUTPATIENT)
Dept: HOME HEALTH SERVICES | Facility: HOME HEALTH | Age: OVER 89
End: 2025-05-22
Payer: MEDICARE

## 2025-05-22 PROCEDURE — G0157 HHC PT ASSISTANT EA 15: HCPCS | Mod: CQ,HHH

## 2025-05-22 ASSESSMENT — ENCOUNTER SYMPTOMS
DENIES PAIN: 1
PERSON REPORTING PAIN: PATIENT

## 2025-05-24 DIAGNOSIS — I25.10 CORONARY ARTERY ARTERIOSCLEROSIS: ICD-10-CM

## 2025-05-27 ENCOUNTER — HOME CARE VISIT (OUTPATIENT)
Dept: HOME HEALTH SERVICES | Facility: HOME HEALTH | Age: OVER 89
End: 2025-05-27
Payer: MEDICARE

## 2025-05-27 PROCEDURE — G0321 HH/HSPC RD PHONE VISIT: HCPCS | Mod: HHH

## 2025-05-27 RX ORDER — CARVEDILOL 25 MG/1
25 TABLET ORAL
Qty: 180 TABLET | Refills: 0 | Status: SHIPPED | OUTPATIENT
Start: 2025-05-27

## 2025-05-28 ENCOUNTER — HOME CARE VISIT (OUTPATIENT)
Dept: HOME HEALTH SERVICES | Facility: HOME HEALTH | Age: OVER 89
End: 2025-05-28
Payer: MEDICARE

## 2025-05-28 PROCEDURE — G0157 HHC PT ASSISTANT EA 15: HCPCS | Mod: CQ,HHH

## 2025-05-28 ASSESSMENT — ENCOUNTER SYMPTOMS
PERSON REPORTING PAIN: PATIENT
DENIES PAIN: 1

## 2025-05-30 ENCOUNTER — HOME CARE VISIT (OUTPATIENT)
Dept: HOME HEALTH SERVICES | Facility: HOME HEALTH | Age: OVER 89
End: 2025-05-30
Payer: MEDICARE

## 2025-05-30 PROCEDURE — G0157 HHC PT ASSISTANT EA 15: HCPCS | Mod: CQ,HHH

## 2025-05-30 ASSESSMENT — ENCOUNTER SYMPTOMS
DENIES PAIN: 1
PERSON REPORTING PAIN: PATIENT

## 2025-06-02 ENCOUNTER — HOME CARE VISIT (OUTPATIENT)
Dept: HOME HEALTH SERVICES | Facility: HOME HEALTH | Age: OVER 89
End: 2025-06-02
Payer: MEDICARE

## 2025-06-02 VITALS — SYSTOLIC BLOOD PRESSURE: 100 MMHG | TEMPERATURE: 97.8 F | HEART RATE: 64 BPM | DIASTOLIC BLOOD PRESSURE: 54 MMHG

## 2025-06-02 PROCEDURE — G0151 HHCP-SERV OF PT,EA 15 MIN: HCPCS | Mod: HHH

## 2025-06-02 SDOH — HEALTH STABILITY: PHYSICAL HEALTH: EXERCISE COMMENTS: PT PERFORMS EXERCISES WITH SPOUSE

## 2025-06-02 SDOH — HEALTH STABILITY: PHYSICAL HEALTH: EXERCISE TYPE: LE THER EX

## 2025-06-02 ASSESSMENT — ACTIVITIES OF DAILY LIVING (ADL)
AMBULATION ASSISTANCE: SUPERVISION
HOME_HEALTH_OASIS: 01
AMBULATION ASSISTANCE: 1
AMBULATION_DISTANCE/DURATION_TOLERATED: 150 FT
AMBULATION ASSISTANCE ON FLAT SURFACES: 1
CURRENT_FUNCTION: INDEPENDENT
PHYSICAL TRANSFERS ASSESSED: 1
OASIS_M1830: 02

## 2025-06-02 ASSESSMENT — BALANCE ASSESSMENTS
SITTING BALANCE: 1 - STEADY, SAFE
NUDGED: 2 - STEADY
TURNING 360 DEGREES STEPS: 1 - CONTINUOUS STEPS
EYES CLOSED AT MAXIMUM POSITION NUDGED: 1 - STEADY
IMMEDIATE STANDING BALANCE FIRST 5 SECONDS: 2 - STEADY WITHOUT WALKER OR OTHER SUPPORT
ARISING SCORE: 1
BALANCE SCORE: 14
ATTEMPTS TO ARISE: 2 - ABLE TO RISE, ONE ATTEMPT
SITTING DOWN: 1 - USES ARMS OR NOT SMOOTH MOTION
ARISES: 1 - ABLE, USES ARMS TO HELP
STANDING BALANCE: 2 - NARROW STANCE WITHOUT SUPPORT
NUDGED SCORE: 2

## 2025-06-02 ASSESSMENT — GAIT ASSESSMENTS
BALANCE AND GAIT SCORE: 23
INITIATION OF GAIT IMMEDIATELY AFTER GO: 1 - NO HESITANCY
PATH SCORE: 1
TRUNK: 0 - MARKED SWAY OR USES WALKING AID
PATH: 1 - MILD/MODERATE DEVIATION OR USES WALKING AID
GAIT SCORE: 9
STEP SYMMETRY: 1 - RIGHT AND LEFT STEP LENGTH APPEAR EQUAL
WALKING STANCE: 1 - HEELS ALMOST TOUCHING WHILE WALKING
STEP CONTINUITY: 1 - STEPS APPEAR CONTINUOUS
TRUNK SCORE: 0

## 2025-06-02 ASSESSMENT — ENCOUNTER SYMPTOMS: DENIES PAIN: 1

## 2025-06-03 ENCOUNTER — TELEPHONE (OUTPATIENT)
Dept: PRIMARY CARE | Facility: CLINIC | Age: OVER 89
End: 2025-06-03
Payer: MEDICARE

## 2025-06-29 DIAGNOSIS — J42 CHRONIC BRONCHITIS, UNSPECIFIED CHRONIC BRONCHITIS TYPE (MULTI): ICD-10-CM

## 2025-06-30 RX ORDER — ALBUTEROL SULFATE 90 UG/1
2 INHALANT RESPIRATORY (INHALATION) EVERY 6 HOURS PRN
Qty: 9 G | Refills: 0 | Status: SHIPPED | OUTPATIENT
Start: 2025-06-30

## 2025-07-06 ASSESSMENT — ENCOUNTER SYMPTOMS: SHORTNESS OF BREATH: 1

## 2025-07-07 ENCOUNTER — HOSPITAL ENCOUNTER (OUTPATIENT)
Dept: RADIOLOGY | Facility: HOSPITAL | Age: OVER 89
Discharge: HOME | End: 2025-07-07
Payer: MEDICARE

## 2025-07-07 ENCOUNTER — APPOINTMENT (OUTPATIENT)
Dept: PRIMARY CARE | Facility: CLINIC | Age: OVER 89
End: 2025-07-07
Payer: MEDICARE

## 2025-07-07 VITALS
TEMPERATURE: 96.2 F | HEART RATE: 71 BPM | DIASTOLIC BLOOD PRESSURE: 70 MMHG | BODY MASS INDEX: 24.91 KG/M2 | WEIGHT: 136.2 LBS | OXYGEN SATURATION: 96 % | SYSTOLIC BLOOD PRESSURE: 126 MMHG

## 2025-07-07 DIAGNOSIS — R06.02 SHORTNESS OF BREATH: Primary | ICD-10-CM

## 2025-07-07 DIAGNOSIS — D50.9 IRON DEFICIENCY ANEMIA, UNSPECIFIED IRON DEFICIENCY ANEMIA TYPE: ICD-10-CM

## 2025-07-07 DIAGNOSIS — R06.02 SHORTNESS OF BREATH: ICD-10-CM

## 2025-07-07 DIAGNOSIS — Z79.899 HIGH RISK MEDICATION USE: ICD-10-CM

## 2025-07-07 DIAGNOSIS — I50.9 CONGESTIVE HEART FAILURE, UNSPECIFIED HF CHRONICITY, UNSPECIFIED HEART FAILURE TYPE: ICD-10-CM

## 2025-07-07 DIAGNOSIS — J45.50 SEVERE PERSISTENT ASTHMATIC BRONCHITIS WITHOUT COMPLICATION (MULTI): ICD-10-CM

## 2025-07-07 PROCEDURE — 1160F RVW MEDS BY RX/DR IN RCRD: CPT | Performed by: INTERNAL MEDICINE

## 2025-07-07 PROCEDURE — G2211 COMPLEX E/M VISIT ADD ON: HCPCS | Performed by: INTERNAL MEDICINE

## 2025-07-07 PROCEDURE — 1036F TOBACCO NON-USER: CPT | Performed by: INTERNAL MEDICINE

## 2025-07-07 PROCEDURE — 71046 X-RAY EXAM CHEST 2 VIEWS: CPT | Performed by: RADIOLOGY

## 2025-07-07 PROCEDURE — 3078F DIAST BP <80 MM HG: CPT | Performed by: INTERNAL MEDICINE

## 2025-07-07 PROCEDURE — 71046 X-RAY EXAM CHEST 2 VIEWS: CPT

## 2025-07-07 PROCEDURE — 3074F SYST BP LT 130 MM HG: CPT | Performed by: INTERNAL MEDICINE

## 2025-07-07 PROCEDURE — 99214 OFFICE O/P EST MOD 30 MIN: CPT | Performed by: INTERNAL MEDICINE

## 2025-07-07 PROCEDURE — 1159F MED LIST DOCD IN RCRD: CPT | Performed by: INTERNAL MEDICINE

## 2025-07-07 RX ORDER — IPRATROPIUM BROMIDE AND ALBUTEROL SULFATE 2.5; .5 MG/3ML; MG/3ML
3 SOLUTION RESPIRATORY (INHALATION)
COMMUNITY
Start: 2025-07-07

## 2025-07-07 ASSESSMENT — ENCOUNTER SYMPTOMS
FATIGUE: 0
WHEEZING: 1
COUGH: 0
DIARRHEA: 0
PALPITATIONS: 0
BRUISES/BLEEDS EASILY: 0
FEVER: 0
SHORTNESS OF BREATH: 1
SORE THROAT: 0
SINUS PAIN: 0
BLOOD IN STOOL: 0
DIFFICULTY URINATING: 0
HEADACHES: 0
DIZZINESS: 0
ABDOMINAL PAIN: 0
ARTHRALGIAS: 0
UNEXPECTED WEIGHT CHANGE: 0

## 2025-07-07 ASSESSMENT — PATIENT HEALTH QUESTIONNAIRE - PHQ9
2. FEELING DOWN, DEPRESSED OR HOPELESS: NOT AT ALL
1. LITTLE INTEREST OR PLEASURE IN DOING THINGS: NOT AT ALL
SUM OF ALL RESPONSES TO PHQ9 QUESTIONS 1 AND 2: 0

## 2025-07-07 NOTE — PROGRESS NOTES
Subjective   Patient ID: Shady Becerril is a 89 y.o. male who presents for Wheezing (Been going on a 3 weeks. ).    - Patient comes today complaining of wheezing shortness of breath  Patient cut down on Demadex takes it now every other day  Increased leg edema  Stat chest x-ray showed bilateral pleural effusion no change from previous x-rays  - Patient scheduled for complete blood work tomorrow follow-up results closely  -Patient counseled on increasing Dermatex to take 20 mg daily follow-up results in 3 to 5 days follow-up lab results closely  - Chronic kidney disease stage III moderate follow-up lab results closely  - Diabetes improving NovoLog now hypoglycemia takes insulin 70/30 10 units in a.m.  ---Lymphoplasmacytic lymphoma:  Continue lenalidomide 2.5 mg daily 3 weeks on and 1 week off.  -Anemia:  Follow Hb over time - goal is to keep Hb > 10.0.  Consider recombinant erythropoietin if drops consistently below 10.0.  -MGUS - follow SPEP and lambda/kappa free light chains over time  -IV access:  Mediport draws.  Under care of hematology management  -- Patient seen recently by cardiology amlodipine lowered to 2.5 mg daily controlled  -Leg edema CHF continue Demadex 20 mg 3 times a week  -CHF patient on Farxiga 5 mg daily no medication side effect  - Hypercholesterolemia need to proceed with lipid profile  -CHF compensated continue with Demadex  -Coronary artery disease compensated  - Hypoxemia compensated  - Coronary artery disease history of bypass surgery compensated  Follow-up results closely    Wheezing   Associated symptoms include shortness of breath. Pertinent negatives include no abdominal pain, chest pain, coughing, diarrhea, ear pain, fever, headaches, rash or sore throat.   Shortness of Breath  This is a recurrent problem. The problem occurs daily. Associated symptoms include wheezing. Pertinent negatives include no abdominal pain, chest pain, ear pain, fever, headaches, leg swelling, rash or  sore throat. Nothing aggravates the symptoms.          Review of Systems   Constitutional:  Negative for fatigue, fever and unexpected weight change.   HENT:  Negative for congestion, ear discharge, ear pain, mouth sores, sinus pain and sore throat.    Eyes:  Negative for visual disturbance.   Respiratory:  Positive for shortness of breath and wheezing. Negative for cough.    Cardiovascular:  Negative for chest pain, palpitations and leg swelling.   Gastrointestinal:  Negative for abdominal pain, blood in stool and diarrhea.   Genitourinary:  Negative for difficulty urinating.   Musculoskeletal:  Negative for arthralgias.   Skin:  Negative for rash.   Neurological:  Negative for dizziness and headaches.   Hematological:  Does not bruise/bleed easily.   Psychiatric/Behavioral:  Negative for behavioral problems.    All other systems reviewed and are negative.      Objective   Lab Results   Component Value Date    HGBA1C 6 (H) 04/09/2025      /70   Pulse 71   Temp 35.7 °C (96.2 °F)   Wt 61.8 kg (136 lb 3.2 oz)   SpO2 96%   BMI 24.91 kg/m²   Lab Results   Component Value Date    WBC 3.4 (L) 05/08/2025    HGB 10.7 (L) 05/08/2025    HCT 30.9 (L) 05/08/2025     (L) 05/08/2025    CHOL 65 07/30/2024    TRIG 50 07/30/2024    HDL 36.0 07/30/2024    ALT 22 05/16/2024    AST 17 05/16/2024     05/08/2025    K 3.4 (L) 05/08/2025     05/08/2025    CREATININE 1.48 (H) 05/08/2025    BUN 36 (H) 05/08/2025    CO2 26 05/08/2025    TSH 3.15 09/13/2021    INR 1.3 (H) 05/12/2024    HGBA1C 6 (H) 04/09/2025     par   Physical Exam  Vitals and nursing note reviewed.   Constitutional:       Appearance: Normal appearance.   HENT:      Head: Normocephalic.      Nose: Nose normal.   Eyes:      Conjunctiva/sclera: Conjunctivae normal.      Pupils: Pupils are equal, round, and reactive to light.   Cardiovascular:      Rate and Rhythm: Regular rhythm.   Pulmonary:      Effort: Pulmonary effort is normal.      Breath  sounds: Normal breath sounds.   Abdominal:      General: Abdomen is flat.      Palpations: Abdomen is soft.   Musculoskeletal:      Cervical back: Neck supple.      Right lower leg: Edema present.      Left lower leg: Edema present.   Skin:     General: Skin is warm.   Neurological:      General: No focal deficit present.      Mental Status: He is oriented to person, place, and time.   Psychiatric:         Mood and Affect: Mood normal.         Assessment/Plan   Shady was seen today for wheezing.  Diagnoses and all orders for this visit:  Shortness of breath (Primary)  -     XR chest 2 views; Future  High risk medication use  -     CBC and Auto Differential; Future  -     CBC and Auto Differential  Congestive heart failure, unspecified HF chronicity, unspecified heart failure type  -     Basic metabolic panel; Future  -     Basic metabolic panel  Iron deficiency anemia, unspecified iron deficiency anemia type  -     CBC and Auto Differential; Future  -     CBC and Auto Differential  Severe persistent asthmatic bronchitis without complication (Multi)   - Patient comes today complaining of wheezing shortness of breath  Patient cut down on Demadex takes it now every other day  Increased leg edema  Stat chest x-ray showed bilateral pleural effusion no change from previous x-rays  - Patient scheduled for complete blood work tomorrow follow-up results closely  -Patient counseled on increasing Dermatex to take 20 mg daily follow-up results in 3 to 5 days follow-up lab results closely  - Chronic kidney disease stage III moderate follow-up lab results closely  - Diabetes improving NovoLog now hypoglycemia takes insulin 70/30 10 units in a.m.  ---Lymphoplasmacytic lymphoma:  Continue lenalidomide 2.5 mg daily 3 weeks on and 1 week off.  -Anemia:  Follow Hb over time - goal is to keep Hb > 10.0.  Consider recombinant erythropoietin if drops consistently below 10.0.  -MGUS - follow SPEP and lambda/kappa free light chains  over time  -IV access:  Mediport draws.  Under care of hematology management  -- Patient seen recently by cardiology amlodipine lowered to 2.5 mg daily controlled  -Leg edema CHF continue Demadex 20 mg 3 times a week  -CHF patient on Farxiga 5 mg daily no medication side effect  - Hypercholesterolemia need to proceed with lipid profile  -CHF compensated continue with Demadex  -Coronary artery disease compensated  - Hypoxemia compensated  - Coronary artery disease history of bypass surgery compensated  Follow-up results closely

## 2025-07-14 ENCOUNTER — APPOINTMENT (OUTPATIENT)
Dept: PRIMARY CARE | Facility: CLINIC | Age: OVER 89
End: 2025-07-14
Payer: MEDICARE

## 2025-07-23 DIAGNOSIS — J42 CHRONIC BRONCHITIS, UNSPECIFIED CHRONIC BRONCHITIS TYPE (MULTI): ICD-10-CM

## 2025-07-23 RX ORDER — ALBUTEROL SULFATE 90 UG/1
2 INHALANT RESPIRATORY (INHALATION) EVERY 6 HOURS PRN
Qty: 9 G | Refills: 0 | Status: SHIPPED | OUTPATIENT
Start: 2025-07-23

## 2025-08-15 DIAGNOSIS — J42 CHRONIC BRONCHITIS, UNSPECIFIED CHRONIC BRONCHITIS TYPE (MULTI): ICD-10-CM

## 2025-08-16 DIAGNOSIS — K27.9 PUD (PEPTIC ULCER DISEASE): ICD-10-CM

## 2025-08-17 ENCOUNTER — HOSPITAL ENCOUNTER (EMERGENCY)
Facility: HOSPITAL | Age: OVER 89
Discharge: ANOTHER HOSPITAL | End: 2025-08-18
Attending: EMERGENCY MEDICINE
Payer: MEDICARE

## 2025-08-17 ENCOUNTER — APPOINTMENT (OUTPATIENT)
Dept: RADIOLOGY | Facility: HOSPITAL | Age: OVER 89
End: 2025-08-17
Payer: MEDICARE

## 2025-08-17 DIAGNOSIS — J18.9 PNEUMONIA OF LEFT LOWER LOBE DUE TO INFECTIOUS ORGANISM: ICD-10-CM

## 2025-08-17 DIAGNOSIS — E83.42 HYPOMAGNESEMIA: ICD-10-CM

## 2025-08-17 DIAGNOSIS — J90 BILATERAL PLEURAL EFFUSION: ICD-10-CM

## 2025-08-17 DIAGNOSIS — K63.89 PNEUMATOSIS INTESTINALIS: ICD-10-CM

## 2025-08-17 DIAGNOSIS — R11.2 NAUSEA AND VOMITING, UNSPECIFIED VOMITING TYPE: Primary | ICD-10-CM

## 2025-08-17 DIAGNOSIS — R79.89 ELEVATED TROPONIN: ICD-10-CM

## 2025-08-17 LAB
ANION GAP BLDV CALCULATED.4IONS-SCNC: 10 MMOL/L (ref 10–25)
BASE EXCESS BLDV CALC-SCNC: 7.2 MMOL/L (ref -2–3)
BODY TEMPERATURE: ABNORMAL
CA-I BLDV-SCNC: 1.13 MMOL/L (ref 1.1–1.33)
CHLORIDE BLDV-SCNC: 95 MMOL/L (ref 98–107)
GLUCOSE BLD MANUAL STRIP-MCNC: 283 MG/DL (ref 74–99)
GLUCOSE BLDV-MCNC: 319 MG/DL (ref 74–99)
HCO3 BLDV-SCNC: 34.8 MMOL/L (ref 22–26)
HCT VFR BLD EST: 37 % (ref 41–52)
HGB BLDV-MCNC: 12.2 G/DL (ref 13.5–17.5)
INHALED O2 CONCENTRATION: 21 %
LACTATE BLDV-SCNC: 1.6 MMOL/L (ref 0.4–2)
OXYHGB MFR BLDV: 20.3 % (ref 45–75)
PCO2 BLDV: 63 MM HG (ref 41–51)
PH BLDV: 7.35 PH (ref 7.33–7.43)
PO2 BLDV: 21 MM HG (ref 35–45)
POTASSIUM BLDV-SCNC: 4.2 MMOL/L (ref 3.5–5.3)
SAO2 % BLDV: 20 % (ref 45–75)
SODIUM BLDV-SCNC: 136 MMOL/L (ref 136–145)

## 2025-08-17 PROCEDURE — 96361 HYDRATE IV INFUSION ADD-ON: CPT

## 2025-08-17 PROCEDURE — 83735 ASSAY OF MAGNESIUM: CPT | Performed by: EMERGENCY MEDICINE

## 2025-08-17 PROCEDURE — 84132 ASSAY OF SERUM POTASSIUM: CPT | Mod: 59 | Performed by: EMERGENCY MEDICINE

## 2025-08-17 PROCEDURE — 84484 ASSAY OF TROPONIN QUANT: CPT | Performed by: EMERGENCY MEDICINE

## 2025-08-17 PROCEDURE — 96375 TX/PRO/DX INJ NEW DRUG ADDON: CPT

## 2025-08-17 PROCEDURE — 2500000004 HC RX 250 GENERAL PHARMACY W/ HCPCS (ALT 636 FOR OP/ED): Performed by: EMERGENCY MEDICINE

## 2025-08-17 PROCEDURE — 82947 ASSAY GLUCOSE BLOOD QUANT: CPT

## 2025-08-17 PROCEDURE — 83690 ASSAY OF LIPASE: CPT | Performed by: EMERGENCY MEDICINE

## 2025-08-17 PROCEDURE — 84132 ASSAY OF SERUM POTASSIUM: CPT | Performed by: EMERGENCY MEDICINE

## 2025-08-17 PROCEDURE — 85025 COMPLETE CBC W/AUTO DIFF WBC: CPT | Performed by: EMERGENCY MEDICINE

## 2025-08-17 PROCEDURE — 99285 EMERGENCY DEPT VISIT HI MDM: CPT | Mod: 25 | Performed by: EMERGENCY MEDICINE

## 2025-08-17 RX ORDER — MORPHINE SULFATE 4 MG/ML
2 INJECTION, SOLUTION INTRAMUSCULAR; INTRAVENOUS ONCE
Status: COMPLETED | OUTPATIENT
Start: 2025-08-18 | End: 2025-08-18

## 2025-08-17 RX ORDER — ONDANSETRON HYDROCHLORIDE 2 MG/ML
4 INJECTION, SOLUTION INTRAVENOUS ONCE
Status: COMPLETED | OUTPATIENT
Start: 2025-08-17 | End: 2025-08-17

## 2025-08-17 RX ADMIN — SODIUM CHLORIDE 500 ML: 9 INJECTION, SOLUTION INTRAVENOUS at 23:42

## 2025-08-17 RX ADMIN — ONDANSETRON 4 MG: 2 INJECTION INTRAMUSCULAR; INTRAVENOUS at 23:40

## 2025-08-17 ASSESSMENT — PAIN - FUNCTIONAL ASSESSMENT: PAIN_FUNCTIONAL_ASSESSMENT: 0-10

## 2025-08-17 ASSESSMENT — PAIN SCALES - GENERAL: PAINLEVEL_OUTOF10: 5 - MODERATE PAIN

## 2025-08-17 ASSESSMENT — PAIN DESCRIPTION - LOCATION: LOCATION: ABDOMEN

## 2025-08-18 ENCOUNTER — HOSPITAL ENCOUNTER (INPATIENT)
Facility: HOSPITAL | Age: OVER 89
LOS: 2 days | Discharge: HOME | DRG: 393 | End: 2025-08-20
Attending: INTERNAL MEDICINE | Admitting: HOSPITALIST
Payer: MEDICARE

## 2025-08-18 ENCOUNTER — APPOINTMENT (OUTPATIENT)
Dept: RADIOLOGY | Facility: HOSPITAL | Age: OVER 89
End: 2025-08-18
Payer: MEDICARE

## 2025-08-18 ENCOUNTER — APPOINTMENT (OUTPATIENT)
Dept: CARDIOLOGY | Facility: HOSPITAL | Age: OVER 89
End: 2025-08-18
Payer: MEDICARE

## 2025-08-18 VITALS
HEIGHT: 66 IN | SYSTOLIC BLOOD PRESSURE: 113 MMHG | TEMPERATURE: 98.2 F | HEART RATE: 86 BPM | RESPIRATION RATE: 24 BRPM | OXYGEN SATURATION: 96 % | BODY MASS INDEX: 20.76 KG/M2 | DIASTOLIC BLOOD PRESSURE: 55 MMHG | WEIGHT: 129.19 LBS

## 2025-08-18 DIAGNOSIS — R53.81 DEBILITY: ICD-10-CM

## 2025-08-18 DIAGNOSIS — E11.69 TYPE 2 DIABETES MELLITUS WITH OTHER SPECIFIED COMPLICATION, WITHOUT LONG-TERM CURRENT USE OF INSULIN: ICD-10-CM

## 2025-08-18 DIAGNOSIS — R10.13 ABDOMINAL PAIN, ACUTE, EPIGASTRIC: ICD-10-CM

## 2025-08-18 DIAGNOSIS — K66.8 PNEUMOPERITONEUM: Primary | ICD-10-CM

## 2025-08-18 PROBLEM — K63.89 PNEUMATOSIS INTESTINALIS: Status: ACTIVE | Noted: 2025-08-18

## 2025-08-18 LAB
ALBUMIN SERPL BCP-MCNC: 2.9 G/DL (ref 3.4–5)
ALBUMIN SERPL BCP-MCNC: 3 G/DL (ref 3.4–5)
ALP SERPL-CCNC: 92 U/L (ref 33–136)
ALP SERPL-CCNC: 97 U/L (ref 33–136)
ALT SERPL W P-5'-P-CCNC: 13 U/L (ref 10–52)
ALT SERPL W P-5'-P-CCNC: 16 U/L (ref 10–52)
ANION GAP SERPL CALC-SCNC: 14 MMOL/L (ref 10–20)
ANION GAP SERPL CALC-SCNC: 14 MMOL/L (ref 10–20)
AST SERPL W P-5'-P-CCNC: 15 U/L (ref 9–39)
AST SERPL W P-5'-P-CCNC: 28 U/L (ref 9–39)
BASOPHILS # BLD AUTO: 0.02 X10*3/UL (ref 0–0.1)
BASOPHILS # BLD AUTO: 0.03 X10*3/UL (ref 0–0.1)
BASOPHILS NFR BLD AUTO: 0.2 %
BASOPHILS NFR BLD AUTO: 0.4 %
BILIRUB SERPL-MCNC: 0.7 MG/DL (ref 0–1.2)
BILIRUB SERPL-MCNC: 1 MG/DL (ref 0–1.2)
BUN SERPL-MCNC: 37 MG/DL (ref 6–23)
BUN SERPL-MCNC: 40 MG/DL (ref 6–23)
CALCIUM SERPL-MCNC: 8.6 MG/DL (ref 8.6–10.3)
CALCIUM SERPL-MCNC: 8.8 MG/DL (ref 8.6–10.3)
CARDIAC TROPONIN I PNL SERPL HS: 52 NG/L (ref 0–20)
CARDIAC TROPONIN I PNL SERPL HS: 53 NG/L (ref 0–20)
CARDIAC TROPONIN I PNL SERPL HS: 53 NG/L (ref 0–20)
CHLORIDE SERPL-SCNC: 93 MMOL/L (ref 98–107)
CHLORIDE SERPL-SCNC: 93 MMOL/L (ref 98–107)
CO2 SERPL-SCNC: 30 MMOL/L (ref 21–32)
CO2 SERPL-SCNC: 31 MMOL/L (ref 21–32)
CREAT SERPL-MCNC: 1.8 MG/DL (ref 0.5–1.3)
CREAT SERPL-MCNC: 1.8 MG/DL (ref 0.5–1.3)
EGFRCR SERPLBLD CKD-EPI 2021: 35 ML/MIN/1.73M*2
EGFRCR SERPLBLD CKD-EPI 2021: 35 ML/MIN/1.73M*2
EOSINOPHIL # BLD AUTO: 0.02 X10*3/UL (ref 0–0.4)
EOSINOPHIL # BLD AUTO: 0.14 X10*3/UL (ref 0–0.4)
EOSINOPHIL NFR BLD AUTO: 0.1 %
EOSINOPHIL NFR BLD AUTO: 2.8 %
ERYTHROCYTE [DISTWIDTH] IN BLOOD BY AUTOMATED COUNT: 14.7 % (ref 11.5–14.5)
ERYTHROCYTE [DISTWIDTH] IN BLOOD BY AUTOMATED COUNT: 15.3 % (ref 11.5–14.5)
GLUCOSE BLD MANUAL STRIP-MCNC: 197 MG/DL (ref 74–99)
GLUCOSE BLD MANUAL STRIP-MCNC: 273 MG/DL (ref 74–99)
GLUCOSE SERPL-MCNC: 288 MG/DL (ref 74–99)
GLUCOSE SERPL-MCNC: 323 MG/DL (ref 74–99)
HCT VFR BLD AUTO: 34.1 % (ref 41–52)
HCT VFR BLD AUTO: 36.3 % (ref 41–52)
HGB BLD-MCNC: 11.8 G/DL (ref 13.5–17.5)
HGB BLD-MCNC: 12.4 G/DL (ref 13.5–17.5)
IMM GRANULOCYTES # BLD AUTO: 0.02 X10*3/UL (ref 0–0.5)
IMM GRANULOCYTES # BLD AUTO: 0.07 X10*3/UL (ref 0–0.5)
IMM GRANULOCYTES NFR BLD AUTO: 0.4 % (ref 0–0.9)
IMM GRANULOCYTES NFR BLD AUTO: 0.5 % (ref 0–0.9)
LACTATE SERPL-SCNC: 1.4 MMOL/L (ref 0.4–2)
LACTATE SERPL-SCNC: 2.4 MMOL/L (ref 0.4–2)
LACTATE SERPL-SCNC: 2.6 MMOL/L (ref 0.4–2)
LIPASE SERPL-CCNC: 21 U/L (ref 9–82)
LYMPHOCYTES # BLD AUTO: 0.75 X10*3/UL (ref 0.8–3)
LYMPHOCYTES # BLD AUTO: 1 X10*3/UL (ref 0.8–3)
LYMPHOCYTES NFR BLD AUTO: 19.8 %
LYMPHOCYTES NFR BLD AUTO: 5.4 %
MAGNESIUM SERPL-MCNC: 1.17 MG/DL (ref 1.6–2.4)
MCH RBC QN AUTO: 34.7 PG (ref 26–34)
MCH RBC QN AUTO: 34.9 PG (ref 26–34)
MCHC RBC AUTO-ENTMCNC: 34.2 G/DL (ref 32–36)
MCHC RBC AUTO-ENTMCNC: 34.6 G/DL (ref 32–36)
MCV RBC AUTO: 101 FL (ref 80–100)
MCV RBC AUTO: 102 FL (ref 80–100)
MONOCYTES # BLD AUTO: 0.32 X10*3/UL (ref 0.05–0.8)
MONOCYTES # BLD AUTO: 0.54 X10*3/UL (ref 0.05–0.8)
MONOCYTES NFR BLD AUTO: 3.9 %
MONOCYTES NFR BLD AUTO: 6.3 %
MRSA DNA SPEC QL NAA+PROBE: NOT DETECTED
NEUTROPHILS # BLD AUTO: 12.41 X10*3/UL (ref 1.6–5.5)
NEUTROPHILS # BLD AUTO: 3.55 X10*3/UL (ref 1.6–5.5)
NEUTROPHILS NFR BLD AUTO: 70.3 %
NEUTROPHILS NFR BLD AUTO: 89.9 %
NRBC BLD-RTO: 0 /100 WBCS (ref 0–0)
NRBC BLD-RTO: 0 /100 WBCS (ref 0–0)
PLATELET # BLD AUTO: 137 X10*3/UL (ref 150–450)
PLATELET # BLD AUTO: 143 X10*3/UL (ref 150–450)
POTASSIUM SERPL-SCNC: 4.1 MMOL/L (ref 3.5–5.3)
POTASSIUM SERPL-SCNC: 4.6 MMOL/L (ref 3.5–5.3)
PROCALCITONIN SERPL-MCNC: 0.39 NG/ML
PROT SERPL-MCNC: 7.8 G/DL (ref 6.4–8.2)
PROT SERPL-MCNC: 8 G/DL (ref 6.4–8.2)
Q ONSET: 217 MS
QRS COUNT: 10 BEATS
QRS DURATION: 86 MS
QT INTERVAL: 436 MS
QTC CALCULATION(BAZETT): 436 MS
QTC FREDERICIA: 436 MS
R AXIS: -36 DEGREES
RBC # BLD AUTO: 3.38 X10*6/UL (ref 4.5–5.9)
RBC # BLD AUTO: 3.57 X10*6/UL (ref 4.5–5.9)
SODIUM SERPL-SCNC: 133 MMOL/L (ref 136–145)
SODIUM SERPL-SCNC: 133 MMOL/L (ref 136–145)
T AXIS: 215 DEGREES
T OFFSET: 435 MS
VENTRICULAR RATE: 60 BPM
WBC # BLD AUTO: 13.8 X10*3/UL (ref 4.4–11.3)
WBC # BLD AUTO: 5.1 X10*3/UL (ref 4.4–11.3)

## 2025-08-18 PROCEDURE — 2500000001 HC RX 250 WO HCPCS SELF ADMINISTERED DRUGS (ALT 637 FOR MEDICARE OP): Performed by: EMERGENCY MEDICINE

## 2025-08-18 PROCEDURE — 2500000004 HC RX 250 GENERAL PHARMACY W/ HCPCS (ALT 636 FOR OP/ED): Performed by: HOSPITALIST

## 2025-08-18 PROCEDURE — 83605 ASSAY OF LACTIC ACID: CPT | Performed by: EMERGENCY MEDICINE

## 2025-08-18 PROCEDURE — 36415 COLL VENOUS BLD VENIPUNCTURE: CPT | Performed by: HOSPITALIST

## 2025-08-18 PROCEDURE — 99223 1ST HOSP IP/OBS HIGH 75: CPT | Performed by: HOSPITALIST

## 2025-08-18 PROCEDURE — 2500000002 HC RX 250 W HCPCS SELF ADMINISTERED DRUGS (ALT 637 FOR MEDICARE OP, ALT 636 FOR OP/ED): Performed by: HOSPITALIST

## 2025-08-18 PROCEDURE — 71250 CT THORAX DX C-: CPT | Performed by: RADIOLOGY

## 2025-08-18 PROCEDURE — 74176 CT ABD & PELVIS W/O CONTRAST: CPT | Performed by: RADIOLOGY

## 2025-08-18 PROCEDURE — 94760 N-INVAS EAR/PLS OXIMETRY 1: CPT

## 2025-08-18 PROCEDURE — 84484 ASSAY OF TROPONIN QUANT: CPT | Performed by: EMERGENCY MEDICINE

## 2025-08-18 PROCEDURE — 1200000002 HC GENERAL ROOM WITH TELEMETRY DAILY

## 2025-08-18 PROCEDURE — 96367 TX/PROPH/DG ADDL SEQ IV INF: CPT

## 2025-08-18 PROCEDURE — 82947 ASSAY GLUCOSE BLOOD QUANT: CPT

## 2025-08-18 PROCEDURE — 87640 STAPH A DNA AMP PROBE: CPT | Mod: 59 | Performed by: EMERGENCY MEDICINE

## 2025-08-18 PROCEDURE — 96375 TX/PRO/DX INJ NEW DRUG ADDON: CPT

## 2025-08-18 PROCEDURE — 84145 PROCALCITONIN (PCT): CPT | Mod: GEALAB | Performed by: HOSPITALIST

## 2025-08-18 PROCEDURE — 85025 COMPLETE CBC W/AUTO DIFF WBC: CPT | Performed by: HOSPITALIST

## 2025-08-18 PROCEDURE — 93005 ELECTROCARDIOGRAM TRACING: CPT

## 2025-08-18 PROCEDURE — 2500000001 HC RX 250 WO HCPCS SELF ADMINISTERED DRUGS (ALT 637 FOR MEDICARE OP): Performed by: HOSPITALIST

## 2025-08-18 PROCEDURE — 2500000004 HC RX 250 GENERAL PHARMACY W/ HCPCS (ALT 636 FOR OP/ED): Performed by: EMERGENCY MEDICINE

## 2025-08-18 PROCEDURE — 74176 CT ABD & PELVIS W/O CONTRAST: CPT

## 2025-08-18 PROCEDURE — 96365 THER/PROPH/DIAG IV INF INIT: CPT

## 2025-08-18 PROCEDURE — 83605 ASSAY OF LACTIC ACID: CPT | Performed by: HOSPITALIST

## 2025-08-18 PROCEDURE — 99221 1ST HOSP IP/OBS SF/LOW 40: CPT | Performed by: SURGERY

## 2025-08-18 PROCEDURE — 80053 COMPREHEN METABOLIC PANEL: CPT | Performed by: HOSPITALIST

## 2025-08-18 PROCEDURE — 96366 THER/PROPH/DIAG IV INF ADDON: CPT

## 2025-08-18 RX ORDER — DEXTROSE 50 % IN WATER (D50W) INTRAVENOUS SYRINGE
25
Status: DISCONTINUED | OUTPATIENT
Start: 2025-08-18 | End: 2025-08-20 | Stop reason: HOSPADM

## 2025-08-18 RX ORDER — INSULIN LISPRO 100 [IU]/ML
0-10 INJECTION, SOLUTION INTRAVENOUS; SUBCUTANEOUS
Status: DISCONTINUED | OUTPATIENT
Start: 2025-08-18 | End: 2025-08-20 | Stop reason: HOSPADM

## 2025-08-18 RX ORDER — INSULIN GLARGINE-YFGN 100 [IU]/ML
10 INJECTION, SOLUTION SUBCUTANEOUS NIGHTLY
Status: DISCONTINUED | OUTPATIENT
Start: 2025-08-18 | End: 2025-08-20 | Stop reason: HOSPADM

## 2025-08-18 RX ORDER — ONDANSETRON HYDROCHLORIDE 2 MG/ML
4 INJECTION, SOLUTION INTRAVENOUS EVERY 8 HOURS PRN
Status: DISCONTINUED | OUTPATIENT
Start: 2025-08-18 | End: 2025-08-20 | Stop reason: HOSPADM

## 2025-08-18 RX ORDER — IPRATROPIUM BROMIDE AND ALBUTEROL SULFATE 2.5; .5 MG/3ML; MG/3ML
3 SOLUTION RESPIRATORY (INHALATION) EVERY 6 HOURS PRN
Status: DISCONTINUED | OUTPATIENT
Start: 2025-08-18 | End: 2025-08-20 | Stop reason: HOSPADM

## 2025-08-18 RX ORDER — ONDANSETRON 4 MG/1
4 TABLET, FILM COATED ORAL EVERY 8 HOURS PRN
Status: DISCONTINUED | OUTPATIENT
Start: 2025-08-18 | End: 2025-08-20 | Stop reason: HOSPADM

## 2025-08-18 RX ORDER — NAPROXEN SODIUM 220 MG/1
324 TABLET, FILM COATED ORAL ONCE
Status: COMPLETED | OUTPATIENT
Start: 2025-08-18 | End: 2025-08-18

## 2025-08-18 RX ORDER — ACETAMINOPHEN 325 MG/1
650 TABLET ORAL EVERY 4 HOURS PRN
Status: DISCONTINUED | OUTPATIENT
Start: 2025-08-18 | End: 2025-08-20 | Stop reason: HOSPADM

## 2025-08-18 RX ORDER — LEVOTHYROXINE SODIUM 25 UG/1
25 TABLET ORAL
Status: DISCONTINUED | OUTPATIENT
Start: 2025-08-19 | End: 2025-08-20 | Stop reason: HOSPADM

## 2025-08-18 RX ORDER — MAGNESIUM SULFATE HEPTAHYDRATE 40 MG/ML
2 INJECTION, SOLUTION INTRAVENOUS ONCE
Status: COMPLETED | OUTPATIENT
Start: 2025-08-18 | End: 2025-08-18

## 2025-08-18 RX ORDER — ATORVASTATIN CALCIUM 20 MG/1
20 TABLET, FILM COATED ORAL NIGHTLY
Status: DISCONTINUED | OUTPATIENT
Start: 2025-08-18 | End: 2025-08-20 | Stop reason: HOSPADM

## 2025-08-18 RX ORDER — POLYETHYLENE GLYCOL 3350 17 G/17G
17 POWDER, FOR SOLUTION ORAL DAILY
Status: DISCONTINUED | OUTPATIENT
Start: 2025-08-18 | End: 2025-08-20 | Stop reason: HOSPADM

## 2025-08-18 RX ORDER — MORPHINE SULFATE 4 MG/ML
2 INJECTION, SOLUTION INTRAMUSCULAR; INTRAVENOUS ONCE
Status: COMPLETED | OUTPATIENT
Start: 2025-08-18 | End: 2025-08-18

## 2025-08-18 RX ORDER — FAMOTIDINE 20 MG/1
20 TABLET, FILM COATED ORAL 2 TIMES DAILY
Status: DISCONTINUED | OUTPATIENT
Start: 2025-08-18 | End: 2025-08-20 | Stop reason: HOSPADM

## 2025-08-18 RX ORDER — PANTOPRAZOLE SODIUM 40 MG/1
40 TABLET, DELAYED RELEASE ORAL DAILY
Qty: 90 TABLET | Refills: 1 | Status: SHIPPED | OUTPATIENT
Start: 2025-08-18

## 2025-08-18 RX ORDER — TALC
3 POWDER (GRAM) TOPICAL NIGHTLY PRN
Status: DISCONTINUED | OUTPATIENT
Start: 2025-08-18 | End: 2025-08-20 | Stop reason: HOSPADM

## 2025-08-18 RX ORDER — DEXTROSE MONOHYDRATE AND SODIUM CHLORIDE 5; .45 G/100ML; G/100ML
75 INJECTION, SOLUTION INTRAVENOUS CONTINUOUS
Status: ACTIVE | OUTPATIENT
Start: 2025-08-18 | End: 2025-08-19

## 2025-08-18 RX ORDER — ASPIRIN 81 MG/1
81 TABLET ORAL DAILY
Status: DISCONTINUED | OUTPATIENT
Start: 2025-08-19 | End: 2025-08-20 | Stop reason: HOSPADM

## 2025-08-18 RX ORDER — ENOXAPARIN SODIUM 100 MG/ML
30 INJECTION SUBCUTANEOUS EVERY 24 HOURS
Status: DISCONTINUED | OUTPATIENT
Start: 2025-08-18 | End: 2025-08-20 | Stop reason: HOSPADM

## 2025-08-18 RX ORDER — DEXTROSE 50 % IN WATER (D50W) INTRAVENOUS SYRINGE
12.5
Status: DISCONTINUED | OUTPATIENT
Start: 2025-08-18 | End: 2025-08-20 | Stop reason: HOSPADM

## 2025-08-18 RX ORDER — CARVEDILOL 25 MG/1
25 TABLET ORAL
Status: DISCONTINUED | OUTPATIENT
Start: 2025-08-18 | End: 2025-08-20 | Stop reason: HOSPADM

## 2025-08-18 RX ORDER — PANTOPRAZOLE SODIUM 40 MG/1
40 TABLET, DELAYED RELEASE ORAL
Status: DISCONTINUED | OUTPATIENT
Start: 2025-08-19 | End: 2025-08-20 | Stop reason: HOSPADM

## 2025-08-18 RX ORDER — ALBUTEROL SULFATE 90 UG/1
2 INHALANT RESPIRATORY (INHALATION) EVERY 6 HOURS PRN
Qty: 9 G | Refills: 0 | Status: SHIPPED | OUTPATIENT
Start: 2025-08-18

## 2025-08-18 RX ADMIN — MORPHINE SULFATE 2 MG: 4 INJECTION, SOLUTION INTRAMUSCULAR; INTRAVENOUS at 01:00

## 2025-08-18 RX ADMIN — MORPHINE SULFATE 2 MG: 4 INJECTION, SOLUTION INTRAMUSCULAR; INTRAVENOUS at 00:05

## 2025-08-18 RX ADMIN — ASPIRIN 81 MG CHEWABLE TABLET 324 MG: 81 TABLET CHEWABLE at 01:00

## 2025-08-18 RX ADMIN — SODIUM CHLORIDE, SODIUM LACTATE, POTASSIUM CHLORIDE, AND CALCIUM CHLORIDE 1000 ML: .6; .31; .03; .02 INJECTION, SOLUTION INTRAVENOUS at 11:18

## 2025-08-18 RX ADMIN — INSULIN GLARGINE-YFGN 10 UNITS: 100 INJECTION, SOLUTION SUBCUTANEOUS at 20:30

## 2025-08-18 RX ADMIN — MAGNESIUM SULFATE HEPTAHYDRATE 2 G: 40 INJECTION, SOLUTION INTRAVENOUS at 12:53

## 2025-08-18 RX ADMIN — ATORVASTATIN CALCIUM 20 MG: 20 TABLET, FILM COATED ORAL at 20:30

## 2025-08-18 RX ADMIN — PIPERACILLIN SODIUM AND TAZOBACTAM SODIUM 4.5 G: 4; .5 INJECTION, SOLUTION INTRAVENOUS at 03:30

## 2025-08-18 RX ADMIN — PIPERACILLIN SODIUM AND TAZOBACTAM SODIUM 2.25 G: 2; .25 INJECTION, SOLUTION INTRAVENOUS at 14:43

## 2025-08-18 RX ADMIN — ENOXAPARIN SODIUM 30 MG: 30 INJECTION SUBCUTANEOUS at 20:30

## 2025-08-18 RX ADMIN — INSULIN LISPRO 6 UNITS: 100 INJECTION, SOLUTION INTRAVENOUS; SUBCUTANEOUS at 16:03

## 2025-08-18 RX ADMIN — VANCOMYCIN HYDROCHLORIDE 1.5 G: 1.5 INJECTION, POWDER, LYOPHILIZED, FOR SOLUTION INTRAVENOUS at 04:16

## 2025-08-18 RX ADMIN — PIPERACILLIN SODIUM AND TAZOBACTAM SODIUM 2.25 G: 2; .25 INJECTION, SOLUTION INTRAVENOUS at 20:30

## 2025-08-18 RX ADMIN — MAGNESIUM SULFATE HEPTAHYDRATE 2 G: 40 INJECTION, SOLUTION INTRAVENOUS at 01:04

## 2025-08-18 RX ADMIN — DEXTROSE AND SODIUM CHLORIDE 75 ML/HR: 5; .45 INJECTION, SOLUTION INTRAVENOUS at 16:28

## 2025-08-18 RX ADMIN — FAMOTIDINE 20 MG: 20 TABLET, FILM COATED ORAL at 20:30

## 2025-08-18 SDOH — SOCIAL STABILITY: SOCIAL INSECURITY: WITHIN THE LAST YEAR, HAVE YOU BEEN AFRAID OF YOUR PARTNER OR EX-PARTNER?: NO

## 2025-08-18 SDOH — SOCIAL STABILITY: SOCIAL INSECURITY
WITHIN THE LAST YEAR, HAVE YOU BEEN KICKED, HIT, SLAPPED, OR OTHERWISE PHYSICALLY HURT BY YOUR PARTNER OR EX-PARTNER?: NO

## 2025-08-18 SDOH — SOCIAL STABILITY: SOCIAL INSECURITY: DOES ANYONE TRY TO KEEP YOU FROM HAVING/CONTACTING OTHER FRIENDS OR DOING THINGS OUTSIDE YOUR HOME?: NO

## 2025-08-18 SDOH — ECONOMIC STABILITY: INCOME INSECURITY: IN THE PAST 12 MONTHS HAS THE ELECTRIC, GAS, OIL, OR WATER COMPANY THREATENED TO SHUT OFF SERVICES IN YOUR HOME?: NO

## 2025-08-18 SDOH — ECONOMIC STABILITY: FOOD INSECURITY: WITHIN THE PAST 12 MONTHS, YOU WORRIED THAT YOUR FOOD WOULD RUN OUT BEFORE YOU GOT THE MONEY TO BUY MORE.: NEVER TRUE

## 2025-08-18 SDOH — SOCIAL STABILITY: SOCIAL INSECURITY
WITHIN THE LAST YEAR, HAVE YOU BEEN RAPED OR FORCED TO HAVE ANY KIND OF SEXUAL ACTIVITY BY YOUR PARTNER OR EX-PARTNER?: NO

## 2025-08-18 SDOH — SOCIAL STABILITY: SOCIAL INSECURITY: WITHIN THE LAST YEAR, HAVE YOU BEEN HUMILIATED OR EMOTIONALLY ABUSED IN OTHER WAYS BY YOUR PARTNER OR EX-PARTNER?: NO

## 2025-08-18 SDOH — SOCIAL STABILITY: SOCIAL INSECURITY: DO YOU FEEL ANYONE HAS EXPLOITED OR TAKEN ADVANTAGE OF YOU FINANCIALLY OR OF YOUR PERSONAL PROPERTY?: NO

## 2025-08-18 SDOH — ECONOMIC STABILITY: HOUSING INSECURITY: IN THE PAST 12 MONTHS, HOW MANY TIMES HAVE YOU MOVED WHERE YOU WERE LIVING?: 0

## 2025-08-18 SDOH — ECONOMIC STABILITY: HOUSING INSECURITY: AT ANY TIME IN THE PAST 12 MONTHS, WERE YOU HOMELESS OR LIVING IN A SHELTER (INCLUDING NOW)?: NO

## 2025-08-18 SDOH — ECONOMIC STABILITY: TRANSPORTATION INSECURITY: IN THE PAST 12 MONTHS, HAS LACK OF TRANSPORTATION KEPT YOU FROM MEDICAL APPOINTMENTS OR FROM GETTING MEDICATIONS?: NO

## 2025-08-18 SDOH — SOCIAL STABILITY: SOCIAL INSECURITY: ARE THERE ANY APPARENT SIGNS OF INJURIES/BEHAVIORS THAT COULD BE RELATED TO ABUSE/NEGLECT?: NO

## 2025-08-18 SDOH — SOCIAL STABILITY: SOCIAL INSECURITY: ABUSE: ADULT

## 2025-08-18 SDOH — ECONOMIC STABILITY: HOUSING INSECURITY: IN THE LAST 12 MONTHS, WAS THERE A TIME WHEN YOU WERE NOT ABLE TO PAY THE MORTGAGE OR RENT ON TIME?: NO

## 2025-08-18 SDOH — ECONOMIC STABILITY: FOOD INSECURITY: WITHIN THE PAST 12 MONTHS, THE FOOD YOU BOUGHT JUST DIDN'T LAST AND YOU DIDN'T HAVE MONEY TO GET MORE.: NEVER TRUE

## 2025-08-18 SDOH — ECONOMIC STABILITY: FOOD INSECURITY: HOW HARD IS IT FOR YOU TO PAY FOR THE VERY BASICS LIKE FOOD, HOUSING, MEDICAL CARE, AND HEATING?: NOT HARD AT ALL

## 2025-08-18 SDOH — SOCIAL STABILITY: SOCIAL INSECURITY: DO YOU FEEL UNSAFE GOING BACK TO THE PLACE WHERE YOU ARE LIVING?: NO

## 2025-08-18 SDOH — SOCIAL STABILITY: SOCIAL INSECURITY: WERE YOU ABLE TO COMPLETE ALL THE BEHAVIORAL HEALTH SCREENINGS?: YES

## 2025-08-18 SDOH — SOCIAL STABILITY: SOCIAL INSECURITY: HAVE YOU HAD ANY THOUGHTS OF HARMING ANYONE ELSE?: NO

## 2025-08-18 SDOH — SOCIAL STABILITY: SOCIAL INSECURITY: HAS ANYONE EVER THREATENED TO HURT YOUR FAMILY OR YOUR PETS?: NO

## 2025-08-18 SDOH — SOCIAL STABILITY: SOCIAL INSECURITY: ARE YOU OR HAVE YOU BEEN THREATENED OR ABUSED PHYSICALLY, EMOTIONALLY, OR SEXUALLY BY ANYONE?: NO

## 2025-08-18 SDOH — SOCIAL STABILITY: SOCIAL INSECURITY: HAVE YOU HAD THOUGHTS OF HARMING ANYONE ELSE?: NO

## 2025-08-18 ASSESSMENT — LIFESTYLE VARIABLES
SKIP TO QUESTIONS 9-10: 1
AUDIT-C TOTAL SCORE: 0
HOW OFTEN DO YOU HAVE 6 OR MORE DRINKS ON ONE OCCASION: NEVER
AUDIT-C TOTAL SCORE: 0
HOW MANY STANDARD DRINKS CONTAINING ALCOHOL DO YOU HAVE ON A TYPICAL DAY: PATIENT DOES NOT DRINK
HOW OFTEN DO YOU HAVE A DRINK CONTAINING ALCOHOL: NEVER

## 2025-08-18 ASSESSMENT — COGNITIVE AND FUNCTIONAL STATUS - GENERAL
MOBILITY SCORE: 20
DAILY ACTIVITIY SCORE: 23
STANDING UP FROM CHAIR USING ARMS: A LITTLE
PATIENT BASELINE BEDBOUND: NO
TOILETING: A LITTLE
STANDING UP FROM CHAIR USING ARMS: A LITTLE
CLIMB 3 TO 5 STEPS WITH RAILING: A LITTLE
MOVING TO AND FROM BED TO CHAIR: A LITTLE
TOILETING: A LITTLE
CLIMB 3 TO 5 STEPS WITH RAILING: A LITTLE
MOBILITY SCORE: 20
WALKING IN HOSPITAL ROOM: A LITTLE
WALKING IN HOSPITAL ROOM: A LITTLE
DAILY ACTIVITIY SCORE: 23
MOVING TO AND FROM BED TO CHAIR: A LITTLE
PATIENT BASELINE BEDBOUND: NO

## 2025-08-18 ASSESSMENT — ENCOUNTER SYMPTOMS
VOMITING: 1
NAUSEA: 1
APPETITE CHANGE: 1
DIFFICULTY URINATING: 0
CONSTIPATION: 0
BLOOD IN STOOL: 0
CHILLS: 0
DYSURIA: 0
FATIGUE: 1
COUGH: 0
ABDOMINAL PAIN: 1
TROUBLE SWALLOWING: 0
FEVER: 0
SORE THROAT: 0
SHORTNESS OF BREATH: 0
DIARRHEA: 0
JOINT SWELLING: 0

## 2025-08-18 ASSESSMENT — PAIN - FUNCTIONAL ASSESSMENT: PAIN_FUNCTIONAL_ASSESSMENT: 0-10

## 2025-08-18 ASSESSMENT — PATIENT HEALTH QUESTIONNAIRE - PHQ9
2. FEELING DOWN, DEPRESSED OR HOPELESS: NOT AT ALL
1. LITTLE INTEREST OR PLEASURE IN DOING THINGS: NOT AT ALL
SUM OF ALL RESPONSES TO PHQ9 QUESTIONS 1 & 2: 0

## 2025-08-18 ASSESSMENT — ACTIVITIES OF DAILY LIVING (ADL)
BATHING: INDEPENDENT
PATIENT'S MEMORY ADEQUATE TO SAFELY COMPLETE DAILY ACTIVITIES?: YES
JUDGMENT_ADEQUATE_SAFELY_COMPLETE_DAILY_ACTIVITIES: YES
TOILETING: INDEPENDENT
LACK_OF_TRANSPORTATION: NO
WALKS IN HOME: INDEPENDENT
HEARING - LEFT EAR: FUNCTIONAL
GROOMING: INDEPENDENT
ADEQUATE_TO_COMPLETE_ADL: YES
HEARING - RIGHT EAR: FUNCTIONAL
ASSISTIVE_DEVICE: EYEGLASSES
FEEDING YOURSELF: INDEPENDENT
DRESSING YOURSELF: INDEPENDENT

## 2025-08-18 ASSESSMENT — PAIN SCALES - GENERAL
PAINLEVEL_OUTOF10: 0 - NO PAIN

## 2025-08-19 ENCOUNTER — APPOINTMENT (OUTPATIENT)
Dept: VASCULAR MEDICINE | Facility: HOSPITAL | Age: OVER 89
DRG: 393 | End: 2025-08-19
Payer: MEDICARE

## 2025-08-19 LAB
ALBUMIN SERPL BCP-MCNC: 2.6 G/DL (ref 3.4–5)
ALP SERPL-CCNC: 64 U/L (ref 33–136)
ALT SERPL W P-5'-P-CCNC: 8 U/L (ref 10–52)
ANION GAP SERPL CALC-SCNC: 11 MMOL/L (ref 10–20)
AST SERPL W P-5'-P-CCNC: 12 U/L (ref 9–39)
BASOPHILS # BLD AUTO: 0.03 X10*3/UL (ref 0–0.1)
BASOPHILS NFR BLD AUTO: 0.4 %
BILIRUB SERPL-MCNC: 0.8 MG/DL (ref 0–1.2)
BUN SERPL-MCNC: 37 MG/DL (ref 6–23)
CALCIUM SERPL-MCNC: 8.5 MG/DL (ref 8.6–10.3)
CHLORIDE SERPL-SCNC: 95 MMOL/L (ref 98–107)
CO2 SERPL-SCNC: 29 MMOL/L (ref 21–32)
CREAT SERPL-MCNC: 1.84 MG/DL (ref 0.5–1.3)
CRP SERPL-MCNC: 11.85 MG/DL
EGFRCR SERPLBLD CKD-EPI 2021: 34 ML/MIN/1.73M*2
EOSINOPHIL # BLD AUTO: 0.05 X10*3/UL (ref 0–0.4)
EOSINOPHIL NFR BLD AUTO: 0.6 %
ERYTHROCYTE [DISTWIDTH] IN BLOOD BY AUTOMATED COUNT: 15.6 % (ref 11.5–14.5)
ERYTHROCYTE [SEDIMENTATION RATE] IN BLOOD BY WESTERGREN METHOD: 57 MM/H (ref 0–20)
GLUCOSE BLD MANUAL STRIP-MCNC: 147 MG/DL (ref 74–99)
GLUCOSE BLD MANUAL STRIP-MCNC: 158 MG/DL (ref 74–99)
GLUCOSE BLD MANUAL STRIP-MCNC: 179 MG/DL (ref 74–99)
GLUCOSE BLD MANUAL STRIP-MCNC: 197 MG/DL (ref 74–99)
GLUCOSE SERPL-MCNC: 201 MG/DL (ref 74–99)
HCT VFR BLD AUTO: 31.2 % (ref 41–52)
HGB BLD-MCNC: 10.6 G/DL (ref 13.5–17.5)
IMM GRANULOCYTES # BLD AUTO: 0.03 X10*3/UL (ref 0–0.5)
IMM GRANULOCYTES NFR BLD AUTO: 0.4 % (ref 0–0.9)
LYMPHOCYTES # BLD AUTO: 1.29 X10*3/UL (ref 0.8–3)
LYMPHOCYTES NFR BLD AUTO: 15.3 %
MAGNESIUM SERPL-MCNC: 1.93 MG/DL (ref 1.6–2.4)
MCH RBC QN AUTO: 34.6 PG (ref 26–34)
MCHC RBC AUTO-ENTMCNC: 34 G/DL (ref 32–36)
MCV RBC AUTO: 102 FL (ref 80–100)
MONOCYTES # BLD AUTO: 0.32 X10*3/UL (ref 0.05–0.8)
MONOCYTES NFR BLD AUTO: 3.8 %
NEUTROPHILS # BLD AUTO: 6.69 X10*3/UL (ref 1.6–5.5)
NEUTROPHILS NFR BLD AUTO: 79.5 %
NRBC BLD-RTO: 0 /100 WBCS (ref 0–0)
PHOSPHATE SERPL-MCNC: 3.7 MG/DL (ref 2.5–4.9)
PLATELET # BLD AUTO: 131 X10*3/UL (ref 150–450)
POTASSIUM SERPL-SCNC: 3.5 MMOL/L (ref 3.5–5.3)
PROT SERPL-MCNC: 7 G/DL (ref 6.4–8.2)
RBC # BLD AUTO: 3.06 X10*6/UL (ref 4.5–5.9)
SODIUM SERPL-SCNC: 131 MMOL/L (ref 136–145)
WBC # BLD AUTO: 8.4 X10*3/UL (ref 4.4–11.3)

## 2025-08-19 PROCEDURE — 80053 COMPREHEN METABOLIC PANEL: CPT | Performed by: HOSPITALIST

## 2025-08-19 PROCEDURE — 2500000001 HC RX 250 WO HCPCS SELF ADMINISTERED DRUGS (ALT 637 FOR MEDICARE OP): Performed by: HOSPITALIST

## 2025-08-19 PROCEDURE — 99233 SBSQ HOSP IP/OBS HIGH 50: CPT | Performed by: STUDENT IN AN ORGANIZED HEALTH CARE EDUCATION/TRAINING PROGRAM

## 2025-08-19 PROCEDURE — 82947 ASSAY GLUCOSE BLOOD QUANT: CPT

## 2025-08-19 PROCEDURE — 85652 RBC SED RATE AUTOMATED: CPT | Performed by: INTERNAL MEDICINE

## 2025-08-19 PROCEDURE — 83735 ASSAY OF MAGNESIUM: CPT | Performed by: HOSPITALIST

## 2025-08-19 PROCEDURE — 85025 COMPLETE CBC W/AUTO DIFF WBC: CPT | Performed by: HOSPITALIST

## 2025-08-19 PROCEDURE — 2500000004 HC RX 250 GENERAL PHARMACY W/ HCPCS (ALT 636 FOR OP/ED): Performed by: HOSPITALIST

## 2025-08-19 PROCEDURE — 2500000004 HC RX 250 GENERAL PHARMACY W/ HCPCS (ALT 636 FOR OP/ED): Performed by: STUDENT IN AN ORGANIZED HEALTH CARE EDUCATION/TRAINING PROGRAM

## 2025-08-19 PROCEDURE — 87081 CULTURE SCREEN ONLY: CPT | Mod: GEALAB | Performed by: INTERNAL MEDICINE

## 2025-08-19 PROCEDURE — 84100 ASSAY OF PHOSPHORUS: CPT | Performed by: HOSPITALIST

## 2025-08-19 PROCEDURE — 86140 C-REACTIVE PROTEIN: CPT | Performed by: INTERNAL MEDICINE

## 2025-08-19 PROCEDURE — 97165 OT EVAL LOW COMPLEX 30 MIN: CPT | Mod: GO

## 2025-08-19 PROCEDURE — 36415 COLL VENOUS BLD VENIPUNCTURE: CPT | Performed by: INTERNAL MEDICINE

## 2025-08-19 PROCEDURE — 36415 COLL VENOUS BLD VENIPUNCTURE: CPT | Performed by: HOSPITALIST

## 2025-08-19 PROCEDURE — 99232 SBSQ HOSP IP/OBS MODERATE 35: CPT | Performed by: SURGERY

## 2025-08-19 PROCEDURE — 87075 CULTR BACTERIA EXCEPT BLOOD: CPT | Mod: GEALAB | Performed by: INTERNAL MEDICINE

## 2025-08-19 PROCEDURE — 2500000002 HC RX 250 W HCPCS SELF ADMINISTERED DRUGS (ALT 637 FOR MEDICARE OP, ALT 636 FOR OP/ED): Performed by: HOSPITALIST

## 2025-08-19 PROCEDURE — 1200000002 HC GENERAL ROOM WITH TELEMETRY DAILY

## 2025-08-19 RX ORDER — SODIUM CHLORIDE 0.9 % (FLUSH) 0.9 %
10 SYRINGE (ML) INJECTION AS NEEDED
Status: DISCONTINUED | OUTPATIENT
Start: 2025-08-19 | End: 2025-08-20 | Stop reason: HOSPADM

## 2025-08-19 RX ORDER — DEXTROSE MONOHYDRATE AND SODIUM CHLORIDE 5; .45 G/100ML; G/100ML
50 INJECTION, SOLUTION INTRAVENOUS CONTINUOUS
Status: DISCONTINUED | OUTPATIENT
Start: 2025-08-19 | End: 2025-08-20

## 2025-08-19 RX ORDER — SODIUM CHLORIDE 0.9 % (FLUSH) 0.9 %
10 SYRINGE (ML) INJECTION EVERY 12 HOURS SCHEDULED
Status: DISCONTINUED | OUTPATIENT
Start: 2025-08-19 | End: 2025-08-20 | Stop reason: HOSPADM

## 2025-08-19 RX ADMIN — ATORVASTATIN CALCIUM 20 MG: 20 TABLET, FILM COATED ORAL at 20:20

## 2025-08-19 RX ADMIN — INSULIN GLARGINE-YFGN 10 UNITS: 100 INJECTION, SOLUTION SUBCUTANEOUS at 20:20

## 2025-08-19 RX ADMIN — PANTOPRAZOLE SODIUM 40 MG: 40 TABLET, DELAYED RELEASE ORAL at 06:06

## 2025-08-19 RX ADMIN — PIPERACILLIN SODIUM AND TAZOBACTAM SODIUM 2.25 G: 2; .25 INJECTION, SOLUTION INTRAVENOUS at 15:04

## 2025-08-19 RX ADMIN — INSULIN LISPRO 2 UNITS: 100 INJECTION, SOLUTION INTRAVENOUS; SUBCUTANEOUS at 08:25

## 2025-08-19 RX ADMIN — LEVOTHYROXINE SODIUM 25 MCG: 0.03 TABLET ORAL at 06:06

## 2025-08-19 RX ADMIN — Medication 10 ML: at 09:59

## 2025-08-19 RX ADMIN — PIPERACILLIN SODIUM AND TAZOBACTAM SODIUM 2.25 G: 2; .25 INJECTION, SOLUTION INTRAVENOUS at 20:20

## 2025-08-19 RX ADMIN — DEXTROSE AND SODIUM CHLORIDE 50 ML/HR: 5; .45 INJECTION, SOLUTION INTRAVENOUS at 22:08

## 2025-08-19 RX ADMIN — INSULIN LISPRO 2 UNITS: 100 INJECTION, SOLUTION INTRAVENOUS; SUBCUTANEOUS at 16:36

## 2025-08-19 RX ADMIN — FAMOTIDINE 20 MG: 20 TABLET, FILM COATED ORAL at 08:25

## 2025-08-19 RX ADMIN — DEXTROSE AND SODIUM CHLORIDE 50 ML/HR: 5; .45 INJECTION, SOLUTION INTRAVENOUS at 16:37

## 2025-08-19 RX ADMIN — PIPERACILLIN SODIUM AND TAZOBACTAM SODIUM 2.25 G: 2; .25 INJECTION, SOLUTION INTRAVENOUS at 03:03

## 2025-08-19 RX ADMIN — ASPIRIN 81 MG: 81 TABLET, DELAYED RELEASE ORAL at 08:25

## 2025-08-19 RX ADMIN — DEXTROSE AND SODIUM CHLORIDE 75 ML/HR: 5; .45 INJECTION, SOLUTION INTRAVENOUS at 06:36

## 2025-08-19 RX ADMIN — PIPERACILLIN SODIUM AND TAZOBACTAM SODIUM 2.25 G: 2; .25 INJECTION, SOLUTION INTRAVENOUS at 09:59

## 2025-08-19 RX ADMIN — FAMOTIDINE 20 MG: 20 TABLET, FILM COATED ORAL at 20:20

## 2025-08-19 RX ADMIN — Medication 10 ML: at 03:04

## 2025-08-19 RX ADMIN — ENOXAPARIN SODIUM 30 MG: 30 INJECTION SUBCUTANEOUS at 10:03

## 2025-08-19 RX ADMIN — Medication 10 ML: at 20:21

## 2025-08-19 ASSESSMENT — PAIN - FUNCTIONAL ASSESSMENT
PAIN_FUNCTIONAL_ASSESSMENT: 0-10
PAIN_FUNCTIONAL_ASSESSMENT: 0-10

## 2025-08-19 ASSESSMENT — COGNITIVE AND FUNCTIONAL STATUS - GENERAL
WALKING IN HOSPITAL ROOM: A LITTLE
WALKING IN HOSPITAL ROOM: A LITTLE
STANDING UP FROM CHAIR USING ARMS: A LITTLE
HELP NEEDED FOR BATHING: A LOT
DRESSING REGULAR UPPER BODY CLOTHING: A LITTLE
DAILY ACTIVITIY SCORE: 20
MOBILITY SCORE: 20
MOBILITY SCORE: 20
MOVING TO AND FROM BED TO CHAIR: A LITTLE
PERSONAL GROOMING: A LITTLE
DRESSING REGULAR LOWER BODY CLOTHING: A LITTLE
TOILETING: A LITTLE
CLIMB 3 TO 5 STEPS WITH RAILING: A LITTLE
DAILY ACTIVITIY SCORE: 23
TOILETING: A LITTLE
DRESSING REGULAR UPPER BODY CLOTHING: A LITTLE
TOILETING: TOTAL
HELP NEEDED FOR BATHING: A LITTLE
MOVING TO AND FROM BED TO CHAIR: A LITTLE
DAILY ACTIVITIY SCORE: 15
CLIMB 3 TO 5 STEPS WITH RAILING: A LITTLE
STANDING UP FROM CHAIR USING ARMS: A LITTLE
DRESSING REGULAR LOWER BODY CLOTHING: A LOT

## 2025-08-19 ASSESSMENT — ACTIVITIES OF DAILY LIVING (ADL)
BATHING_ASSISTANCE: MODERATE
ADL_ASSISTANCE: INDEPENDENT

## 2025-08-19 ASSESSMENT — PAIN SCALES - GENERAL
PAINLEVEL_OUTOF10: 0 - NO PAIN

## 2025-08-20 ENCOUNTER — APPOINTMENT (OUTPATIENT)
Dept: RADIOLOGY | Facility: HOSPITAL | Age: OVER 89
DRG: 393 | End: 2025-08-20
Payer: MEDICARE

## 2025-08-20 VITALS
WEIGHT: 129.19 LBS | DIASTOLIC BLOOD PRESSURE: 54 MMHG | HEIGHT: 66 IN | SYSTOLIC BLOOD PRESSURE: 121 MMHG | TEMPERATURE: 97.7 F | BODY MASS INDEX: 20.76 KG/M2 | OXYGEN SATURATION: 92 % | HEART RATE: 61 BPM | RESPIRATION RATE: 22 BRPM

## 2025-08-20 LAB
ALBUMIN SERPL BCP-MCNC: 2.4 G/DL (ref 3.4–5)
ALBUMIN SERPL BCP-MCNC: 2.4 G/DL (ref 3.4–5)
ALP SERPL-CCNC: 50 U/L (ref 33–136)
ALP SERPL-CCNC: 54 U/L (ref 33–136)
ALT SERPL W P-5'-P-CCNC: 8 U/L (ref 10–52)
ALT SERPL W P-5'-P-CCNC: 9 U/L (ref 10–52)
ANION GAP SERPL CALC-SCNC: 10 MMOL/L (ref 10–20)
ANION GAP SERPL CALC-SCNC: 9 MMOL/L (ref 10–20)
AST SERPL W P-5'-P-CCNC: 12 U/L (ref 9–39)
AST SERPL W P-5'-P-CCNC: 15 U/L (ref 9–39)
BASOPHILS # BLD AUTO: 0.02 X10*3/UL (ref 0–0.1)
BASOPHILS NFR BLD AUTO: 0.4 %
BILIRUB SERPL-MCNC: 0.6 MG/DL (ref 0–1.2)
BILIRUB SERPL-MCNC: 0.7 MG/DL (ref 0–1.2)
BUN SERPL-MCNC: 28 MG/DL (ref 6–23)
BUN SERPL-MCNC: 31 MG/DL (ref 6–23)
CALCIUM SERPL-MCNC: 8.2 MG/DL (ref 8.6–10.3)
CALCIUM SERPL-MCNC: 8.3 MG/DL (ref 8.6–10.3)
CHLORIDE SERPL-SCNC: 98 MMOL/L (ref 98–107)
CHLORIDE SERPL-SCNC: 98 MMOL/L (ref 98–107)
CO2 SERPL-SCNC: 30 MMOL/L (ref 21–32)
CO2 SERPL-SCNC: 31 MMOL/L (ref 21–32)
CREAT SERPL-MCNC: 1.62 MG/DL (ref 0.5–1.3)
CREAT SERPL-MCNC: 1.65 MG/DL (ref 0.5–1.3)
EGFRCR SERPLBLD CKD-EPI 2021: 39 ML/MIN/1.73M*2
EGFRCR SERPLBLD CKD-EPI 2021: 40 ML/MIN/1.73M*2
EOSINOPHIL # BLD AUTO: 0.16 X10*3/UL (ref 0–0.4)
EOSINOPHIL NFR BLD AUTO: 3.4 %
ERYTHROCYTE [DISTWIDTH] IN BLOOD BY AUTOMATED COUNT: 15.1 % (ref 11.5–14.5)
GLUCOSE BLD MANUAL STRIP-MCNC: 103 MG/DL (ref 74–99)
GLUCOSE BLD MANUAL STRIP-MCNC: 186 MG/DL (ref 74–99)
GLUCOSE BLD MANUAL STRIP-MCNC: 69 MG/DL (ref 74–99)
GLUCOSE SERPL-MCNC: 132 MG/DL (ref 74–99)
GLUCOSE SERPL-MCNC: 68 MG/DL (ref 74–99)
HCT VFR BLD AUTO: 29 % (ref 41–52)
HGB BLD-MCNC: 10 G/DL (ref 13.5–17.5)
IMM GRANULOCYTES # BLD AUTO: 0.02 X10*3/UL (ref 0–0.5)
IMM GRANULOCYTES NFR BLD AUTO: 0.4 % (ref 0–0.9)
LACTATE SERPL-SCNC: 1.8 MMOL/L (ref 0.4–2)
LYMPHOCYTES # BLD AUTO: 1.15 X10*3/UL (ref 0.8–3)
LYMPHOCYTES NFR BLD AUTO: 24.2 %
MAGNESIUM SERPL-MCNC: 1.9 MG/DL (ref 1.6–2.4)
MCH RBC QN AUTO: 35.1 PG (ref 26–34)
MCHC RBC AUTO-ENTMCNC: 34.5 G/DL (ref 32–36)
MCV RBC AUTO: 102 FL (ref 80–100)
MONOCYTES # BLD AUTO: 0.34 X10*3/UL (ref 0.05–0.8)
MONOCYTES NFR BLD AUTO: 7.1 %
NEUTROPHILS # BLD AUTO: 3.07 X10*3/UL (ref 1.6–5.5)
NEUTROPHILS NFR BLD AUTO: 64.5 %
NRBC BLD-RTO: 0 /100 WBCS (ref 0–0)
PLATELET # BLD AUTO: 120 X10*3/UL (ref 150–450)
POTASSIUM SERPL-SCNC: 2.9 MMOL/L (ref 3.5–5.3)
POTASSIUM SERPL-SCNC: 3.2 MMOL/L (ref 3.5–5.3)
PROT SERPL-MCNC: 6.3 G/DL (ref 6.4–8.2)
PROT SERPL-MCNC: 6.5 G/DL (ref 6.4–8.2)
RBC # BLD AUTO: 2.85 X10*6/UL (ref 4.5–5.9)
SODIUM SERPL-SCNC: 135 MMOL/L (ref 136–145)
SODIUM SERPL-SCNC: 135 MMOL/L (ref 136–145)
STAPHYLOCOCCUS SPEC CULT: ABNORMAL
WBC # BLD AUTO: 4.8 X10*3/UL (ref 4.4–11.3)

## 2025-08-20 PROCEDURE — 74176 CT ABD & PELVIS W/O CONTRAST: CPT

## 2025-08-20 PROCEDURE — 83735 ASSAY OF MAGNESIUM: CPT | Performed by: STUDENT IN AN ORGANIZED HEALTH CARE EDUCATION/TRAINING PROGRAM

## 2025-08-20 PROCEDURE — 36415 COLL VENOUS BLD VENIPUNCTURE: CPT | Performed by: STUDENT IN AN ORGANIZED HEALTH CARE EDUCATION/TRAINING PROGRAM

## 2025-08-20 PROCEDURE — 85025 COMPLETE CBC W/AUTO DIFF WBC: CPT | Performed by: STUDENT IN AN ORGANIZED HEALTH CARE EDUCATION/TRAINING PROGRAM

## 2025-08-20 PROCEDURE — 97530 THERAPEUTIC ACTIVITIES: CPT | Mod: GO,CO

## 2025-08-20 PROCEDURE — 97161 PT EVAL LOW COMPLEX 20 MIN: CPT | Mod: GP

## 2025-08-20 PROCEDURE — 74176 CT ABD & PELVIS W/O CONTRAST: CPT | Performed by: STUDENT IN AN ORGANIZED HEALTH CARE EDUCATION/TRAINING PROGRAM

## 2025-08-20 PROCEDURE — 2500000001 HC RX 250 WO HCPCS SELF ADMINISTERED DRUGS (ALT 637 FOR MEDICARE OP): Performed by: HOSPITALIST

## 2025-08-20 PROCEDURE — 80053 COMPREHEN METABOLIC PANEL: CPT | Performed by: STUDENT IN AN ORGANIZED HEALTH CARE EDUCATION/TRAINING PROGRAM

## 2025-08-20 PROCEDURE — 2500000002 HC RX 250 W HCPCS SELF ADMINISTERED DRUGS (ALT 637 FOR MEDICARE OP, ALT 636 FOR OP/ED): Performed by: STUDENT IN AN ORGANIZED HEALTH CARE EDUCATION/TRAINING PROGRAM

## 2025-08-20 PROCEDURE — 2500000004 HC RX 250 GENERAL PHARMACY W/ HCPCS (ALT 636 FOR OP/ED): Performed by: HOSPITALIST

## 2025-08-20 PROCEDURE — 83605 ASSAY OF LACTIC ACID: CPT | Performed by: STUDENT IN AN ORGANIZED HEALTH CARE EDUCATION/TRAINING PROGRAM

## 2025-08-20 PROCEDURE — 97535 SELF CARE MNGMENT TRAINING: CPT | Mod: GO,CO

## 2025-08-20 PROCEDURE — 2500000004 HC RX 250 GENERAL PHARMACY W/ HCPCS (ALT 636 FOR OP/ED): Performed by: STUDENT IN AN ORGANIZED HEALTH CARE EDUCATION/TRAINING PROGRAM

## 2025-08-20 PROCEDURE — 99239 HOSP IP/OBS DSCHRG MGMT >30: CPT | Performed by: STUDENT IN AN ORGANIZED HEALTH CARE EDUCATION/TRAINING PROGRAM

## 2025-08-20 PROCEDURE — 82947 ASSAY GLUCOSE BLOOD QUANT: CPT

## 2025-08-20 PROCEDURE — 2500000002 HC RX 250 W HCPCS SELF ADMINISTERED DRUGS (ALT 637 FOR MEDICARE OP, ALT 636 FOR OP/ED): Performed by: HOSPITALIST

## 2025-08-20 PROCEDURE — 99232 SBSQ HOSP IP/OBS MODERATE 35: CPT | Performed by: SURGERY

## 2025-08-20 RX ORDER — AMOXICILLIN AND CLAVULANATE POTASSIUM 875; 125 MG/1; MG/1
1 TABLET, FILM COATED ORAL 2 TIMES DAILY
Qty: 14 TABLET | Refills: 0 | Status: SHIPPED | OUTPATIENT
Start: 2025-08-20 | End: 2025-08-27

## 2025-08-20 RX ORDER — FUROSEMIDE 10 MG/ML
40 INJECTION INTRAMUSCULAR; INTRAVENOUS ONCE
Status: COMPLETED | OUTPATIENT
Start: 2025-08-20 | End: 2025-08-20

## 2025-08-20 RX ORDER — HEPARIN 100 UNIT/ML
5 SYRINGE INTRAVENOUS ONCE
Status: COMPLETED | OUTPATIENT
Start: 2025-08-20 | End: 2025-08-20

## 2025-08-20 RX ORDER — POTASSIUM CHLORIDE 20 MEQ/1
40 TABLET, EXTENDED RELEASE ORAL ONCE
Status: COMPLETED | OUTPATIENT
Start: 2025-08-20 | End: 2025-08-20

## 2025-08-20 RX ADMIN — INSULIN LISPRO 2 UNITS: 100 INJECTION, SOLUTION INTRAVENOUS; SUBCUTANEOUS at 12:33

## 2025-08-20 RX ADMIN — PANTOPRAZOLE SODIUM 40 MG: 40 TABLET, DELAYED RELEASE ORAL at 06:01

## 2025-08-20 RX ADMIN — ASPIRIN 81 MG: 81 TABLET, DELAYED RELEASE ORAL at 08:08

## 2025-08-20 RX ADMIN — POTASSIUM CHLORIDE 40 MEQ: 1500 TABLET, EXTENDED RELEASE ORAL at 09:53

## 2025-08-20 RX ADMIN — LEVOTHYROXINE SODIUM 25 MCG: 0.03 TABLET ORAL at 06:01

## 2025-08-20 RX ADMIN — FAMOTIDINE 20 MG: 20 TABLET, FILM COATED ORAL at 08:08

## 2025-08-20 RX ADMIN — FUROSEMIDE 40 MG: 10 INJECTION, SOLUTION INTRAMUSCULAR; INTRAVENOUS at 16:02

## 2025-08-20 RX ADMIN — Medication 10 ML: at 08:14

## 2025-08-20 RX ADMIN — PIPERACILLIN SODIUM AND TAZOBACTAM SODIUM 2.25 G: 2; .25 INJECTION, SOLUTION INTRAVENOUS at 08:07

## 2025-08-20 RX ADMIN — ENOXAPARIN SODIUM 30 MG: 30 INJECTION SUBCUTANEOUS at 09:49

## 2025-08-20 RX ADMIN — PIPERACILLIN SODIUM AND TAZOBACTAM SODIUM 2.25 G: 2; .25 INJECTION, SOLUTION INTRAVENOUS at 03:02

## 2025-08-20 RX ADMIN — POTASSIUM CHLORIDE 40 MEQ: 1500 TABLET, EXTENDED RELEASE ORAL at 17:08

## 2025-08-20 RX ADMIN — HEPARIN 500 UNITS: 100 SYRINGE at 16:58

## 2025-08-20 ASSESSMENT — COGNITIVE AND FUNCTIONAL STATUS - GENERAL
WALKING IN HOSPITAL ROOM: A LITTLE
DRESSING REGULAR UPPER BODY CLOTHING: A LITTLE
WALKING IN HOSPITAL ROOM: A LITTLE
STANDING UP FROM CHAIR USING ARMS: A LITTLE
TURNING FROM BACK TO SIDE WHILE IN FLAT BAD: A LITTLE
TOILETING: A LOT
HELP NEEDED FOR BATHING: A LOT
CLIMB 3 TO 5 STEPS WITH RAILING: A LOT
MOVING TO AND FROM BED TO CHAIR: A LITTLE
PERSONAL GROOMING: A LITTLE
DRESSING REGULAR LOWER BODY CLOTHING: A LOT
CLIMB 3 TO 5 STEPS WITH RAILING: A LITTLE
MOVING FROM LYING ON BACK TO SITTING ON SIDE OF FLAT BED WITH BEDRAILS: A LITTLE
STANDING UP FROM CHAIR USING ARMS: A LITTLE
MOBILITY SCORE: 17
DAILY ACTIVITIY SCORE: 16

## 2025-08-20 ASSESSMENT — ACTIVITIES OF DAILY LIVING (ADL): HOME_MANAGEMENT_TIME_ENTRY: 14

## 2025-08-20 ASSESSMENT — PAIN SCALES - GENERAL
PAINLEVEL_OUTOF10: 0 - NO PAIN

## 2025-08-20 ASSESSMENT — PAIN - FUNCTIONAL ASSESSMENT
PAIN_FUNCTIONAL_ASSESSMENT: 0-10
PAIN_FUNCTIONAL_ASSESSMENT: 0-10

## 2025-08-21 ENCOUNTER — HOME HEALTH ADMISSION (OUTPATIENT)
Dept: HOME HEALTH SERVICES | Facility: HOME HEALTH | Age: OVER 89
End: 2025-08-21
Payer: MEDICARE

## 2025-08-21 ENCOUNTER — TELEPHONE (OUTPATIENT)
Dept: PRIMARY CARE | Facility: CLINIC | Age: OVER 89
End: 2025-08-21
Payer: MEDICARE

## 2025-08-21 ENCOUNTER — DOCUMENTATION (OUTPATIENT)
Dept: HOME HEALTH SERVICES | Facility: HOME HEALTH | Age: OVER 89
End: 2025-08-21
Payer: MEDICARE

## 2025-08-21 ENCOUNTER — DOCUMENTATION (OUTPATIENT)
Dept: CARE COORDINATION | Facility: CLINIC | Age: OVER 89
End: 2025-08-21
Payer: MEDICARE

## 2025-08-22 ENCOUNTER — TELEPHONE (OUTPATIENT)
Dept: PRIMARY CARE | Facility: CLINIC | Age: OVER 89
End: 2025-08-22
Payer: MEDICARE

## 2025-08-22 DIAGNOSIS — N40.1 BENIGN PROSTATIC HYPERPLASIA WITH LOWER URINARY TRACT SYMPTOMS, SYMPTOM DETAILS UNSPECIFIED: Primary | ICD-10-CM

## 2025-08-22 RX ORDER — TAMSULOSIN HYDROCHLORIDE 0.4 MG/1
0.4 CAPSULE ORAL DAILY
Qty: 30 CAPSULE | Refills: 3 | Status: CANCELLED | OUTPATIENT
Start: 2025-08-22 | End: 2026-08-22

## 2025-08-22 RX ORDER — TAMSULOSIN HYDROCHLORIDE 0.4 MG/1
0.4 CAPSULE ORAL DAILY
Qty: 30 CAPSULE | Refills: 11 | Status: SHIPPED | OUTPATIENT
Start: 2025-08-22 | End: 2026-08-22

## 2025-08-23 LAB — BACTERIA BLD CULT: NORMAL

## 2025-08-25 ENCOUNTER — APPOINTMENT (OUTPATIENT)
Dept: PRIMARY CARE | Facility: CLINIC | Age: OVER 89
End: 2025-08-25
Payer: MEDICARE

## 2025-08-25 VITALS
SYSTOLIC BLOOD PRESSURE: 118 MMHG | OXYGEN SATURATION: 97 % | DIASTOLIC BLOOD PRESSURE: 76 MMHG | TEMPERATURE: 97.6 F | HEART RATE: 84 BPM

## 2025-08-25 DIAGNOSIS — Z78.9 TRANSITION OF CARE: ICD-10-CM

## 2025-08-25 DIAGNOSIS — N32.0 BLADDER OUTLET OBSTRUCTION: ICD-10-CM

## 2025-08-25 DIAGNOSIS — N40.1 BENIGN PROSTATIC HYPERPLASIA WITH URINARY HESITANCY: ICD-10-CM

## 2025-08-25 DIAGNOSIS — I50.9 CONGESTIVE HEART FAILURE, UNSPECIFIED HF CHRONICITY, UNSPECIFIED HEART FAILURE TYPE: ICD-10-CM

## 2025-08-25 DIAGNOSIS — E78.5 DYSLIPIDEMIA: ICD-10-CM

## 2025-08-25 DIAGNOSIS — J90 BILATERAL PLEURAL EFFUSION: ICD-10-CM

## 2025-08-25 DIAGNOSIS — R39.11 BENIGN PROSTATIC HYPERPLASIA WITH URINARY HESITANCY: ICD-10-CM

## 2025-08-25 DIAGNOSIS — K56.609 SMALL BOWEL OBSTRUCTION (MULTI): Primary | ICD-10-CM

## 2025-08-25 DIAGNOSIS — K63.89 PNEUMATOSIS INTESTINALIS: ICD-10-CM

## 2025-08-25 DIAGNOSIS — B37.0 ORAL THRUSH: ICD-10-CM

## 2025-08-25 DIAGNOSIS — E11.9 DIABETES MELLITUS TYPE 2 WITHOUT RETINOPATHY (MULTI): ICD-10-CM

## 2025-08-25 DIAGNOSIS — N63.21 MASS OF UPPER OUTER QUADRANT OF LEFT BREAST: ICD-10-CM

## 2025-08-25 DIAGNOSIS — I25.10 CORONARY ARTERY ARTERIOSCLEROSIS: ICD-10-CM

## 2025-08-25 DIAGNOSIS — J96.01 ACUTE RESPIRATORY FAILURE WITH HYPOXIA: ICD-10-CM

## 2025-08-25 PROCEDURE — 1111F DSCHRG MED/CURRENT MED MERGE: CPT | Performed by: INTERNAL MEDICINE

## 2025-08-25 PROCEDURE — 1160F RVW MEDS BY RX/DR IN RCRD: CPT | Performed by: INTERNAL MEDICINE

## 2025-08-25 PROCEDURE — 1159F MED LIST DOCD IN RCRD: CPT | Performed by: INTERNAL MEDICINE

## 2025-08-25 PROCEDURE — 99496 TRANSJ CARE MGMT HIGH F2F 7D: CPT | Performed by: INTERNAL MEDICINE

## 2025-08-25 PROCEDURE — 3078F DIAST BP <80 MM HG: CPT | Performed by: INTERNAL MEDICINE

## 2025-08-25 PROCEDURE — 3074F SYST BP LT 130 MM HG: CPT | Performed by: INTERNAL MEDICINE

## 2025-08-25 RX ORDER — NYSTATIN 100000 [USP'U]/ML
5 SUSPENSION ORAL 4 TIMES DAILY
Qty: 280 ML | Refills: 0 | Status: SHIPPED | OUTPATIENT
Start: 2025-08-25 | End: 2025-09-08

## 2025-08-25 ASSESSMENT — ENCOUNTER SYMPTOMS
DIFFICULTY URINATING: 0
SORE THROAT: 0
FATIGUE: 1
WHEEZING: 0
ARTHRALGIAS: 0
SINUS PAIN: 0
FEVER: 0
PALPITATIONS: 0
DIARRHEA: 0
BRUISES/BLEEDS EASILY: 0
UNEXPECTED WEIGHT CHANGE: 0
COUGH: 0
ABDOMINAL PAIN: 0
BLOOD IN STOOL: 0
HEADACHES: 0
DIZZINESS: 0

## 2025-08-26 ENCOUNTER — HOME CARE VISIT (OUTPATIENT)
Dept: HOME HEALTH SERVICES | Facility: HOME HEALTH | Age: OVER 89
End: 2025-08-26
Payer: MEDICARE

## 2025-08-26 PROCEDURE — 169592 NO-PAY CLAIM PROCEDURE

## 2025-08-26 PROCEDURE — G0151 HHCP-SERV OF PT,EA 15 MIN: HCPCS | Mod: HHH

## 2025-08-26 ASSESSMENT — ACTIVITIES OF DAILY LIVING (ADL)
OASIS_M1830: 03
ENTERING_EXITING_HOME: MINIMUM ASSIST

## 2025-08-26 ASSESSMENT — ENCOUNTER SYMPTOMS
AGGRESSION WITHIN DEFINED LIMITS: 1
ANGER WITHIN DEFINED LIMITS: 1
PERSON REPORTING PAIN: PATIENT
DENIES PAIN: 1
SLEEP QUALITY: ADEQUATE

## 2025-08-27 ENCOUNTER — HOME CARE VISIT (OUTPATIENT)
Dept: HOME HEALTH SERVICES | Facility: HOME HEALTH | Age: OVER 89
End: 2025-08-27
Payer: MEDICARE

## 2025-08-27 LAB
APPEARANCE UR: CLEAR
BACTERIA #/AREA URNS HPF: ABNORMAL /HPF
BACTERIA UR CULT: ABNORMAL
BACTERIA UR CULT: ABNORMAL
BILIRUB UR QL STRIP: NEGATIVE
COLOR UR: YELLOW
GLUCOSE UR QL STRIP: NEGATIVE
HGB UR QL STRIP: NEGATIVE
HYALINE CASTS #/AREA URNS LPF: ABNORMAL /LPF
KETONES UR QL STRIP: NEGATIVE
LEUKOCYTE ESTERASE UR QL STRIP: ABNORMAL
NITRITE UR QL STRIP: NEGATIVE
PH UR STRIP: 5.5 [PH] (ref 5–8)
PROT UR QL STRIP: ABNORMAL
Q ONSET: 217 MS
QRS COUNT: 10 BEATS
QRS DURATION: 86 MS
QT INTERVAL: 436 MS
QTC CALCULATION(BAZETT): 436 MS
QTC FREDERICIA: 436 MS
R AXIS: -36 DEGREES
RBC #/AREA URNS HPF: ABNORMAL /HPF
SERVICE CMNT-IMP: ABNORMAL
SP GR UR STRIP: 1.01 (ref 1–1.03)
SQUAMOUS #/AREA URNS HPF: ABNORMAL /HPF
T AXIS: 215 DEGREES
T OFFSET: 435 MS
VENTRICULAR RATE: 60 BPM
WBC #/AREA URNS HPF: ABNORMAL /HPF

## 2025-08-28 ENCOUNTER — HOME CARE VISIT (OUTPATIENT)
Dept: HOME HEALTH SERVICES | Facility: HOME HEALTH | Age: OVER 89
End: 2025-08-28
Payer: MEDICARE

## 2025-08-28 VITALS
DIASTOLIC BLOOD PRESSURE: 72 MMHG | TEMPERATURE: 98.2 F | OXYGEN SATURATION: 99 % | SYSTOLIC BLOOD PRESSURE: 130 MMHG | HEART RATE: 73 BPM

## 2025-08-28 DIAGNOSIS — I50.33 ACUTE ON CHRONIC DIASTOLIC CONGESTIVE HEART FAILURE: Primary | ICD-10-CM

## 2025-08-28 DIAGNOSIS — E11.9 CONTROLLED TYPE 2 DIABETES MELLITUS WITHOUT COMPLICATION, UNSPECIFIED WHETHER LONG TERM INSULIN USE: Chronic | ICD-10-CM

## 2025-08-28 PROCEDURE — G0157 HHC PT ASSISTANT EA 15: HCPCS | Mod: CQ,HHH

## 2025-08-28 ASSESSMENT — ENCOUNTER SYMPTOMS
DENIES PAIN: 1
PERSON REPORTING PAIN: PATIENT

## 2025-09-02 ENCOUNTER — HOME CARE VISIT (OUTPATIENT)
Dept: HOME HEALTH SERVICES | Facility: HOME HEALTH | Age: OVER 89
End: 2025-09-02
Payer: MEDICARE

## 2025-09-02 VITALS
TEMPERATURE: 98.3 F | DIASTOLIC BLOOD PRESSURE: 64 MMHG | OXYGEN SATURATION: 98 % | HEART RATE: 81 BPM | SYSTOLIC BLOOD PRESSURE: 104 MMHG

## 2025-09-02 PROCEDURE — G0157 HHC PT ASSISTANT EA 15: HCPCS | Mod: CQ,HHH

## 2025-09-02 ASSESSMENT — ENCOUNTER SYMPTOMS
PERSON REPORTING PAIN: PATIENT
DENIES PAIN: 1

## 2025-09-04 ENCOUNTER — HOME CARE VISIT (OUTPATIENT)
Dept: HOME HEALTH SERVICES | Facility: HOME HEALTH | Age: OVER 89
End: 2025-09-04
Payer: MEDICARE

## 2025-09-05 ENCOUNTER — APPOINTMENT (OUTPATIENT)
Dept: SURGERY | Facility: CLINIC | Age: OVER 89
End: 2025-09-05
Payer: MEDICARE

## 2025-09-05 PROBLEM — N63.21 MASS OF UPPER OUTER QUADRANT OF LEFT BREAST: Status: ACTIVE | Noted: 2025-09-05

## 2025-09-05 PROBLEM — R22.2 MASS OF CHEST WALL, LEFT: Status: ACTIVE | Noted: 2025-09-05

## 2025-09-05 ASSESSMENT — ENCOUNTER SYMPTOMS: WEAKNESS: 1
